# Patient Record
Sex: MALE | Race: WHITE | NOT HISPANIC OR LATINO
[De-identification: names, ages, dates, MRNs, and addresses within clinical notes are randomized per-mention and may not be internally consistent; named-entity substitution may affect disease eponyms.]

---

## 2017-04-03 ENCOUNTER — APPOINTMENT (OUTPATIENT)
Dept: UROLOGY | Facility: CLINIC | Age: 77
End: 2017-04-03

## 2017-04-03 ENCOUNTER — OUTPATIENT (OUTPATIENT)
Dept: OUTPATIENT SERVICES | Facility: HOSPITAL | Age: 77
LOS: 1 days | End: 2017-04-03
Payer: MEDICARE

## 2017-04-03 DIAGNOSIS — R35.0 FREQUENCY OF MICTURITION: ICD-10-CM

## 2017-04-03 PROCEDURE — 76775 US EXAM ABDO BACK WALL LIM: CPT

## 2017-04-03 RX ORDER — POTASSIUM CITRATE 10 MEQ/1
10 MEQ TABLET, EXTENDED RELEASE ORAL TWICE DAILY
Qty: 180 | Refills: 3 | Status: ACTIVE | COMMUNITY
Start: 2017-04-03 | End: 1900-01-01

## 2017-04-10 DIAGNOSIS — N20.0 CALCULUS OF KIDNEY: ICD-10-CM

## 2017-05-18 ENCOUNTER — APPOINTMENT (OUTPATIENT)
Dept: RADIATION ONCOLOGY | Facility: CLINIC | Age: 77
End: 2017-05-18

## 2017-06-15 ENCOUNTER — APPOINTMENT (OUTPATIENT)
Dept: RADIATION ONCOLOGY | Facility: CLINIC | Age: 77
End: 2017-06-15

## 2017-06-22 ENCOUNTER — APPOINTMENT (OUTPATIENT)
Dept: RADIATION ONCOLOGY | Facility: CLINIC | Age: 77
End: 2017-06-22

## 2017-06-22 VITALS
DIASTOLIC BLOOD PRESSURE: 84 MMHG | WEIGHT: 233 LBS | OXYGEN SATURATION: 95 % | BODY MASS INDEX: 33.43 KG/M2 | HEART RATE: 70 BPM | RESPIRATION RATE: 18 BRPM | SYSTOLIC BLOOD PRESSURE: 131 MMHG

## 2017-09-05 ENCOUNTER — APPOINTMENT (OUTPATIENT)
Dept: ORTHOPEDIC SURGERY | Facility: CLINIC | Age: 77
End: 2017-09-05

## 2018-03-18 ENCOUNTER — EMERGENCY (EMERGENCY)
Facility: HOSPITAL | Age: 78
LOS: 1 days | Discharge: HOME | End: 2018-03-18
Attending: EMERGENCY MEDICINE | Admitting: EMERGENCY MEDICINE
Payer: MEDICARE

## 2018-03-18 VITALS
DIASTOLIC BLOOD PRESSURE: 83 MMHG | TEMPERATURE: 98 F | OXYGEN SATURATION: 96 % | RESPIRATION RATE: 16 BRPM | HEART RATE: 84 BPM | SYSTOLIC BLOOD PRESSURE: 127 MMHG

## 2018-03-18 VITALS
TEMPERATURE: 99 F | DIASTOLIC BLOOD PRESSURE: 87 MMHG | RESPIRATION RATE: 18 BRPM | SYSTOLIC BLOOD PRESSURE: 133 MMHG | HEART RATE: 87 BPM | OXYGEN SATURATION: 96 %

## 2018-03-18 LAB
ALBUMIN SERPL ELPH-MCNC: 4.1 G/DL — SIGNIFICANT CHANGE UP (ref 3.3–5)
ALP SERPL-CCNC: 106 U/L — SIGNIFICANT CHANGE UP (ref 40–120)
ALT FLD-CCNC: <5 U/L RC — LOW (ref 10–45)
ANION GAP SERPL CALC-SCNC: 11 MMOL/L — SIGNIFICANT CHANGE UP (ref 5–17)
APPEARANCE UR: CLEAR — SIGNIFICANT CHANGE UP
APTT BLD: 29.7 SEC — SIGNIFICANT CHANGE UP (ref 27.5–37.4)
AST SERPL-CCNC: 29 U/L — SIGNIFICANT CHANGE UP (ref 10–40)
BACTERIA # UR AUTO: ABNORMAL /HPF
BASOPHILS # BLD AUTO: 0.1 K/UL — SIGNIFICANT CHANGE UP (ref 0–0.2)
BASOPHILS NFR BLD AUTO: 0.6 % — SIGNIFICANT CHANGE UP (ref 0–2)
BILIRUB SERPL-MCNC: 0.5 MG/DL — SIGNIFICANT CHANGE UP (ref 0.2–1.2)
BILIRUB UR-MCNC: NEGATIVE — SIGNIFICANT CHANGE UP
BUN SERPL-MCNC: 20 MG/DL — SIGNIFICANT CHANGE UP (ref 7–23)
CALCIUM SERPL-MCNC: 9.1 MG/DL — SIGNIFICANT CHANGE UP (ref 8.4–10.5)
CHLORIDE SERPL-SCNC: 105 MMOL/L — SIGNIFICANT CHANGE UP (ref 96–108)
CO2 SERPL-SCNC: 24 MMOL/L — SIGNIFICANT CHANGE UP (ref 22–31)
COLOR SPEC: YELLOW — SIGNIFICANT CHANGE UP
COMMENT - URINE: SIGNIFICANT CHANGE UP
CREAT SERPL-MCNC: 1.51 MG/DL — HIGH (ref 0.5–1.3)
DIFF PNL FLD: NEGATIVE — SIGNIFICANT CHANGE UP
EOSINOPHIL # BLD AUTO: 0.9 K/UL — HIGH (ref 0–0.5)
EOSINOPHIL NFR BLD AUTO: 7.8 % — HIGH (ref 0–6)
GAS PNL BLDV: SIGNIFICANT CHANGE UP
GLUCOSE SERPL-MCNC: 116 MG/DL — HIGH (ref 70–99)
GLUCOSE UR QL: NEGATIVE — SIGNIFICANT CHANGE UP
HCT VFR BLD CALC: 40.4 % — SIGNIFICANT CHANGE UP (ref 39–50)
HGB BLD-MCNC: 13.3 G/DL — SIGNIFICANT CHANGE UP (ref 13–17)
HYALINE CASTS # UR AUTO: ABNORMAL
INR BLD: 1.08 RATIO — SIGNIFICANT CHANGE UP (ref 0.88–1.16)
KETONES UR-MCNC: NEGATIVE — SIGNIFICANT CHANGE UP
LEUKOCYTE ESTERASE UR-ACNC: NEGATIVE — SIGNIFICANT CHANGE UP
LIDOCAIN IGE QN: 99 U/L — HIGH (ref 7–60)
LYMPHOCYTES # BLD AUTO: 2.8 K/UL — SIGNIFICANT CHANGE UP (ref 1–3.3)
LYMPHOCYTES # BLD AUTO: 24.1 % — SIGNIFICANT CHANGE UP (ref 13–44)
MAGNESIUM SERPL-MCNC: 2.1 MG/DL — SIGNIFICANT CHANGE UP (ref 1.6–2.6)
MCHC RBC-ENTMCNC: 30.9 PG — SIGNIFICANT CHANGE UP (ref 27–34)
MCHC RBC-ENTMCNC: 33 GM/DL — SIGNIFICANT CHANGE UP (ref 32–36)
MCV RBC AUTO: 93.7 FL — SIGNIFICANT CHANGE UP (ref 80–100)
MONOCYTES # BLD AUTO: 1 K/UL — HIGH (ref 0–0.9)
MONOCYTES NFR BLD AUTO: 8.9 % — SIGNIFICANT CHANGE UP (ref 2–14)
NEUTROPHILS # BLD AUTO: 6.9 K/UL — SIGNIFICANT CHANGE UP (ref 1.8–7.4)
NEUTROPHILS NFR BLD AUTO: 58.6 % — SIGNIFICANT CHANGE UP (ref 43–77)
NITRITE UR-MCNC: NEGATIVE — SIGNIFICANT CHANGE UP
PH UR: 6 — SIGNIFICANT CHANGE UP (ref 5–8)
PHOSPHATE SERPL-MCNC: 2.9 MG/DL — SIGNIFICANT CHANGE UP (ref 2.5–4.5)
PLATELET # BLD AUTO: 268 K/UL — SIGNIFICANT CHANGE UP (ref 150–400)
POTASSIUM SERPL-MCNC: 4.6 MMOL/L — SIGNIFICANT CHANGE UP (ref 3.5–5.3)
POTASSIUM SERPL-SCNC: 4.6 MMOL/L — SIGNIFICANT CHANGE UP (ref 3.5–5.3)
PROT SERPL-MCNC: 7.9 G/DL — SIGNIFICANT CHANGE UP (ref 6–8.3)
PROT UR-MCNC: NEGATIVE — SIGNIFICANT CHANGE UP
PROTHROM AB SERPL-ACNC: 11.8 SEC — SIGNIFICANT CHANGE UP (ref 9.8–12.7)
RBC # BLD: 4.31 M/UL — SIGNIFICANT CHANGE UP (ref 4.2–5.8)
RBC # FLD: 13.1 % — SIGNIFICANT CHANGE UP (ref 10.3–14.5)
RBC CASTS # UR COMP ASSIST: SIGNIFICANT CHANGE UP /HPF (ref 0–2)
SODIUM SERPL-SCNC: 140 MMOL/L — SIGNIFICANT CHANGE UP (ref 135–145)
SP GR SPEC: 1.02 — SIGNIFICANT CHANGE UP (ref 1.01–1.02)
UROBILINOGEN FLD QL: NEGATIVE — SIGNIFICANT CHANGE UP
WBC # BLD: 11.7 K/UL — HIGH (ref 3.8–10.5)
WBC # FLD AUTO: 11.7 K/UL — HIGH (ref 3.8–10.5)
WBC UR QL: SIGNIFICANT CHANGE UP /HPF (ref 0–5)

## 2018-03-18 PROCEDURE — 82947 ASSAY GLUCOSE BLOOD QUANT: CPT

## 2018-03-18 PROCEDURE — 85610 PROTHROMBIN TIME: CPT

## 2018-03-18 PROCEDURE — 84100 ASSAY OF PHOSPHORUS: CPT

## 2018-03-18 PROCEDURE — 99284 EMERGENCY DEPT VISIT MOD MDM: CPT | Mod: GC

## 2018-03-18 PROCEDURE — 74177 CT ABD & PELVIS W/CONTRAST: CPT | Mod: 26

## 2018-03-18 PROCEDURE — 83605 ASSAY OF LACTIC ACID: CPT

## 2018-03-18 PROCEDURE — 82435 ASSAY OF BLOOD CHLORIDE: CPT

## 2018-03-18 PROCEDURE — 82803 BLOOD GASES ANY COMBINATION: CPT

## 2018-03-18 PROCEDURE — 81001 URINALYSIS AUTO W/SCOPE: CPT

## 2018-03-18 PROCEDURE — 85014 HEMATOCRIT: CPT

## 2018-03-18 PROCEDURE — 85027 COMPLETE CBC AUTOMATED: CPT

## 2018-03-18 PROCEDURE — 84295 ASSAY OF SERUM SODIUM: CPT

## 2018-03-18 PROCEDURE — 80053 COMPREHEN METABOLIC PANEL: CPT

## 2018-03-18 PROCEDURE — 82330 ASSAY OF CALCIUM: CPT

## 2018-03-18 PROCEDURE — 85730 THROMBOPLASTIN TIME PARTIAL: CPT

## 2018-03-18 PROCEDURE — 36415 COLL VENOUS BLD VENIPUNCTURE: CPT

## 2018-03-18 PROCEDURE — 84132 ASSAY OF SERUM POTASSIUM: CPT

## 2018-03-18 PROCEDURE — 83735 ASSAY OF MAGNESIUM: CPT

## 2018-03-18 PROCEDURE — 99284 EMERGENCY DEPT VISIT MOD MDM: CPT | Mod: 25

## 2018-03-18 PROCEDURE — 83690 ASSAY OF LIPASE: CPT

## 2018-03-18 PROCEDURE — 74177 CT ABD & PELVIS W/CONTRAST: CPT

## 2018-03-18 RX ORDER — SODIUM CHLORIDE 9 MG/ML
1000 INJECTION INTRAMUSCULAR; INTRAVENOUS; SUBCUTANEOUS ONCE
Qty: 0 | Refills: 0 | Status: COMPLETED | OUTPATIENT
Start: 2018-03-18 | End: 2018-03-18

## 2018-03-18 RX ADMIN — SODIUM CHLORIDE 1000 MILLILITER(S): 9 INJECTION INTRAMUSCULAR; INTRAVENOUS; SUBCUTANEOUS at 12:08

## 2018-03-18 NOTE — ED PROVIDER NOTE - ATTENDING CONTRIBUTION TO CARE
attending Myriam: 77yM h/o Parkinson's disease, HTN, HLD p/w abdominal pain and diarrhea. As per pt's wife, pt with watery diarrhea yesterday, resolved. No BRBPR or melena, fevers, chills, sweats, nausea or vomiting. Today with episode of LLQ pain, severe x 1 hour but now improved. Longstanding history alternating constipation and diarrhea requiring pelvic floor rehabilitation. Prior C diff, last abx 12 months ago. On exam, well-appearing, NAD, VSS, abdomen soft/NT/ND. Will obtain labs, urinalysis, CT A/P, IVF and reassess.

## 2018-03-18 NOTE — ED ADULT NURSE NOTE - PMH
Depression    Gout    HLD (hyperlipidemia)    HTN (hypertension)    Parkinson's disease    Prostate cancer

## 2018-03-18 NOTE — ED PROVIDER NOTE - NS ED ROS FT
REVIEW OF SYSTEMS:    CONSTITUTIONAL: No weakness, fevers or chills  EYES/ENT: No visual changes;  No vertigo or throat pain   NECK: No pain or stiffness  RESPIRATORY: No cough, wheezing, hemoptysis; No shortness of breath  CARDIOVASCULAR: No chest pain or palpitations  GASTROINTESTINAL: Endorses abdominal pain. No nausea, vomiting, or hematemesis; Endorses diarrhea. No melena or hematochezia.  GENITOURINARY: No dysuria, frequency or hematuria  NEUROLOGICAL: No numbness or weakness  SKIN: No itching, burning, rashes, or lesions   All other review of systems is negative unless indicated above.

## 2018-03-18 NOTE — ED PROVIDER NOTE - PHYSICAL EXAMINATION
GENERAL: No acute distress, well-developed  HEAD:  Atraumatic, Normocephalic  ENT: EOMI, PERRLA, conjunctiva and sclera clear, Neck supple, No JVD, dry mucosa, no pharyngeal erythema, no tonsillar enlargement or exudate  CHEST/LUNG: Clear to auscultation bilaterally; No wheeze, equal breath sounds bilaterally   HEART: Regular rate and rhythm; No murmurs, rubs, or gallops  ABDOMEN: Soft, Nontender, Nondistended; Bowel sounds present, no organomegaly  EXTREMITIES:  No clubbing, cyanosis, or edema  PSYCH: Nl behavior, nl affect  NEUROLOGY: AAOx3, non-focal, cranial nerves intact  SKIN: Normal color, No rashes or lesions

## 2018-03-18 NOTE — ED PROVIDER NOTE - OBJECTIVE STATEMENT
76 y/o M hx Parkinson's disease, HTN, HLD presents with abdominal pain and diarrhea.     Per patient's wife, he had constant diarrhea for nearly four hours yesterday. Stool is described as loose and "clumpy." Denies BRBPR or melena. Denies fevers, chills, sweats, nausea or vomiting. This morning, he had severe sharp left lower abdominal pain lasting approx 45 minutes without radiation. Pain is presently nearly resolved. He does have a hx of alternating constipation and diarrhea requiring pelvic floor rehabilitation. Patient follows with [facility] and PMD is Dr. Piotr Mcdonnell. He has history of Cdiff infection, though wife is unsure when. Patient denies recent hospitalization, antibiotic use, or illness.

## 2018-03-18 NOTE — ED PROVIDER NOTE - MEDICAL DECISION MAKING DETAILS
78 y/o M Parkinson's Disease p/w LLQ abdominal pain and diarrhea. VSS. Appears clinically dehydrated. Abdominal exam without tenderness to palpation. IVF, CT A/P, CBC, CMP, re-evaluation.

## 2018-03-18 NOTE — ED ADULT NURSE NOTE - OBJECTIVE STATEMENT
77 year old male presents to the ED complaining of abdominal pain that started suddenly last night. As per patient he had 'constipation pain' yesterday that was followed by an episode of diarrhea. As per patient the pain was severe this morning and has been resolving since arrival in the ED. Pt has parkinson's and a history of prostate CA. Pt is A&O x 4, VSS, afebrile, ambulating independently. Pt denies fever, chills, NVD, SOB, or chest pain. IV placed, labs drawn, bed in low position, safety measures in place. Wife at bedside, pt comfortable in appearance, pt refusing to put on ER socks, requests to wear shoes when ambulating in the ED.

## 2018-03-18 NOTE — ED ADULT NURSE NOTE - DISCHARGE TEACHING
How Severe Are They?: moderate
Is This A New Presentation, Or A Follow-Up?: Discoloration
Pt discharged, VSS, afebrile, ambulating independently. Nurse clearly reviewed discharge instructions, followup care, and when to return to the ED - Pt verbalized understanding.

## 2018-04-02 ENCOUNTER — APPOINTMENT (OUTPATIENT)
Dept: UROLOGY | Facility: CLINIC | Age: 78
End: 2018-04-02

## 2018-05-08 ENCOUNTER — RX RENEWAL (OUTPATIENT)
Age: 78
End: 2018-05-08

## 2018-06-22 ENCOUNTER — APPOINTMENT (OUTPATIENT)
Dept: UROLOGY | Facility: CLINIC | Age: 78
End: 2018-06-22
Payer: MEDICARE

## 2018-06-22 DIAGNOSIS — N40.0 BENIGN PROSTATIC HYPERPLASIA WITHOUT LOWER URINARY TRACT SYMPMS: ICD-10-CM

## 2018-06-22 PROCEDURE — 99213 OFFICE O/P EST LOW 20 MIN: CPT

## 2018-06-25 LAB — PSA SERPL-MCNC: 0.09 NG/ML

## 2018-06-28 ENCOUNTER — APPOINTMENT (OUTPATIENT)
Dept: RADIATION ONCOLOGY | Facility: CLINIC | Age: 78
End: 2018-06-28
Payer: MEDICARE

## 2018-06-28 VITALS
OXYGEN SATURATION: 98 % | RESPIRATION RATE: 16 BRPM | DIASTOLIC BLOOD PRESSURE: 85 MMHG | HEART RATE: 103 BPM | SYSTOLIC BLOOD PRESSURE: 139 MMHG | TEMPERATURE: 97.9 F

## 2018-06-28 PROCEDURE — 99213 OFFICE O/P EST LOW 20 MIN: CPT

## 2018-08-06 ENCOUNTER — RX RENEWAL (OUTPATIENT)
Age: 78
End: 2018-08-06

## 2018-08-06 RX ORDER — ALLOPURINOL 300 MG/1
300 TABLET ORAL DAILY
Qty: 90 | Refills: 2 | Status: ACTIVE | COMMUNITY
Start: 2017-04-03 | End: 1900-01-01

## 2018-12-03 ENCOUNTER — APPOINTMENT (OUTPATIENT)
Dept: UROLOGY | Facility: CLINIC | Age: 78
End: 2018-12-03

## 2019-02-13 ENCOUNTER — OUTPATIENT (OUTPATIENT)
Dept: OUTPATIENT SERVICES | Facility: HOSPITAL | Age: 79
LOS: 1 days | End: 2019-02-13
Payer: MEDICARE

## 2019-02-13 ENCOUNTER — APPOINTMENT (OUTPATIENT)
Dept: UROLOGY | Facility: CLINIC | Age: 79
End: 2019-02-13
Payer: MEDICARE

## 2019-02-13 DIAGNOSIS — C61 MALIGNANT NEOPLASM OF PROSTATE: ICD-10-CM

## 2019-02-13 DIAGNOSIS — N20.0 CALCULUS OF KIDNEY: ICD-10-CM

## 2019-02-13 PROCEDURE — 99213 OFFICE O/P EST LOW 20 MIN: CPT | Mod: 25

## 2019-02-13 PROCEDURE — 76775 US EXAM ABDO BACK WALL LIM: CPT | Mod: 26

## 2019-02-13 PROCEDURE — 76775 US EXAM ABDO BACK WALL LIM: CPT

## 2019-02-13 NOTE — ASSESSMENT
[FreeTextEntry1] : LL shows decreased output still low, decreased Cit levels, consistently high Ox levels, and he is SS CaOx.\par Advised pt to once again increase fluid intake and to decrease Ox intake. \par Pt is on allopurinol.\par He is unable to swallow K Citrate. \par Otherwise, pt is doing well.\par Repeat LL in 5 months and RTO in 6 months.

## 2019-02-13 NOTE — ADDENDUM
[FreeTextEntry1] :  I, Cely Ledezma, acted solely as a scribe for Dr. Bhupinder Calle. The documentation recorded by the scribe accurately reflects the service I personally performed and the decision by me.\par

## 2019-02-13 NOTE — HISTORY OF PRESENT ILLNESS
[FreeTextEntry1] : 78 year old male presents for evaluation of test results.\par He has Parkinson's disease.\par This patient had radiation therapy for his prostate cancer.\par He's attended physiotherapy for pelvic floor, which his wife had said has resolved his incontinence and improved his bowel movements. \par Nocturia x0-1\par Last PSA was 0.09 in June 2018\par US today reviewed: Aside from simple renal cysts, pt has no stones, hydronephrosis or solid masses bilaterally. \par LL shows decreased output still low, decreased Cit levels, consistently high Ox levels, and he is SS CaOx.\par Advised pt to once again increase fluid intake and to decrease Ox intake. \par Pt is on allopurinol.\par He is unable to swallow K Citrate. \par Otherwise, pt is doing well.\par Repeat LL in 5 months and RTO in 6 months.

## 2019-02-15 DIAGNOSIS — C61 MALIGNANT NEOPLASM OF PROSTATE: ICD-10-CM

## 2019-02-15 DIAGNOSIS — N20.0 CALCULUS OF KIDNEY: ICD-10-CM

## 2019-02-25 ENCOUNTER — APPOINTMENT (OUTPATIENT)
Dept: NEUROLOGY | Facility: CLINIC | Age: 79
End: 2019-02-25

## 2019-03-11 ENCOUNTER — OUTPATIENT (OUTPATIENT)
Dept: OUTPATIENT SERVICES | Facility: HOSPITAL | Age: 79
LOS: 1 days | End: 2019-03-11
Payer: MEDICARE

## 2019-03-11 ENCOUNTER — OTHER (OUTPATIENT)
Age: 79
End: 2019-03-11

## 2019-03-11 ENCOUNTER — APPOINTMENT (OUTPATIENT)
Dept: UROLOGY | Facility: CLINIC | Age: 79
End: 2019-03-11
Payer: MEDICARE

## 2019-03-11 VITALS — SYSTOLIC BLOOD PRESSURE: 130 MMHG | DIASTOLIC BLOOD PRESSURE: 70 MMHG

## 2019-03-11 DIAGNOSIS — R31.0 GROSS HEMATURIA: ICD-10-CM

## 2019-03-11 DIAGNOSIS — R35.0 FREQUENCY OF MICTURITION: ICD-10-CM

## 2019-03-11 PROCEDURE — 51701 INSERT BLADDER CATHETER: CPT

## 2019-03-11 PROCEDURE — 99213 OFFICE O/P EST LOW 20 MIN: CPT | Mod: 25

## 2019-03-11 NOTE — HISTORY OF PRESENT ILLNESS
[FreeTextEntry1] : 78 year old male presents with acute urinary retention.\par Recently hospitalized for pneumonia and was sent for rehabilitation.\par In the Rehabilitation center, he was advised to drink a great deal of water and then developed urinary retention.\par An attempt was made to catheterize him by a nurse and this resulted in significant urethral bleeding and a doctor was called to try again.\par He eventually managed to introduce the catheter and since then, the pt has had gross hematuria.\par The catheter was removed 2 days ago and he went back into retention. \par I've arranged to insert a Arrieta catheter and plan to keep it in place until his urine is clear. \par At that stage, we should plan to remove the catheter at home and then bring hm in to check his residual urine.

## 2019-03-12 RX ORDER — AMOXICILLIN AND CLAVULANATE POTASSIUM 875; 125 MG/1; MG/1
875-125 TABLET, COATED ORAL
Qty: 14 | Refills: 0 | Status: ACTIVE | COMMUNITY
Start: 2019-03-12 | End: 1900-01-01

## 2019-03-12 RX ORDER — CIPROFLOXACIN HYDROCHLORIDE 500 MG/1
500 TABLET, FILM COATED ORAL TWICE DAILY
Qty: 10 | Refills: 0 | Status: ACTIVE | COMMUNITY
Start: 2019-03-12 | End: 1900-01-01

## 2019-03-13 ENCOUNTER — MESSAGE (OUTPATIENT)
Age: 79
End: 2019-03-13

## 2019-03-15 ENCOUNTER — INPATIENT (INPATIENT)
Facility: HOSPITAL | Age: 79
LOS: 10 days | Discharge: HOME | DRG: 698 | End: 2019-03-26
Attending: HOSPITALIST | Admitting: STUDENT IN AN ORGANIZED HEALTH CARE EDUCATION/TRAINING PROGRAM
Payer: MEDICARE

## 2019-03-15 VITALS
HEIGHT: 70 IN | WEIGHT: 214.95 LBS | HEART RATE: 113 BPM | OXYGEN SATURATION: 95 % | TEMPERATURE: 98 F | RESPIRATION RATE: 20 BRPM | DIASTOLIC BLOOD PRESSURE: 74 MMHG | SYSTOLIC BLOOD PRESSURE: 125 MMHG

## 2019-03-15 PROCEDURE — 99285 EMERGENCY DEPT VISIT HI MDM: CPT | Mod: 25,GC

## 2019-03-15 PROCEDURE — 51700 IRRIGATION OF BLADDER: CPT | Mod: GC

## 2019-03-16 DIAGNOSIS — N17.9 ACUTE KIDNEY FAILURE, UNSPECIFIED: ICD-10-CM

## 2019-03-16 DIAGNOSIS — Z29.9 ENCOUNTER FOR PROPHYLACTIC MEASURES, UNSPECIFIED: ICD-10-CM

## 2019-03-16 DIAGNOSIS — K59.00 CONSTIPATION, UNSPECIFIED: ICD-10-CM

## 2019-03-16 DIAGNOSIS — E78.5 HYPERLIPIDEMIA, UNSPECIFIED: ICD-10-CM

## 2019-03-16 DIAGNOSIS — N39.0 URINARY TRACT INFECTION, SITE NOT SPECIFIED: ICD-10-CM

## 2019-03-16 DIAGNOSIS — G20 PARKINSON'S DISEASE: ICD-10-CM

## 2019-03-16 DIAGNOSIS — R33.9 RETENTION OF URINE, UNSPECIFIED: ICD-10-CM

## 2019-03-16 LAB
ALBUMIN SERPL ELPH-MCNC: 3.9 G/DL — SIGNIFICANT CHANGE UP (ref 3.3–5)
ALP SERPL-CCNC: 100 U/L — SIGNIFICANT CHANGE UP (ref 40–120)
ALT FLD-CCNC: 6 U/L — LOW (ref 10–45)
ANION GAP SERPL CALC-SCNC: 14 MMOL/L — SIGNIFICANT CHANGE UP (ref 5–17)
ANION GAP SERPL CALC-SCNC: 16 MMOL/L — SIGNIFICANT CHANGE UP (ref 5–17)
APPEARANCE UR: ABNORMAL
AST SERPL-CCNC: 29 U/L — SIGNIFICANT CHANGE UP (ref 10–40)
BASOPHILS # BLD AUTO: 0.1 K/UL — SIGNIFICANT CHANGE UP (ref 0–0.2)
BASOPHILS NFR BLD AUTO: 0.5 % — SIGNIFICANT CHANGE UP (ref 0–2)
BILIRUB SERPL-MCNC: 0.2 MG/DL — SIGNIFICANT CHANGE UP (ref 0.2–1.2)
BILIRUB UR-MCNC: SIGNIFICANT CHANGE UP
BUN SERPL-MCNC: 38 MG/DL — HIGH (ref 7–23)
BUN SERPL-MCNC: 40 MG/DL — HIGH (ref 7–23)
CALCIUM SERPL-MCNC: 9.1 MG/DL — SIGNIFICANT CHANGE UP (ref 8.4–10.5)
CALCIUM SERPL-MCNC: 9.1 MG/DL — SIGNIFICANT CHANGE UP (ref 8.4–10.5)
CHLORIDE SERPL-SCNC: 103 MMOL/L — SIGNIFICANT CHANGE UP (ref 96–108)
CHLORIDE SERPL-SCNC: 104 MMOL/L — SIGNIFICANT CHANGE UP (ref 96–108)
CO2 SERPL-SCNC: 19 MMOL/L — LOW (ref 22–31)
CO2 SERPL-SCNC: 20 MMOL/L — LOW (ref 22–31)
COLOR SPEC: ABNORMAL
CREAT SERPL-MCNC: 2.88 MG/DL — HIGH (ref 0.5–1.3)
CREAT SERPL-MCNC: 3.04 MG/DL — HIGH (ref 0.5–1.3)
DIFF PNL FLD: SIGNIFICANT CHANGE UP
EOSINOPHIL # BLD AUTO: 0.4 K/UL — SIGNIFICANT CHANGE UP (ref 0–0.5)
EOSINOPHIL NFR BLD AUTO: 2.4 % — SIGNIFICANT CHANGE UP (ref 0–6)
GLUCOSE SERPL-MCNC: 110 MG/DL — HIGH (ref 70–99)
GLUCOSE SERPL-MCNC: 117 MG/DL — HIGH (ref 70–99)
GLUCOSE UR QL: SIGNIFICANT CHANGE UP
HCT VFR BLD CALC: 32.6 % — LOW (ref 39–50)
HGB BLD-MCNC: 10.9 G/DL — LOW (ref 13–17)
KETONES UR-MCNC: SIGNIFICANT CHANGE UP
LEUKOCYTE ESTERASE UR-ACNC: SIGNIFICANT CHANGE UP
LYMPHOCYTES # BLD AUTO: 1.9 K/UL — SIGNIFICANT CHANGE UP (ref 1–3.3)
LYMPHOCYTES # BLD AUTO: 12.3 % — LOW (ref 13–44)
MCHC RBC-ENTMCNC: 30.7 PG — SIGNIFICANT CHANGE UP (ref 27–34)
MCHC RBC-ENTMCNC: 33.3 GM/DL — SIGNIFICANT CHANGE UP (ref 32–36)
MCV RBC AUTO: 92.1 FL — SIGNIFICANT CHANGE UP (ref 80–100)
MONOCYTES # BLD AUTO: 0.8 K/UL — SIGNIFICANT CHANGE UP (ref 0–0.9)
MONOCYTES NFR BLD AUTO: 5.3 % — SIGNIFICANT CHANGE UP (ref 2–14)
NEUTROPHILS # BLD AUTO: 12.2 K/UL — HIGH (ref 1.8–7.4)
NEUTROPHILS NFR BLD AUTO: 79.5 % — HIGH (ref 43–77)
NITRITE UR-MCNC: SIGNIFICANT CHANGE UP
PH UR: SIGNIFICANT CHANGE UP (ref 5–8)
PLATELET # BLD AUTO: 387 K/UL — SIGNIFICANT CHANGE UP (ref 150–400)
POTASSIUM SERPL-MCNC: 4.8 MMOL/L — SIGNIFICANT CHANGE UP (ref 3.5–5.3)
POTASSIUM SERPL-MCNC: 4.9 MMOL/L — SIGNIFICANT CHANGE UP (ref 3.5–5.3)
POTASSIUM SERPL-SCNC: 4.8 MMOL/L — SIGNIFICANT CHANGE UP (ref 3.5–5.3)
POTASSIUM SERPL-SCNC: 4.9 MMOL/L — SIGNIFICANT CHANGE UP (ref 3.5–5.3)
PROT SERPL-MCNC: 7.3 G/DL — SIGNIFICANT CHANGE UP (ref 6–8.3)
PROT UR-MCNC: SIGNIFICANT CHANGE UP
RBC # BLD: 3.54 M/UL — LOW (ref 4.2–5.8)
RBC # FLD: 13.2 % — SIGNIFICANT CHANGE UP (ref 10.3–14.5)
RBC CASTS # UR COMP ASSIST: >50 /HPF — HIGH (ref 0–4)
SODIUM SERPL-SCNC: 138 MMOL/L — SIGNIFICANT CHANGE UP (ref 135–145)
SODIUM SERPL-SCNC: 138 MMOL/L — SIGNIFICANT CHANGE UP (ref 135–145)
SP GR SPEC: 1.03 — HIGH (ref 1.01–1.02)
UROBILINOGEN FLD QL: SIGNIFICANT CHANGE UP
WBC # BLD: 15.4 K/UL — HIGH (ref 3.8–10.5)
WBC # FLD AUTO: 15.4 K/UL — HIGH (ref 3.8–10.5)
WBC UR QL: 15 /HPF — HIGH (ref 0–5)

## 2019-03-16 PROCEDURE — 99223 1ST HOSP IP/OBS HIGH 75: CPT

## 2019-03-16 RX ORDER — MORPHINE SULFATE 50 MG/1
2 CAPSULE, EXTENDED RELEASE ORAL ONCE
Qty: 0 | Refills: 0 | Status: DISCONTINUED | OUTPATIENT
Start: 2019-03-16 | End: 2019-03-16

## 2019-03-16 RX ORDER — CEFTRIAXONE 500 MG/1
INJECTION, POWDER, FOR SOLUTION INTRAMUSCULAR; INTRAVENOUS
Qty: 0 | Refills: 0 | Status: DISCONTINUED | OUTPATIENT
Start: 2019-03-16 | End: 2019-03-24

## 2019-03-16 RX ORDER — AMANTADINE HCL 100 MG
100 CAPSULE ORAL
Qty: 0 | Refills: 0 | Status: DISCONTINUED | OUTPATIENT
Start: 2019-03-16 | End: 2019-03-26

## 2019-03-16 RX ORDER — SODIUM CHLORIDE 9 MG/ML
1000 INJECTION, SOLUTION INTRAVENOUS
Qty: 0 | Refills: 0 | Status: DISCONTINUED | OUTPATIENT
Start: 2019-03-16 | End: 2019-03-17

## 2019-03-16 RX ORDER — CARBIDOPA AND LEVODOPA 25; 100 MG/1; MG/1
2 TABLET ORAL EVERY 6 HOURS
Qty: 0 | Refills: 0 | Status: DISCONTINUED | OUTPATIENT
Start: 2019-03-16 | End: 2019-03-26

## 2019-03-16 RX ORDER — SENNA PLUS 8.6 MG/1
2 TABLET ORAL AT BEDTIME
Qty: 0 | Refills: 0 | Status: DISCONTINUED | OUTPATIENT
Start: 2019-03-16 | End: 2019-03-26

## 2019-03-16 RX ORDER — TAMSULOSIN HYDROCHLORIDE 0.4 MG/1
0.4 CAPSULE ORAL AT BEDTIME
Qty: 0 | Refills: 0 | Status: DISCONTINUED | OUTPATIENT
Start: 2019-03-16 | End: 2019-03-26

## 2019-03-16 RX ORDER — MORPHINE SULFATE 50 MG/1
4 CAPSULE, EXTENDED RELEASE ORAL ONCE
Qty: 0 | Refills: 0 | Status: DISCONTINUED | OUTPATIENT
Start: 2019-03-16 | End: 2019-03-16

## 2019-03-16 RX ORDER — RASAGILINE 0.5 MG/1
1 TABLET ORAL DAILY
Qty: 0 | Refills: 0 | Status: DISCONTINUED | OUTPATIENT
Start: 2019-03-16 | End: 2019-03-26

## 2019-03-16 RX ORDER — ALPRAZOLAM 0.25 MG
0.12 TABLET ORAL ONCE
Qty: 0 | Refills: 0 | Status: DISCONTINUED | OUTPATIENT
Start: 2019-03-16 | End: 2019-03-16

## 2019-03-16 RX ORDER — CEFTRIAXONE 500 MG/1
1 INJECTION, POWDER, FOR SOLUTION INTRAMUSCULAR; INTRAVENOUS ONCE
Qty: 0 | Refills: 0 | Status: COMPLETED | OUTPATIENT
Start: 2019-03-16 | End: 2019-03-16

## 2019-03-16 RX ORDER — FAMOTIDINE 10 MG/ML
20 INJECTION INTRAVENOUS DAILY
Qty: 0 | Refills: 0 | Status: DISCONTINUED | OUTPATIENT
Start: 2019-03-16 | End: 2019-03-26

## 2019-03-16 RX ORDER — LISINOPRIL 2.5 MG/1
40 TABLET ORAL DAILY
Qty: 0 | Refills: 0 | Status: DISCONTINUED | OUTPATIENT
Start: 2019-03-16 | End: 2019-03-17

## 2019-03-16 RX ORDER — CEFTRIAXONE 500 MG/1
1 INJECTION, POWDER, FOR SOLUTION INTRAMUSCULAR; INTRAVENOUS EVERY 24 HOURS
Qty: 0 | Refills: 0 | Status: DISCONTINUED | OUTPATIENT
Start: 2019-03-17 | End: 2019-03-24

## 2019-03-16 RX ORDER — LUBIPROSTONE 24 UG/1
8 CAPSULE, GELATIN COATED ORAL
Qty: 0 | Refills: 0 | Status: DISCONTINUED | OUTPATIENT
Start: 2019-03-16 | End: 2019-03-26

## 2019-03-16 RX ORDER — ALPRAZOLAM 0.25 MG
0.5 TABLET ORAL
Qty: 0 | Refills: 0 | Status: DISCONTINUED | OUTPATIENT
Start: 2019-03-16 | End: 2019-03-21

## 2019-03-16 RX ORDER — LIDOCAINE 4 G/100G
1 CREAM TOPICAL
Qty: 0 | Refills: 0 | Status: DISCONTINUED | OUTPATIENT
Start: 2019-03-16 | End: 2019-03-26

## 2019-03-16 RX ORDER — SIMVASTATIN 20 MG/1
10 TABLET, FILM COATED ORAL AT BEDTIME
Qty: 0 | Refills: 0 | Status: DISCONTINUED | OUTPATIENT
Start: 2019-03-16 | End: 2019-03-26

## 2019-03-16 RX ORDER — ALLOPURINOL 300 MG
100 TABLET ORAL DAILY
Qty: 0 | Refills: 0 | Status: DISCONTINUED | OUTPATIENT
Start: 2019-03-16 | End: 2019-03-26

## 2019-03-16 RX ORDER — ALPRAZOLAM 0.25 MG
12.5 TABLET ORAL ONCE
Qty: 0 | Refills: 0 | Status: DISCONTINUED | OUTPATIENT
Start: 2019-03-16 | End: 2019-03-16

## 2019-03-16 RX ORDER — SERTRALINE 25 MG/1
100 TABLET, FILM COATED ORAL DAILY
Qty: 0 | Refills: 0 | Status: DISCONTINUED | OUTPATIENT
Start: 2019-03-16 | End: 2019-03-26

## 2019-03-16 RX ADMIN — CARBIDOPA AND LEVODOPA 2 CAPSULE(S): 25; 100 TABLET ORAL at 22:07

## 2019-03-16 RX ADMIN — FAMOTIDINE 20 MILLIGRAM(S): 10 INJECTION INTRAVENOUS at 19:02

## 2019-03-16 RX ADMIN — TAMSULOSIN HYDROCHLORIDE 0.4 MILLIGRAM(S): 0.4 CAPSULE ORAL at 22:08

## 2019-03-16 RX ADMIN — SODIUM CHLORIDE 75 MILLILITER(S): 9 INJECTION, SOLUTION INTRAVENOUS at 10:23

## 2019-03-16 RX ADMIN — MORPHINE SULFATE 2 MILLIGRAM(S): 50 CAPSULE, EXTENDED RELEASE ORAL at 04:11

## 2019-03-16 RX ADMIN — SODIUM CHLORIDE 75 MILLILITER(S): 9 INJECTION, SOLUTION INTRAVENOUS at 09:46

## 2019-03-16 RX ADMIN — CEFTRIAXONE 100 GRAM(S): 500 INJECTION, POWDER, FOR SOLUTION INTRAMUSCULAR; INTRAVENOUS at 19:02

## 2019-03-16 RX ADMIN — LUBIPROSTONE 8 MICROGRAM(S): 24 CAPSULE, GELATIN COATED ORAL at 22:07

## 2019-03-16 RX ADMIN — Medication 100 MILLIGRAM(S): at 19:02

## 2019-03-16 RX ADMIN — Medication 0.5 MILLIGRAM(S): at 22:18

## 2019-03-16 RX ADMIN — Medication 100 MILLIGRAM(S): at 22:06

## 2019-03-16 RX ADMIN — MORPHINE SULFATE 4 MILLIGRAM(S): 50 CAPSULE, EXTENDED RELEASE ORAL at 02:39

## 2019-03-16 RX ADMIN — LISINOPRIL 40 MILLIGRAM(S): 2.5 TABLET ORAL at 19:02

## 2019-03-16 RX ADMIN — LIDOCAINE 1 APPLICATION(S): 4 CREAM TOPICAL at 22:07

## 2019-03-16 RX ADMIN — SIMVASTATIN 10 MILLIGRAM(S): 20 TABLET, FILM COATED ORAL at 22:09

## 2019-03-16 RX ADMIN — Medication 0.12 MILLIGRAM(S): at 15:45

## 2019-03-16 NOTE — H&P ADULT - ASSESSMENT
78 year old male with history of parkinson's disease, prostate CA (s/p TURP x2 and radiation), HTN, HLD presenting to the ED with decreased urine output and suprapubic pain, admitted with OSMIN and concerns for UTI:

## 2019-03-16 NOTE — ED PROVIDER NOTE - PROGRESS NOTE DETAILS
[place] PGY1- significant urine seen in bladder on POCUS, núñez and possible clots also seen, likely > 500 cc  attempts made to flush pt's 16Fr but unsuccessful, núñez pulled   22Fr 3 way catheter inserted, 500 cc bloody urine drained  manual irigation to remove clots, produced signicant old clot  CBI set up, will run for ~2 hours [facility] PGY1- admit to [facility] for post obstructive OSMIN

## 2019-03-16 NOTE — ED PROCEDURE NOTE - ATTENDING CONTRIBUTION TO CARE
ATTENDING MD:  I, Chavez Tavarez, was available in the Emergency Department throughout the entire procedure and I was present during the key portions. I agree with the procedure as documented.

## 2019-03-16 NOTE — CONSULT NOTE ADULT - ASSESSMENT
77 y/o male PMHx Prostate CA s/p XRT 2010 presenting with hematuria and retention found to have OSMIN.     -try to obtain recent Cr level from patients PMD, Piotr Mcdonnell MD (last documented Cr 3/2018 of 1.5)  -keep núñez in place for now, no need for CBI  -trend Cr    will discuss with attending, full recs to follow 79 y/o male PMHx Prostate CA s/p XRT 2010 presenting with hematuria and retention found to have OSMIN or unknown etiology.     -try to obtain recent Cr level from patients PMD, Piotr Mcdonnell MD (last documented Cr 3/2018 of 1.5)  -keep núñez in place for now, no need for CBI  -monitor urine color  -trend Cr  -would recommend renal US to evaluate for hydro    - Discussed with Dr. Brown

## 2019-03-16 NOTE — H&P ADULT - HISTORY OF PRESENT ILLNESS
78 year old male with history of parkinson's disease, prostate CA (s/p TURP x2 and radiation), HTN, HLD presenting to the ED with decreased urine output and suprapubic pain. The patient was recently discharged from an OSH where he was treated for aspiration PNA. He was discharged to a rehab. While at the rehab, he was noted to have elevated Cr, and he was given IVF. He subsequently developed urinary retention, and had núñez placed by his urologist SUGEY Calle on 3/11. It was difficult placement, and he had hematuria since the núñez was placed. He had good output initially, however it dropped off thereafter and also developed severe pain. He had been taking bactrim initially but this was changed to augmentin by his urologist for presumed UTI. He presented to the ED with severe pain.     In the ED, he was noted to have minimal output via the núñez. It was changed, and bladder irrigation was done, with clots being removed. CBI was started.     Currently patient denies any pain. Reports discomfort from having catheter.

## 2019-03-16 NOTE — CONSULT NOTE ADULT - SUBJECTIVE AND OBJECTIVE BOX
HPI: 78 yoM, PMHx of HTN, HLD, parkinsons, prostate CA, s/p XRT   presents to ED c/o no urine output and suprapubic pain.  pt recently seen by his urologist SUGEY Calle MD 3/11 for urinary retention and 16f núñez placed in office. pt has been having hematuria since but catheter was draining well until overnight. In ED no output for few hours, 16F changed to 22F 3way and ~ 500cc hematuric urine evacuated. bladder irrigated by ED staff and clots removed and CBI started. pt observed on CBI for a couple of hours, urine remained relatively clear after clamping. Urology consulted for elevated Cr 2.88  Pt denies any hx of kidney compromise. Denies any flank pain    PAST MEDICAL & SURGICAL HISTORY:  Gout  Prostate cancer  HLD (hyperlipidemia)  HTN (hypertension)  Depression  Parkinson's disease  S/P TURP    FAMILY HISTORY:    SOCIAL HISTORY:   Tobacco hx:    MEDICATIONS  (STANDING):    MEDICATIONS  (PRN):    Allergies    No Known Allergies    Intolerances        REVIEW OF SYSTEMS: Pertinent positives and negatives as stated in HPI, otherwise negative    Vital signs  T(C): 36.8 (03-15-19 @ 20:44), Max: 36.8 (03-15-19 @ 20:44)  HR: 95 (19 @ 02:45)  BP: 111/79 (19 @ 02:45)  SpO2: 95% (19 @ 02:45)      Physical Exam  Gen: NAD  Pulm: No intercostal retractions  Back:  Abd: Soft, NT, ND  : circumcised male, testes descended bilaterally, non tender, 22F 3way núñez in place, CBI off, urine clear. bladder irrigated with sterile water- few old clots evacuated.     LABS:     @ 03:00    WBC 15.4  / Hct 32.6  / SCr 2.88         138  |  103  |  38<H>  ----------------------------<  110<H>  4.9   |  19<L>  |  2.88<H>    Ca    9.1      16 Mar 2019 03:00    TPro  7.3  /  Alb  3.9  /  TBili  0.2  /  DBili  x   /  AST  29  /  ALT  6<L>  /  AlkPhos  100  16      Urinalysis Basic - ( 16 Mar 2019 04:02 )    Color: Red / Appearance: Turbid / S.029 / pH: x  Gluc: x / Ketone: See Note  / Bili: See Note / Urobili: See Note   Blood: x / Protein: See Note / Nitrite: See Note   Leuk Esterase: See Note / RBC: >50 /hpf / WBC 15 /HPF   Sq Epi: x / Non Sq Epi: x / Bacteria: x

## 2019-03-16 NOTE — ED ADULT NURSE NOTE - NSIMPLEMENTINTERV_GEN_ALL_ED
Implemented All Universal Safety Interventions:  [place] to call system. Call bell, personal items and telephone within reach. Instruct patient to call for assistance. Room bathroom lighting operational. Non-slip footwear when patient is off stretcher. Physically safe environment: no spills, clutter or unnecessary equipment. Stretcher in lowest position, wheels locked, appropriate side rails in place.

## 2019-03-16 NOTE — H&P ADULT - ATTENDING COMMENTS
Discussed with urology regarding ASA - ok to continue for now.  Monitor H&H and hematuria, if worsening bleeding or worsening anemia - reevaluate.

## 2019-03-16 NOTE — ED PROVIDER NOTE - ATTENDING CONTRIBUTION TO CARE
ATTENDING MD:  I, Chavez Tavarez, personally have seen and examined this patient.  I have discussed all aspects of care with the resident physician. Resident note reviewed and agree on plan of care and except where noted.  See HPI, PE, and MDM for details.    well appearing, moderate dsitress from bladder pain resolved after núñez placement. nCAT, eyes clear, mucus membranes are moist, oropharynx is within normal limits, heart normal rate, regular rhythm, abdomen soft nontender. no CVAT, normal external genetalia núñez in place but not draining, small catheter 14 or 16 Thai by appearance    MDM: manual irrigation with persistent clot needs larger núñez will exchange, irrigate with CBI, check kidney function, reassess.

## 2019-03-16 NOTE — H&P ADULT - NSICDXPROBLEM_GEN_ALL_CORE_FT
PROBLEM DIAGNOSES  Problem: OSMIN (acute kidney injury)  Assessment and Plan: -Cr eelvated to 3.04 with last documented Cr of 1.5 here, acute elevation. Per patient's wife, Cr was up to 2s in rehab when patient had urinary retention there.   -Continue núñez, continue to trend Cr  -will obtain renal u/s  -avoid nephrotoxic drugs   -pt was also using aleve as an outpt, would d/c   -obtain urine lytes and urine cr   -monitor urine output     Problem: Acute UTI  Assessment and Plan: -pt was on augmentin as an outpt   -f/u urine culture here  -will place on ceftriaxone in the interim, given symptoms and leukocytosis     Problem: Urinary retention  Assessment and Plan: -history of prostate CA with radiation and TURP   -continue with núñez  -no CBI for now per urology  -f/u further urology recs   -continue flomax     Problem: Parkinsons disease  Assessment and Plan: -continue with carbidopa/levodopa and azilect    -continue with xanax for anxiety     Problem: Hyperlipidemia  Assessment and Plan: -continue with simvastatin     Problem: Constipation  Assessment and Plan: -cotninue with senna and lubiprostone     Problem: Need for prophylactic measure  Assessment and Plan: -holding chemical DVT ppx due to ongoing bleeding

## 2019-03-16 NOTE — H&P ADULT - NSICDXPASTMEDICALHX_GEN_ALL_CORE_FT
PAST MEDICAL HISTORY:  Depression     Gout     HLD (hyperlipidemia)     HTN (hypertension)     Parkinson's disease     Prostate cancer

## 2019-03-16 NOTE — ED PROVIDER NOTE - OBJECTIVE STATEMENT
78 yoM, PMHx of HTN, HLD, parkinsons, prostate CA, s/p TURP, s/p bladder neck surgery presents to ED with núñez cath in place. Placed 5 days ago by Dr. Bhupinder Calle. Output has been bloody since placement. On amoxicillin for 3 days. Today catheter's drainage seemed to slow. Wife reports she only drained 400 cc bloody urine in last 12 hours or so. Has not drained at all in last 4 hours. Pt now is significant pain with suprapubic pain. Denies chest or back pain. No fever. Known to be a difficult catheterization.

## 2019-03-16 NOTE — ED ADULT NURSE NOTE - OBJECTIVE STATEMENT
79 yo F w/ PMHx of TURP, núñez w/ leg bag, presents to ED c/o abdominal pain, núñez cath not draining. Pt states núñez has not been draining since earlier this afternoon, has had dark urine x2 weeks, has been on 2 abx recently changed w/ no improvement. Núñez was replaced on Monday. Attempt to irrigate núñez cath unsuccessful, MD Tavarez at bedside unable to clear clots. Pt bladder scan shows bladder full, bladder palpated and distended. Pt denies any CP, SOB, N/V, fever, chills, urinary complaints, constipation, diarrhea, HA, dizziness, weakness. Pt A&Ox4, lungs CTA, +central pulses. Ambulating w/ steady gait, safety and comfort maintained, no acute distress noted at this time.

## 2019-03-16 NOTE — ED PROCEDURE NOTE - PROCEDURE ADDITIONAL DETAILS
Manual irrigation of bladder removing large amount of clot using standard sterile technique. CBI started

## 2019-03-16 NOTE — ED PROVIDER NOTE - PHYSICAL EXAMINATION
PHYSICAL EXAM:  GENERAL: NAD, well-groomed, well-developed  HEAD:  Atraumatic, Normocephalic  EYES: EOMI, PERRLA, conjunctiva and sclera clear  ENMT: No tonsillar erythema, exudates, or enlargement; Moist mucous membranes  NECK: Supple, No JVD  HEART: Regular rate and rhythm; No murmurs, rubs, or gallops  RESPIRATORY: CTA B/L, No W/R/R  ABDOMEN: distended suprapubic region, tender, bladder palpable  NEURO: A&Ox3, nonfocal, moving all extremities, some abnormal parkinsonian movements  EXTREMITIES:  2+ Peripheral Pulses, No clubbing, cyanosis, or edema  SKIN: warm, dry, normal color

## 2019-03-16 NOTE — ED ADULT NURSE REASSESSMENT NOTE - NS ED NURSE REASSESS COMMENT FT1
0730 Received awake and alert. Breathing easy and non labored. Three way núñez catheter in place draining light reddish urine. CBI finished at earlier shift. Denies abdominal pain. OOB to bedside commode with assistance. Safety maintained. Will continue to reassess.

## 2019-03-16 NOTE — ED PROVIDER NOTE - CLINICAL SUMMARY MEDICAL DECISION MAKING FREE TEXT BOX
Matty PGY1- prostate ca, s/p turp, bladder neck surgery, prior retention, núñez cath in place for 4 days placed by burton wise all week, on amoxicillin  output dramatically reduced since this evening per wife who empties it, no fever, no back pain  pt appears very uncomfortable, palpable bladder, no fever, no CVAT  likely retention from malfunctioning catheter, will sono, flush cath, +/- replace  check crt, lytes, cx urine

## 2019-03-17 LAB
ANION GAP SERPL CALC-SCNC: 14 MMOL/L — SIGNIFICANT CHANGE UP (ref 5–17)
BUN SERPL-MCNC: 43 MG/DL — HIGH (ref 7–23)
CALCIUM SERPL-MCNC: 9.1 MG/DL — SIGNIFICANT CHANGE UP (ref 8.4–10.5)
CHLORIDE SERPL-SCNC: 104 MMOL/L — SIGNIFICANT CHANGE UP (ref 96–108)
CO2 SERPL-SCNC: 19 MMOL/L — LOW (ref 22–31)
CREAT SERPL-MCNC: 3.34 MG/DL — HIGH (ref 0.5–1.3)
CULTURE RESULTS: NO GROWTH — SIGNIFICANT CHANGE UP
GLUCOSE SERPL-MCNC: 107 MG/DL — HIGH (ref 70–99)
HCT VFR BLD CALC: 29.4 % — LOW (ref 39–50)
HCT VFR BLD CALC: 31.5 % — LOW (ref 39–50)
HGB BLD-MCNC: 10.2 G/DL — LOW (ref 13–17)
HGB BLD-MCNC: 9.1 G/DL — LOW (ref 13–17)
MCHC RBC-ENTMCNC: 29.4 PG — SIGNIFICANT CHANGE UP (ref 27–34)
MCHC RBC-ENTMCNC: 30.3 PG — SIGNIFICANT CHANGE UP (ref 27–34)
MCHC RBC-ENTMCNC: 31 GM/DL — LOW (ref 32–36)
MCHC RBC-ENTMCNC: 32.3 GM/DL — SIGNIFICANT CHANGE UP (ref 32–36)
MCV RBC AUTO: 94 FL — SIGNIFICANT CHANGE UP (ref 80–100)
MCV RBC AUTO: 94.8 FL — SIGNIFICANT CHANGE UP (ref 80–100)
PLATELET # BLD AUTO: 334 K/UL — SIGNIFICANT CHANGE UP (ref 150–400)
PLATELET # BLD AUTO: 368 K/UL — SIGNIFICANT CHANGE UP (ref 150–400)
POTASSIUM SERPL-MCNC: 4.9 MMOL/L — SIGNIFICANT CHANGE UP (ref 3.5–5.3)
POTASSIUM SERPL-SCNC: 4.9 MMOL/L — SIGNIFICANT CHANGE UP (ref 3.5–5.3)
RBC # BLD: 3.1 M/UL — LOW (ref 4.2–5.8)
RBC # BLD: 3.35 M/UL — LOW (ref 4.2–5.8)
RBC # FLD: 13.5 % — SIGNIFICANT CHANGE UP (ref 10.3–14.5)
RBC # FLD: 14.7 % — HIGH (ref 10.3–14.5)
SODIUM SERPL-SCNC: 137 MMOL/L — SIGNIFICANT CHANGE UP (ref 135–145)
SPECIMEN SOURCE: SIGNIFICANT CHANGE UP
WBC # BLD: 12 K/UL — HIGH (ref 3.8–10.5)
WBC # BLD: 12.27 K/UL — HIGH (ref 3.8–10.5)
WBC # FLD AUTO: 12 K/UL — HIGH (ref 3.8–10.5)
WBC # FLD AUTO: 12.27 K/UL — HIGH (ref 3.8–10.5)

## 2019-03-17 PROCEDURE — 51700 IRRIGATION OF BLADDER: CPT

## 2019-03-17 PROCEDURE — 76770 US EXAM ABDO BACK WALL COMP: CPT | Mod: 26

## 2019-03-17 PROCEDURE — 99231 SBSQ HOSP IP/OBS SF/LOW 25: CPT

## 2019-03-17 RX ORDER — ATROPA BELLADONNA AND OPIUM 16.2; 6 MG/1; MG/1
1 SUPPOSITORY RECTAL EVERY 6 HOURS
Qty: 0 | Refills: 0 | Status: DISCONTINUED | OUTPATIENT
Start: 2019-03-17 | End: 2019-03-23

## 2019-03-17 RX ORDER — SODIUM CHLORIDE 9 MG/ML
1000 INJECTION, SOLUTION INTRAVENOUS
Qty: 0 | Refills: 0 | Status: DISCONTINUED | OUTPATIENT
Start: 2019-03-17 | End: 2019-03-26

## 2019-03-17 RX ORDER — ACETAMINOPHEN 500 MG
650 TABLET ORAL ONCE
Qty: 0 | Refills: 0 | Status: COMPLETED | OUTPATIENT
Start: 2019-03-17 | End: 2019-03-17

## 2019-03-17 RX ORDER — SODIUM CHLORIDE 9 MG/ML
250 INJECTION, SOLUTION INTRAVENOUS ONCE
Qty: 0 | Refills: 0 | Status: COMPLETED | OUTPATIENT
Start: 2019-03-17 | End: 2019-03-17

## 2019-03-17 RX ADMIN — LUBIPROSTONE 8 MICROGRAM(S): 24 CAPSULE, GELATIN COATED ORAL at 18:44

## 2019-03-17 RX ADMIN — Medication 0.5 MILLIGRAM(S): at 18:49

## 2019-03-17 RX ADMIN — CARBIDOPA AND LEVODOPA 2 CAPSULE(S): 25; 100 TABLET ORAL at 06:28

## 2019-03-17 RX ADMIN — CARBIDOPA AND LEVODOPA 2 CAPSULE(S): 25; 100 TABLET ORAL at 18:44

## 2019-03-17 RX ADMIN — LIDOCAINE 1 APPLICATION(S): 4 CREAM TOPICAL at 14:42

## 2019-03-17 RX ADMIN — ATROPA BELLADONNA AND OPIUM 1 SUPPOSITORY(S): 16.2; 6 SUPPOSITORY RECTAL at 19:05

## 2019-03-17 RX ADMIN — Medication 100 MILLIGRAM(S): at 18:44

## 2019-03-17 RX ADMIN — ATROPA BELLADONNA AND OPIUM 1 SUPPOSITORY(S): 16.2; 6 SUPPOSITORY RECTAL at 18:34

## 2019-03-17 RX ADMIN — LUBIPROSTONE 8 MICROGRAM(S): 24 CAPSULE, GELATIN COATED ORAL at 06:28

## 2019-03-17 RX ADMIN — Medication 100 MILLIGRAM(S): at 14:41

## 2019-03-17 RX ADMIN — LIDOCAINE 1 APPLICATION(S): 4 CREAM TOPICAL at 06:28

## 2019-03-17 RX ADMIN — SERTRALINE 100 MILLIGRAM(S): 25 TABLET, FILM COATED ORAL at 15:04

## 2019-03-17 RX ADMIN — Medication 650 MILLIGRAM(S): at 14:42

## 2019-03-17 RX ADMIN — CARBIDOPA AND LEVODOPA 2 CAPSULE(S): 25; 100 TABLET ORAL at 15:04

## 2019-03-17 RX ADMIN — RASAGILINE 1 MILLIGRAM(S): 0.5 TABLET ORAL at 15:05

## 2019-03-17 RX ADMIN — CARBIDOPA AND LEVODOPA 2 CAPSULE(S): 25; 100 TABLET ORAL at 23:07

## 2019-03-17 RX ADMIN — CEFTRIAXONE 100 GRAM(S): 500 INJECTION, POWDER, FOR SOLUTION INTRAMUSCULAR; INTRAVENOUS at 18:44

## 2019-03-17 RX ADMIN — TAMSULOSIN HYDROCHLORIDE 0.4 MILLIGRAM(S): 0.4 CAPSULE ORAL at 23:08

## 2019-03-17 RX ADMIN — SODIUM CHLORIDE 500 MILLILITER(S): 9 INJECTION, SOLUTION INTRAVENOUS at 21:33

## 2019-03-17 RX ADMIN — Medication 100 MILLIGRAM(S): at 06:28

## 2019-03-17 RX ADMIN — FAMOTIDINE 20 MILLIGRAM(S): 10 INJECTION INTRAVENOUS at 14:40

## 2019-03-17 RX ADMIN — LIDOCAINE 1 APPLICATION(S): 4 CREAM TOPICAL at 23:08

## 2019-03-17 RX ADMIN — SIMVASTATIN 10 MILLIGRAM(S): 20 TABLET, FILM COATED ORAL at 23:08

## 2019-03-17 NOTE — PROGRESS NOTE ADULT - ASSESSMENT
77 y/o male PMHx Prostate CA s/p XRT 2010 presenting with hematuria and retention found to have OSMIN or unknown etiology.     -Keep Arrieta in place  -Continue CBI  -Monitor urine color  -Trend Cr  -UCx negative  -Awaiting renal US to evaluate for hydro

## 2019-03-17 NOTE — PROGRESS NOTE ADULT - SUBJECTIVE AND OBJECTIVE BOX
called to see patient with leaking núñez.  pt c/o bladder spasms.  no suprapubic tenderness  22F 3way in place  CBI- clamped by RN staff  bladder irrigated with sterile water  few clots evacuated  CBI restarted and titrated to clear    Rec:  belladonna/opium supp for bladder spasms  titrate CBI to keep urine clear  irrigate prn

## 2019-03-17 NOTE — PROGRESS NOTE ADULT - SUBJECTIVE AND OBJECTIVE BOX
Patient is a 78y old  Male who presents with a chief complaint of difficulty urinating, hematuria (17 Mar 2019 08:51)      SUBJECTIVE / OVERNIGHT EVENTS:  Cr still elevated.  the fiance of 20 years at bedside, know him very well.  help with history   núñez still with hematuria though less red  no cp, no sob  frustrated that he is still in the ED.       Vital Signs Last 24 Hrs  T(C): 36.4 (17 Mar 2019 07:52), Max: 36.6 (17 Mar 2019 05:29)  T(F): 97.6 (17 Mar 2019 07:52), Max: 97.9 (17 Mar 2019 05:29)  HR: 59 (17 Mar 2019 07:52) (59 - 85)  BP: 93/56 (17 Mar 2019 07:52) (92/58 - 138/78)  BP(mean): --  RR: 18 (17 Mar 2019 07:52) (18 - 18)  SpO2: 97% (17 Mar 2019 07:52) (95% - 97%)  I&O's Summary      PHYSICAL EXAM:  GENERAL: NAD, Comfortable, +parkinson features of masklike facies, mild tremor, rigitidy  HEAD:  Atraumatic, Normocephalic  EYES: EOMI, PERRLA, conjunctiva and sclera clear  NECK: Supple, No JVD  CHEST/LUNG: Clear to auscultation bilaterally; No wheeze  HEART: Regular rate and rhythm; No murmurs, rubs, or gallops  ABDOMEN: Soft, Nontender, Nondistended; Bowel sounds present  Neuro: AAO x 2, slow speech, no focal deficit  : CBI/Núñez in place, gross hematuria with clots, neg CVAT   EXTREMITIES:  2+ Peripheral Pulses, No clubbing, cyanosis, or edema, venous stasis changes  SKIN: No rashes or lesions    LABS:                        9.1    12.27 )-----------( 368      ( 17 Mar 2019 07:52 )             29.4     03-17    137  |  104  |  43<H>  ----------------------------<  107<H>  4.9   |  19<L>  |  3.34<H>    Ca    9.1      17 Mar 2019 05:43    TPro  7.3  /  Alb  3.9  /  TBili  0.2  /  DBili  x   /  AST  29  /  ALT  6<L>  /  AlkPhos  100  03-16      CAPILLARY BLOOD GLUCOSE            Urinalysis Basic - ( 16 Mar 2019 04:02 )    Color: Red / Appearance: Turbid / S.029 / pH: x  Gluc: x / Ketone: See Note  / Bili: See Note / Urobili: See Note   Blood: x / Protein: See Note / Nitrite: See Note   Leuk Esterase: See Note / RBC: >50 /hpf / WBC 15 /HPF   Sq Epi: x / Non Sq Epi: x / Bacteria: x        RADIOLOGY & ADDITIONAL TESTS:    Imaging Personally Reviewed:  [x] YES  [ ] NO    Consultant(s) Notes Reviewed:  [x] YES  [ ] NO      MEDICATIONS  (STANDING):  acetaminophen   Tablet .. 650 milliGRAM(s) Oral once  allopurinol 100 milliGRAM(s) Oral daily  amantadine 100 milliGRAM(s) Oral two times a day  carbidopa 36.25 mG/levodopa 145 mG ER 2 Capsule(s) Oral every 6 hours  cefTRIAXone   IVPB 1 Gram(s) IV Intermittent every 24 hours  cefTRIAXone   IVPB      famotidine    Tablet 20 milliGRAM(s) Oral daily  lactated ringers. 1000 milliLiter(s) (75 mL/Hr) IV Continuous <Continuous>  lidocaine 2% Gel 1 Application(s) Topical four times a day  lubiprostone 8 MICROGram(s) Oral two times a day  rasagiline Tablet 1 milliGRAM(s) Oral daily  sertraline 100 milliGRAM(s) Oral daily  simvastatin 10 milliGRAM(s) Oral at bedtime  tamsulosin 0.4 milliGRAM(s) Oral at bedtime    MEDICATIONS  (PRN):  ALPRAZolam 0.5 milliGRAM(s) Oral two times a day PRN anxiety  senna 2 Tablet(s) Oral at bedtime PRN Constipation      Care Discussed with Consultants/Other Providers [x] YES  [ ] NO    HEALTH ISSUES - PROBLEM Dx:  Need for prophylactic measure  Constipation  Hyperlipidemia  Parkinsons disease  Urinary retention  Acute UTI  OSMIN (acute kidney injury)

## 2019-03-17 NOTE — CHART NOTE - NSCHARTNOTEFT_GEN_A_CORE
Urology called to ED holding to assess urine color, per nurse color darker red than earlier in the day when CBI initially clamped.  Núñez irrigated with NS to light pink, 60cc clot return. CBI restarted and remained clear. Will continue CBI overnight and reassess in the morning. Irrigate núñez as needed.

## 2019-03-17 NOTE — PROVIDER CONTACT NOTE (OTHER) - ASSESSMENT
Pt. is AOx4. VSS. Arrieta in place with dark red urine. Pt. c/o discomfort. Lidocaine gel applies. Emotional support was given.

## 2019-03-17 NOTE — PROVIDER CONTACT NOTE (OTHER) - ACTION/TREATMENT ORDERED:
KRISTINA Hill aware and assessed pt. at the bedside. Flush Arrieta with 60cc of NS. CBI initiated and pt. verbalized feeling better. Will continue to monitor .Safety maintained

## 2019-03-17 NOTE — PROGRESS NOTE ADULT - SUBJECTIVE AND OBJECTIVE BOX
Patient seen and examined with Dr. Kelly.  CBI clotted off, irrigated out ~100cc of clot.    T(F): 97.6, Max: 98.1 (03-16-19 @ 10:21)  HR: 59  BP: 93/56  SpO2: 97%    Gen NAD   CBI now water clear after irrigation    03-17 @ 07:52  WBC 12.27 / Hct 29.4  / SCr 3.34     .Urine Catheterized  03-16  No growth

## 2019-03-17 NOTE — PROGRESS NOTE ADULT - ASSESSMENT
78 year old male with history of parkinson's disease, prostate CA (s/p TURP x2 and radiation), HTN, HLD presenting to the ED from rehab with decreased urine output and suprapubic pain, admitted with OSMIN and concerns for UTI:      Problem Selector:  PROBLEM DIAGNOSES  Problem: OSMIN (acute kidney injury)  Assessment and Plan: -Cr eelvated to 3.04 with last documented Cr of 1.5 here, acute elevation. Per patient's wife (Carey), Cr was up to 2s in rehab when patient had urinary retention there.   -Continue núñez, continue to trend Cr  -renal u/s without hydronephrosis  -avoid nephrotoxic drugs   -pt was also using aleve as an outpt, would d/c   -obtain urine lytes and urine cr   -monitor urine output     Problem: Acute UTI  Assessment and Plan: -pt was on augmentin as an outpt   -f/u urine culture here  - c/w ceftriaxone in the interim, given symptoms and leukocytosis     Problem: Urinary retention  Assessment and Plan: -history of prostate CA with radiation and TURP   -continue with núñez  -now on CBI   - urology f/u appreciated  -continue flomax     Problem: Parkinsons disease  Assessment and Plan: -continue with carbidopa/levodopa and azilect    -continue with xanax for anxiety     Problem: Hyperlipidemia  Assessment and Plan: -continue with simvastatin     Problem: Constipation  Assessment and Plan: -cotninue with senna and lubiprostone     Problem: Need for prophylactic measure  Assessment and Plan: -holding chemical DVT ppx due to ongoing bleeding.

## 2019-03-18 ENCOUNTER — APPOINTMENT (OUTPATIENT)
Dept: UROLOGY | Facility: CLINIC | Age: 79
End: 2019-03-18

## 2019-03-18 LAB
ANION GAP SERPL CALC-SCNC: 13 MMOL/L — SIGNIFICANT CHANGE UP (ref 5–17)
BUN SERPL-MCNC: 45 MG/DL — HIGH (ref 7–23)
CALCIUM SERPL-MCNC: 9.1 MG/DL — SIGNIFICANT CHANGE UP (ref 8.4–10.5)
CHLORIDE SERPL-SCNC: 106 MMOL/L — SIGNIFICANT CHANGE UP (ref 96–108)
CO2 SERPL-SCNC: 21 MMOL/L — LOW (ref 22–31)
CREAT SERPL-MCNC: 3.1 MG/DL — HIGH (ref 0.5–1.3)
GLUCOSE SERPL-MCNC: 86 MG/DL — SIGNIFICANT CHANGE UP (ref 70–99)
HCT VFR BLD CALC: 29.2 % — LOW (ref 39–50)
HGB BLD-MCNC: 9.6 G/DL — LOW (ref 13–17)
MAGNESIUM SERPL-MCNC: 2.1 MG/DL — SIGNIFICANT CHANGE UP (ref 1.6–2.6)
MCHC RBC-ENTMCNC: 30.8 PG — SIGNIFICANT CHANGE UP (ref 27–34)
MCHC RBC-ENTMCNC: 32.9 GM/DL — SIGNIFICANT CHANGE UP (ref 32–36)
MCV RBC AUTO: 93.6 FL — SIGNIFICANT CHANGE UP (ref 80–100)
PLATELET # BLD AUTO: 402 K/UL — HIGH (ref 150–400)
POTASSIUM SERPL-MCNC: 5 MMOL/L — SIGNIFICANT CHANGE UP (ref 3.5–5.3)
POTASSIUM SERPL-SCNC: 5 MMOL/L — SIGNIFICANT CHANGE UP (ref 3.5–5.3)
RBC # BLD: 3.12 M/UL — LOW (ref 4.2–5.8)
RBC # FLD: 13 % — SIGNIFICANT CHANGE UP (ref 10.3–14.5)
SODIUM SERPL-SCNC: 140 MMOL/L — SIGNIFICANT CHANGE UP (ref 135–145)
WBC # BLD: 10.1 K/UL — SIGNIFICANT CHANGE UP (ref 3.8–10.5)
WBC # FLD AUTO: 10.1 K/UL — SIGNIFICANT CHANGE UP (ref 3.8–10.5)

## 2019-03-18 PROCEDURE — 99232 SBSQ HOSP IP/OBS MODERATE 35: CPT

## 2019-03-18 RX ORDER — ACETAMINOPHEN 500 MG
1000 TABLET ORAL ONCE
Qty: 0 | Refills: 0 | Status: COMPLETED | OUTPATIENT
Start: 2019-03-18 | End: 2019-03-19

## 2019-03-18 RX ORDER — ACETAMINOPHEN 500 MG
650 TABLET ORAL ONCE
Qty: 0 | Refills: 0 | Status: DISCONTINUED | OUTPATIENT
Start: 2019-03-18 | End: 2019-03-18

## 2019-03-18 RX ORDER — ACETAMINOPHEN 500 MG
325 TABLET ORAL ONCE
Qty: 0 | Refills: 0 | Status: COMPLETED | OUTPATIENT
Start: 2019-03-18 | End: 2019-03-18

## 2019-03-18 RX ADMIN — Medication 0.5 MILLIGRAM(S): at 22:59

## 2019-03-18 RX ADMIN — Medication 100 MILLIGRAM(S): at 05:00

## 2019-03-18 RX ADMIN — SIMVASTATIN 10 MILLIGRAM(S): 20 TABLET, FILM COATED ORAL at 22:58

## 2019-03-18 RX ADMIN — FAMOTIDINE 20 MILLIGRAM(S): 10 INJECTION INTRAVENOUS at 12:01

## 2019-03-18 RX ADMIN — CEFTRIAXONE 100 GRAM(S): 500 INJECTION, POWDER, FOR SOLUTION INTRAMUSCULAR; INTRAVENOUS at 18:44

## 2019-03-18 RX ADMIN — CARBIDOPA AND LEVODOPA 2 CAPSULE(S): 25; 100 TABLET ORAL at 05:00

## 2019-03-18 RX ADMIN — Medication 325 MILLIGRAM(S): at 22:58

## 2019-03-18 RX ADMIN — CARBIDOPA AND LEVODOPA 2 CAPSULE(S): 25; 100 TABLET ORAL at 18:31

## 2019-03-18 RX ADMIN — Medication 325 MILLIGRAM(S): at 23:28

## 2019-03-18 RX ADMIN — SERTRALINE 100 MILLIGRAM(S): 25 TABLET, FILM COATED ORAL at 12:01

## 2019-03-18 RX ADMIN — LUBIPROSTONE 8 MICROGRAM(S): 24 CAPSULE, GELATIN COATED ORAL at 05:06

## 2019-03-18 RX ADMIN — RASAGILINE 1 MILLIGRAM(S): 0.5 TABLET ORAL at 15:47

## 2019-03-18 RX ADMIN — SENNA PLUS 2 TABLET(S): 8.6 TABLET ORAL at 18:44

## 2019-03-18 RX ADMIN — CARBIDOPA AND LEVODOPA 2 CAPSULE(S): 25; 100 TABLET ORAL at 12:03

## 2019-03-18 RX ADMIN — Medication 100 MILLIGRAM(S): at 12:02

## 2019-03-18 RX ADMIN — TAMSULOSIN HYDROCHLORIDE 0.4 MILLIGRAM(S): 0.4 CAPSULE ORAL at 22:58

## 2019-03-18 RX ADMIN — Medication 0.5 MILLIGRAM(S): at 12:01

## 2019-03-18 RX ADMIN — CARBIDOPA AND LEVODOPA 2 CAPSULE(S): 25; 100 TABLET ORAL at 23:00

## 2019-03-18 RX ADMIN — LIDOCAINE 1 APPLICATION(S): 4 CREAM TOPICAL at 18:51

## 2019-03-18 RX ADMIN — ATROPA BELLADONNA AND OPIUM 1 SUPPOSITORY(S): 16.2; 6 SUPPOSITORY RECTAL at 10:03

## 2019-03-18 RX ADMIN — Medication 100 MILLIGRAM(S): at 18:45

## 2019-03-18 RX ADMIN — LIDOCAINE 1 APPLICATION(S): 4 CREAM TOPICAL at 23:00

## 2019-03-18 RX ADMIN — LUBIPROSTONE 8 MICROGRAM(S): 24 CAPSULE, GELATIN COATED ORAL at 18:32

## 2019-03-18 NOTE — CONSULT NOTE ADULT - SUBJECTIVE AND OBJECTIVE BOX
[facility] [place] [facility]   MD ALEAH COHEN MD ANGELA WONG, PA    TEL:  OFFICE: [phone]  DR DIOR CELL: [phone]  DR. CHAIDEZ CELL: [phone]  NESTOR HUANG CELL: [phone]      HPI:  78 year old male with history of parkinson's disease, prostate CA (s/p TURP x2 and radiation), HTN, HLD presenting to the ED with decreased urine output and suprapubic pain and hematuria. The patient was recently discharged from an OSH where he was treated for aspiration PNA. He was discharged to a rehab. While at the rehab, he was noted to have elevated Cr, and he was given IVF. He subsequently developed urinary retention, and had núñez placed by his urologist SUGEY Calle on 3/11. It was difficult placement, and he had hematuria since the núñez was placed. He had good output initially, however it dropped off thereafter and also developed severe pain. He had been taking bactrim initially but this was changed to augmentin by his urologist for presumed UTI. \  Nephrology consulted for renal failure. scr 1.5 in 2018 per wife scr was elevated to ~2 in the other hospital. on admission scr 2.8 peaked 3.34    Allergies:  No Known Allergies      PAST MEDICAL & SURGICAL HISTORY:  Gout  Prostate cancer  HLD (hyperlipidemia)  HTN (hypertension)  Depression  Parkinson's disease  S/P TURP      Home Medications Reviewed    Hospital Medications:   MEDICATIONS  (STANDING):  acetaminophen  IVPB .. 1000 milliGRAM(s) IV Intermittent once  allopurinol 100 milliGRAM(s) Oral daily  amantadine 100 milliGRAM(s) Oral two times a day  carbidopa 36.25 mG/levodopa 145 mG ER 2 Capsule(s) Oral every 6 hours  cefTRIAXone   IVPB 1 Gram(s) IV Intermittent every 24 hours  cefTRIAXone   IVPB      famotidine    Tablet 20 milliGRAM(s) Oral daily  lactated ringers. 1000 milliLiter(s) (75 mL/Hr) IV Continuous <Continuous>  lidocaine 2% Gel 1 Application(s) Topical four times a day  lubiprostone 8 MICROGram(s) Oral two times a day  rasagiline Tablet 1 milliGRAM(s) Oral daily  sertraline 100 milliGRAM(s) Oral daily  simvastatin 10 milliGRAM(s) Oral at bedtime  tamsulosin 0.4 milliGRAM(s) Oral at bedtime      SOCIAL HISTORY:  Denies ETOh, Smoking,     FAMILY HISTORY:  No pertinent family history in first degree relatives      REVIEW OF SYSTEMS:  CONSTITUTIONAL: No weakness, fevers or chills  EYES/ENT: No visual changes;  No vertigo or throat pain   NECK: No pain or stiffness  RESPIRATORY: No cough, wheezing, hemoptysis; No shortness of breath  CARDIOVASCULAR: No chest pain or palpitations.  GASTROINTESTINAL: No abdominal or epigastric pain. No nausea, vomiting, or hematemesis; No diarrhea or constipation. No melena or hematochezia.  GENITOURINARY: see HPI  NEUROLOGICAL: No numbness or weakness  SKIN: No itching, burning, rashes, or lesions   VASCULAR: No bilateral lower extremity edema.   All other review of systems is negative unless indicated above.    VITALS:  T(F): 97.6 (19 @ 14:31), Max: 98 (19 @ 21:16)  HR: 104 (19 @ 14:31)  BP: 110/72 (19 @ 14:31)  RR: 17 (19 @ 14:31)  SpO2: 97% (19 @ 14:31)  Wt(kg): --     @ 07:01  -   @ 07:00  --------------------------------------------------------  IN: 1935 mL / OUT: 0 mL / NET: 1935 mL     @ 07:01  -   @ 17:12  --------------------------------------------------------  IN: 700 mL / OUT: 0 mL / NET: 700 mL          PHYSICAL EXAM:  Constitutional: NAD  HEENT: anicteric sclera, oropharynx clear, MMM  Neck: No JVD  Respiratory: CTAB, no wheezes, rales or rhonchi  Cardiovascular: S1, S2, RRR  Gastrointestinal: BS+, soft, NT/ND  Extremities: No cyanosis or clubbing. No peripheral edema  Neurological: A/O x 3, no focal deficits  Psychiatric: Normal mood, normal affect  : + núñez.   Skin: No rashes      LABS:      140  |  106  |  45<H>  ----------------------------<  86  5.0   |  21<L>  |  3.10<H>    Ca    9.1      18 Mar 2019 07:21  Mg     2.1           Creatinine Trend: 3.10 <--, 3.34 <--, 3.04 <--, 2.88 <--                        9.6    10.1  )-----------( 402      ( 18 Mar 2019 07:23 )             29.2     Urine Studies:  Urinalysis Basic - ( 16 Mar 2019 04:02 )    Color: Red / Appearance: Turbid / S.029 / pH:   Gluc:  / Ketone: See Note  / Bili: See Note / Urobili: See Note   Blood:  / Protein: See Note / Nitrite: See Note   Leuk Esterase: See Note / RBC: >50 /hpf / WBC 15 /HPF   Sq Epi:  / Non Sq Epi:  / Bacteria:           RADIOLOGY & ADDITIONAL STUDIES:

## 2019-03-18 NOTE — PROGRESS NOTE ADULT - ASSESSMENT
77 y/o male PMHx Prostate CA s/p XRT 2010 presenting with hematuria and retention found to have OSMIN or unknown etiology.     -Keep Arrieta in place  -Continue CBI, wean as tolerated  -RN to irrigate catheter if leaking or concern for clots  -Trend Cr, awaiting today's BMP, yesterday 3.34  -UCx negative  -No hydro, no evidence of obstruction on renal U/s  -Will continue to follow 77 y/o male PMHx Prostate CA s/p XRT 2010 presenting with hematuria and retention found to have OSMIN or unknown etiology.     -Keep Arrieta in place  -Continue CBI, wean as tolerated  -RN to irrigate catheter if leaking or concern for clots  -Trend Cr, awaiting today's BMP, yesterday 3.34  -UCx negative  -No hydronephrosis, no evidence of obstruction on renal U/S  -Will continue to follow

## 2019-03-18 NOTE — PROGRESS NOTE ADULT - SUBJECTIVE AND OBJECTIVE BOX
Irrigated by RN x1 overnight, one small clot returned.  Remains on full blast CBI.    T(F): 97.3, Max: 98.4 (03-17-19 @ 15:27)  HR: 74  BP: 124/72  SpO2: 99%    Gen NAD, resting   Arrieta in place draining clear light pink urine on full blast CBI    03-17 @ 07:52  WBC 12.27 / Hct 29.4  / SCr 3.34       .Urine Catheterized  03-16  No growth

## 2019-03-18 NOTE — CONSULT NOTE ADULT - ASSESSMENT
78 year old male with history of parkinson's disease, prostate CA (s/p TURP x2 and radiation), HTN, HLD presenting to the ED with decreased urine output and suprapubic pain and hematuria. found to have OSMIN    OSMIN  ? etiology. peaked 3.34  patient was taking bactrim then was changed to augmentin for presumed UTI.   renal us showed no hydro  s/p núñez  also have hypotension   OSMIN possible ATN vs AIN  lisinopril on hold, currently on LR @ 75cc/hr. continue for now. monitor fluid status  check cbc with diff in the AM  renal function improving continue to monitor  avoid nephrotoxic agents.     Hematuria  f/u  78 year old male with history of parkinson's disease, prostate CA (s/p TURP x2 and radiation), HTN, HLD presenting to the ED with decreased urine output and suprapubic pain and hematuria. found to have OSMIN    OSMIN  ? etiology. peaked 3.34  Baseline Scr ? 1.5 (o3/18)  patient was taking bactrim then was changed to augmentin for presumed UTI.   renal us showed no hydro  s/p núñez  also have hypotension   OSMIN possible ATN vs AIN  Renal US negative for obstruction  lisinopril on hold, currently on LR @ 75cc/hr. continue for now. monitor fluid status  check cbc with diff in the AM  Urinary work up will not be helpful in view of bladder irrigation  renal function improving continue to monitor  avoid nephrotoxic agents.     Hematuria  f/u

## 2019-03-18 NOTE — PROGRESS NOTE ADULT - SUBJECTIVE AND OBJECTIVE BOX
Patient is a 78y old  Male who presents with a chief complaint of difficulty urinating, hematuria (18 Mar 2019 17:11)      SUBJECTIVE / OVERNIGHT EVENTS:  having some bladder spasm.  urology started meds.  CBI in progress  Cr still around 3.  no cp, no sob.         Vital Signs Last 24 Hrs  T(C): 36.7 (18 Mar 2019 17:12), Max: 36.7 (17 Mar 2019 21:16)  T(F): 98 (18 Mar 2019 17:12), Max: 98 (17 Mar 2019 21:16)  HR: 78 (18 Mar 2019 17:12) (61 - 104)  BP: 117/73 (18 Mar 2019 17:12) (90/52 - 124/72)  BP(mean): --  RR: 18 (18 Mar 2019 17:12) (17 - 19)  SpO2: 97% (18 Mar 2019 17:12) (94% - 99%)  I&O's Summary    17 Mar 2019 07:01  -  18 Mar 2019 07:00  --------------------------------------------------------  IN: 1935 mL / OUT: 0 mL / NET: 1935 mL    18 Mar 2019 07:01  -  18 Mar 2019 19:56  --------------------------------------------------------  IN: 700 mL / OUT: 0 mL / NET: 700 mL      PHYSICAL EXAM:  GENERAL: NAD, Comfortable, +parkinson features of masklike facies, mild tremor, rigidity  HEAD:  Atraumatic, Normocephalic  EYES: EOMI, PERRLA, conjunctiva and sclera clear  NECK: Supple, No JVD  CHEST/LUNG: Clear to auscultation bilaterally; No wheeze  HEART: Regular rate and rhythm; No murmurs, rubs, or gallops  ABDOMEN: Soft, Nontender, Nondistended; Bowel sounds present  Neuro: AAO x 2, slow speech, no focal deficit  : CBI/Arrieta in place, gross hematuria with clots, neg CVAT   EXTREMITIES:  2+ Peripheral Pulses, No clubbing, cyanosis, or edema, venous stasis changes  SKIN: No rashes or lesions      LABS:                        9.6    10.1  )-----------( 402      ( 18 Mar 2019 07:23 )             29.2     03-18    140  |  106  |  45<H>  ----------------------------<  86  5.0   |  21<L>  |  3.10<H>    Ca    9.1      18 Mar 2019 07:21  Mg     2.1     03-18        CAPILLARY BLOOD GLUCOSE                RADIOLOGY & ADDITIONAL TESTS:    Imaging Personally Reviewed:  [x] YES  [ ] NO    Consultant(s) Notes Reviewed:  [x] YES  [ ] NO      MEDICATIONS  (STANDING):  acetaminophen  IVPB .. 1000 milliGRAM(s) IV Intermittent once  allopurinol 100 milliGRAM(s) Oral daily  amantadine 100 milliGRAM(s) Oral two times a day  carbidopa 36.25 mG/levodopa 145 mG ER 2 Capsule(s) Oral every 6 hours  cefTRIAXone   IVPB 1 Gram(s) IV Intermittent every 24 hours  cefTRIAXone   IVPB      famotidine    Tablet 20 milliGRAM(s) Oral daily  lactated ringers. 1000 milliLiter(s) (75 mL/Hr) IV Continuous <Continuous>  lidocaine 2% Gel 1 Application(s) Topical four times a day  lubiprostone 8 MICROGram(s) Oral two times a day  rasagiline Tablet 1 milliGRAM(s) Oral daily  sertraline 100 milliGRAM(s) Oral daily  simvastatin 10 milliGRAM(s) Oral at bedtime  tamsulosin 0.4 milliGRAM(s) Oral at bedtime    MEDICATIONS  (PRN):  ALPRAZolam 0.5 milliGRAM(s) Oral two times a day PRN anxiety  belladonna 16.2 mG/opium 30 mg Suppository 1 Suppository(s) Rectal every 6 hours PRN bladder spasms  senna 2 Tablet(s) Oral at bedtime PRN Constipation      Care Discussed with Consultants/Other Providers [x] YES  [ ] NO    HEALTH ISSUES - PROBLEM Dx:  Need for prophylactic measure  Constipation  Hyperlipidemia  Parkinsons disease  Urinary retention  Acute UTI  OSMIN (acute kidney injury)

## 2019-03-18 NOTE — PROGRESS NOTE ADULT - ASSESSMENT
78 year old male with history of parkinson's disease, prostate CA (s/p TURP x2 and radiation), HTN, HLD presenting to the ED from rehab with decreased urine output and suprapubic pain, admitted with OSMIN and concerns for UTI:      Problem Selector:  PROBLEM DIAGNOSES  Problem: OSMIN (acute kidney injury)  Assessment and Plan: -Cr eelvated to 3.04 with last documented Cr of 1.5 here, acute elevation. Per patient's wife (Carey), Cr was up to 2s in rehab when patient had urinary retention there.   -Continue núñez, continue to trend Cr  -renal u/s without hydronephrosis  -avoid nephrotoxic drugs   -pt was also using aleve as an outpt, avoid NSAIDs  -obtain urine lytes and urine cr   -monitor urine output   - hold Lisinopril.  - renal consulted    Problem: Acute UTI  Assessment and Plan: -pt was on Augmentin as an outpt   -f/u urine culture here  - c/w ceftriaxone in the interim, given symptoms and leukocytosis     Problem: Urinary retention  Assessment and Plan: -history of prostate CA with radiation and TURP   -continue with núñez  -now on CBI   - urology f/u appreciated  -continue flomax     Problem: Parkinsons disease  Assessment and Plan: -continue with carbidopa/levodopa and azilect    -continue with xanax for anxiety     Problem: Hyperlipidemia  Assessment and Plan: -continue with simvastatin     Problem: Constipation  Assessment and Plan: -cotninue with senna and lubiprostone     Problem: Need for prophylactic measure  Assessment and Plan: -holding chemical DVT ppx due to ongoing bleeding. 78 year old male with history of parkinson's disease, prostate CA (s/p TURP x2 and radiation), HTN, HLD presenting to the ED from rehab with decreased urine output and suprapubic pain, admitted with OSMIN and concerns for UTI:      Problem Selector:  PROBLEM DIAGNOSES  Problem: OSMIN (acute kidney injury): pre-renal vs. a component of ATN  Assessment and Plan: -Cr eelvated to 3.04 with last documented Cr of 1.5 here, acute elevation. Per patient's wife (Carey), Cr was up to 2s in rehab when patient had urinary retention there.   -Continue núñez, continue to trend Cr  -renal u/s without hydronephrosis  -avoid nephrotoxic drugs   -pt was also using aleve as an outpt, avoid NSAIDs  -obtain urine lytes and urine cr   -monitor urine output   - hold Lisinopril.  - renal consulted  	  Problem: Acute UTI  Assessment and Plan: -pt was on Augmentin as an outpt   -f/u urine culture here  - c/w ceftriaxone in the interim, given symptoms and leukocytosis     Problem: Urinary retention  Assessment and Plan: -history of prostate CA with radiation and TURP   -continue with núñez  -now on CBI   - urology f/u appreciated  -continue flomax     Problem: Parkinsons disease  Assessment and Plan: -continue with carbidopa/levodopa and azilect    -continue with xanax for anxiety     Problem: Hyperlipidemia  Assessment and Plan: -continue with simvastatin     Problem: Constipation  Assessment and Plan: -cotninue with senna and lubiprostone     Problem: Need for prophylactic measure  Assessment and Plan: -holding chemical DVT ppx due to ongoing bleeding.

## 2019-03-18 NOTE — CHART NOTE - NSCHARTNOTEFT_GEN_A_CORE
TANK VARGAS  78y Male    Notified by RN BP = 90/52. Patient seen and examined. Resting comfortably in bed. States baseline SBP = 100s. Denies HA, dizziness, SOB, CP, abdominal pain, N/V.       Vital Signs Last 24 Hrs  T(F): 98   HR: 79   BP: 90/52  RR: 17   SpO2: 95%     Physical Exam:   Constitutional: AOx3. NAD.  Neuro:  Grossly intact   Cardiovascular: +S1 S2. RRR.  No murmurs.  No LE edema.  Respiratory:  Even, unlabored.  Clear lungs bilaterally. No wheezing, rhonchi, or crackles.  Gastrointestinal: +BS X4 active. Soft. Nontender. Nondistended   Extremities/Vascular: +2 pulses bilaterally.   Skin:  warm, dry    HPI:   78 year old male with history of parkinson's disease, prostate CA (s/p TURP x2 and radiation), HTN, HLD presenting to the ED with decreased urine output and suprapubic pain. The patient was recently discharged from an OSH where he was treated for aspiration PNA. He was discharged to a rehab. While at the rehab, he was noted to have elevated Cr, and he was given IVF. He subsequently developed urinary retention, and had núñez placed by his urologist SUGEY Calle on 3/11. It was difficult placement, and he had hematuria since the núñez was placed. He had good output initially, however it dropped off thereafter and also developed severe pain. He had been taking bactrim initially but this was changed to augmentin by his urologist for presumed UTI. He presented to the ED with severe pain. In the ED, he was noted to have minimal output via the núñez. It was changed, and bladder irrigation was done, with clots being removed. CBI was started. Currently patient denies any pain. Reports discomfort from having catheter. (16 Mar 2019 17:12)    Low BP   -bolus x1 - repeat BP 98/62   -continue fluids   -continue current regimen     will continue to monitor   will endorse to day team       Mary Ramachandran PA-C  [id number]

## 2019-03-19 DIAGNOSIS — R33.9 RETENTION OF URINE, UNSPECIFIED: ICD-10-CM

## 2019-03-19 DIAGNOSIS — R31.0 GROSS HEMATURIA: ICD-10-CM

## 2019-03-19 LAB
ANION GAP SERPL CALC-SCNC: 12 MMOL/L — SIGNIFICANT CHANGE UP (ref 5–17)
ANION GAP SERPL CALC-SCNC: 13 MMOL/L — SIGNIFICANT CHANGE UP (ref 5–17)
BUN SERPL-MCNC: 37 MG/DL — HIGH (ref 7–23)
BUN SERPL-MCNC: 43 MG/DL — HIGH (ref 7–23)
CALCIUM SERPL-MCNC: 9.1 MG/DL — SIGNIFICANT CHANGE UP (ref 8.4–10.5)
CALCIUM SERPL-MCNC: 9.1 MG/DL — SIGNIFICANT CHANGE UP (ref 8.4–10.5)
CHLORIDE SERPL-SCNC: 107 MMOL/L — SIGNIFICANT CHANGE UP (ref 96–108)
CHLORIDE SERPL-SCNC: 108 MMOL/L — SIGNIFICANT CHANGE UP (ref 96–108)
CO2 SERPL-SCNC: 21 MMOL/L — LOW (ref 22–31)
CO2 SERPL-SCNC: 21 MMOL/L — LOW (ref 22–31)
CREAT SERPL-MCNC: 2.16 MG/DL — HIGH (ref 0.5–1.3)
CREAT SERPL-MCNC: 2.72 MG/DL — HIGH (ref 0.5–1.3)
GLUCOSE SERPL-MCNC: 110 MG/DL — HIGH (ref 70–99)
GLUCOSE SERPL-MCNC: 79 MG/DL — SIGNIFICANT CHANGE UP (ref 70–99)
HCT VFR BLD CALC: 27.2 % — LOW (ref 39–50)
HGB BLD-MCNC: 8.7 G/DL — LOW (ref 13–17)
MCHC RBC-ENTMCNC: 30 PG — SIGNIFICANT CHANGE UP (ref 27–34)
MCHC RBC-ENTMCNC: 32 GM/DL — SIGNIFICANT CHANGE UP (ref 32–36)
MCV RBC AUTO: 93.8 FL — SIGNIFICANT CHANGE UP (ref 80–100)
PLATELET # BLD AUTO: 411 K/UL — HIGH (ref 150–400)
POTASSIUM SERPL-MCNC: 5 MMOL/L — SIGNIFICANT CHANGE UP (ref 3.5–5.3)
POTASSIUM SERPL-MCNC: 5.4 MMOL/L — HIGH (ref 3.5–5.3)
POTASSIUM SERPL-SCNC: 5 MMOL/L — SIGNIFICANT CHANGE UP (ref 3.5–5.3)
POTASSIUM SERPL-SCNC: 5.4 MMOL/L — HIGH (ref 3.5–5.3)
RBC # BLD: 2.9 M/UL — LOW (ref 4.2–5.8)
RBC # FLD: 13.3 % — SIGNIFICANT CHANGE UP (ref 10.3–14.5)
SODIUM SERPL-SCNC: 141 MMOL/L — SIGNIFICANT CHANGE UP (ref 135–145)
SODIUM SERPL-SCNC: 141 MMOL/L — SIGNIFICANT CHANGE UP (ref 135–145)
WBC # BLD: 10.9 K/UL — HIGH (ref 3.8–10.5)
WBC # FLD AUTO: 10.9 K/UL — HIGH (ref 3.8–10.5)

## 2019-03-19 PROCEDURE — 99232 SBSQ HOSP IP/OBS MODERATE 35: CPT

## 2019-03-19 RX ORDER — SODIUM POLYSTYRENE SULFONATE 4.1 MEQ/G
15 POWDER, FOR SUSPENSION ORAL ONCE
Qty: 0 | Refills: 0 | Status: COMPLETED | OUTPATIENT
Start: 2019-03-19 | End: 2019-03-19

## 2019-03-19 RX ORDER — ACETAMINOPHEN 500 MG
1000 TABLET ORAL ONCE
Qty: 0 | Refills: 0 | Status: COMPLETED | OUTPATIENT
Start: 2019-03-19 | End: 2019-03-25

## 2019-03-19 RX ORDER — ACETAMINOPHEN 500 MG
650 TABLET ORAL EVERY 6 HOURS
Qty: 0 | Refills: 0 | Status: DISCONTINUED | OUTPATIENT
Start: 2019-03-19 | End: 2019-03-26

## 2019-03-19 RX ADMIN — FAMOTIDINE 20 MILLIGRAM(S): 10 INJECTION INTRAVENOUS at 12:22

## 2019-03-19 RX ADMIN — Medication 100 MILLIGRAM(S): at 12:22

## 2019-03-19 RX ADMIN — LIDOCAINE 1 APPLICATION(S): 4 CREAM TOPICAL at 22:27

## 2019-03-19 RX ADMIN — Medication 100 MILLIGRAM(S): at 05:51

## 2019-03-19 RX ADMIN — RASAGILINE 1 MILLIGRAM(S): 0.5 TABLET ORAL at 12:22

## 2019-03-19 RX ADMIN — Medication 100 MILLIGRAM(S): at 17:41

## 2019-03-19 RX ADMIN — LIDOCAINE 1 APPLICATION(S): 4 CREAM TOPICAL at 17:42

## 2019-03-19 RX ADMIN — LIDOCAINE 1 APPLICATION(S): 4 CREAM TOPICAL at 05:52

## 2019-03-19 RX ADMIN — Medication 400 MILLIGRAM(S): at 21:08

## 2019-03-19 RX ADMIN — SIMVASTATIN 10 MILLIGRAM(S): 20 TABLET, FILM COATED ORAL at 21:08

## 2019-03-19 RX ADMIN — CEFTRIAXONE 100 GRAM(S): 500 INJECTION, POWDER, FOR SOLUTION INTRAMUSCULAR; INTRAVENOUS at 17:44

## 2019-03-19 RX ADMIN — TAMSULOSIN HYDROCHLORIDE 0.4 MILLIGRAM(S): 0.4 CAPSULE ORAL at 21:08

## 2019-03-19 RX ADMIN — ATROPA BELLADONNA AND OPIUM 1 SUPPOSITORY(S): 16.2; 6 SUPPOSITORY RECTAL at 00:53

## 2019-03-19 RX ADMIN — SODIUM CHLORIDE 75 MILLILITER(S): 9 INJECTION, SOLUTION INTRAVENOUS at 12:33

## 2019-03-19 RX ADMIN — CARBIDOPA AND LEVODOPA 2 CAPSULE(S): 25; 100 TABLET ORAL at 12:23

## 2019-03-19 RX ADMIN — LUBIPROSTONE 8 MICROGRAM(S): 24 CAPSULE, GELATIN COATED ORAL at 17:40

## 2019-03-19 RX ADMIN — SERTRALINE 100 MILLIGRAM(S): 25 TABLET, FILM COATED ORAL at 12:22

## 2019-03-19 RX ADMIN — LUBIPROSTONE 8 MICROGRAM(S): 24 CAPSULE, GELATIN COATED ORAL at 05:51

## 2019-03-19 RX ADMIN — SODIUM POLYSTYRENE SULFONATE 15 GRAM(S): 4.1 POWDER, FOR SUSPENSION ORAL at 14:07

## 2019-03-19 RX ADMIN — ATROPA BELLADONNA AND OPIUM 1 SUPPOSITORY(S): 16.2; 6 SUPPOSITORY RECTAL at 12:02

## 2019-03-19 RX ADMIN — Medication 1000 MILLIGRAM(S): at 21:40

## 2019-03-19 RX ADMIN — CARBIDOPA AND LEVODOPA 2 CAPSULE(S): 25; 100 TABLET ORAL at 17:41

## 2019-03-19 RX ADMIN — ATROPA BELLADONNA AND OPIUM 1 SUPPOSITORY(S): 16.2; 6 SUPPOSITORY RECTAL at 18:05

## 2019-03-19 RX ADMIN — ATROPA BELLADONNA AND OPIUM 1 SUPPOSITORY(S): 16.2; 6 SUPPOSITORY RECTAL at 17:33

## 2019-03-19 RX ADMIN — CARBIDOPA AND LEVODOPA 2 CAPSULE(S): 25; 100 TABLET ORAL at 22:27

## 2019-03-19 RX ADMIN — ATROPA BELLADONNA AND OPIUM 1 SUPPOSITORY(S): 16.2; 6 SUPPOSITORY RECTAL at 22:26

## 2019-03-19 RX ADMIN — ATROPA BELLADONNA AND OPIUM 1 SUPPOSITORY(S): 16.2; 6 SUPPOSITORY RECTAL at 23:00

## 2019-03-19 RX ADMIN — LIDOCAINE 1 APPLICATION(S): 4 CREAM TOPICAL at 12:23

## 2019-03-19 RX ADMIN — CARBIDOPA AND LEVODOPA 2 CAPSULE(S): 25; 100 TABLET ORAL at 05:51

## 2019-03-19 RX ADMIN — ATROPA BELLADONNA AND OPIUM 1 SUPPOSITORY(S): 16.2; 6 SUPPOSITORY RECTAL at 00:23

## 2019-03-19 RX ADMIN — ATROPA BELLADONNA AND OPIUM 1 SUPPOSITORY(S): 16.2; 6 SUPPOSITORY RECTAL at 12:35

## 2019-03-19 RX ADMIN — Medication 0.5 MILLIGRAM(S): at 16:03

## 2019-03-19 NOTE — PROGRESS NOTE ADULT - SUBJECTIVE AND OBJECTIVE BOX
[facility] [place] [facility]   MD ALEAH COHEN MD RUORU WONG, PA    TEL:  OFFICE: [phone]  DR DIOR CELL: [phone]  CECILLE HUANG CELL: [phone]  DR. CHAIDEZ CELL: [phone]    RENAL FOLLOW UP NOTE  --------------------------------------------------------------------------------  HPI:      Pt seen and examined at bedside.   Kailyn MALDONADO, chest pain     PAST HISTORY  --------------------------------------------------------------------------------  No significant changes to PMH, PSH, FHx, SHx, unless otherwise noted    ALLERGIES & MEDICATIONS  --------------------------------------------------------------------------------  Allergies    No Known Allergies    Intolerances      Standing Inpatient Medications  acetaminophen  IVPB .. 1000 milliGRAM(s) IV Intermittent once  allopurinol 100 milliGRAM(s) Oral daily  amantadine 100 milliGRAM(s) Oral two times a day  carbidopa 36.25 mG/levodopa 145 mG ER 2 Capsule(s) Oral every 6 hours  cefTRIAXone   IVPB 1 Gram(s) IV Intermittent every 24 hours  cefTRIAXone   IVPB      famotidine    Tablet 20 milliGRAM(s) Oral daily  lactated ringers. 1000 milliLiter(s) IV Continuous <Continuous>  lidocaine 2% Gel 1 Application(s) Topical four times a day  lubiprostone 8 MICROGram(s) Oral two times a day  rasagiline Tablet 1 milliGRAM(s) Oral daily  sertraline 100 milliGRAM(s) Oral daily  simvastatin 10 milliGRAM(s) Oral at bedtime  sodium polystyrene sulfonate Suspension 15 Gram(s) Oral once  tamsulosin 0.4 milliGRAM(s) Oral at bedtime    PRN Inpatient Medications  ALPRAZolam 0.5 milliGRAM(s) Oral two times a day PRN  belladonna 16.2 mG/opium 30 mg Suppository 1 Suppository(s) Rectal every 6 hours PRN  senna 2 Tablet(s) Oral at bedtime PRN      REVIEW OF SYSTEMS  --------------------------------------------------------------------------------  General: no fever  CVS: no chest pain  RESP: no sob, no cough      VITALS/PHYSICAL EXAM  --------------------------------------------------------------------------------  T(C): 36.4 (03-19-19 @ 10:45), Max: 36.8 (03-18-19 @ 21:04)  HR: 83 (03-19-19 @ 10:45) (78 - 104)  BP: 107/69 (03-19-19 @ 10:45) (107/69 - 128/68)  RR: 16 (03-19-19 @ 10:45) (16 - 18)  SpO2: 93% (03-19-19 @ 10:45) (93% - 97%)  Wt(kg): --        03-18-19 @ 07:01  -  03-19-19 @ 07:00  --------------------------------------------------------  IN: 3250 mL / OUT: 0 mL / NET: 3250 mL    03-19-19 @ 07:01  -  03-19-19 @ 11:46  --------------------------------------------------------  IN: 200 mL / OUT: 0 mL / NET: 200 mL      Physical Exam:  	Gen: NAD  	HEENT: MMM  	Pulm: CTA B/L  	CV: S1S2  	Abd: Soft, +BS  	Ext: No LE edema B/L                      Neuro: Awake   	Skin: Warm and Dry   	BOB núñez present    LABS/STUDIES  --------------------------------------------------------------------------------              8.7    10.9  >-----------<  411      [03-19-19 @ 07:23]              27.2     141  |  108  |  43  ----------------------------<  79      [03-19-19 @ 07:22]  5.4   |  21  |  2.72        Ca     9.1     [03-19-19 @ 07:22]      Mg     2.1     [03-18-19 @ 07:21]            Creatinine Trend:  SCr 2.72 [03-19 @ 07:22]  SCr 3.10 [03-18 @ 07:21]  SCr 3.34 [03-17 @ 05:43]  SCr 3.04 [03-16 @ 06:30]  SCr 2.88 [03-16 @ 03:00]    Urinalysis - [03-16-19 @ 04:02]      Color Red / Appearance Turbid / SG 1.029 / pH See Note      Gluc See Note / Ketone See Note  / Bili See Note / Urobili See Note       Blood See Note / Protein See Note / Leuk Est See Note / Nitrite See Note      RBC >50 / WBC 15 / Hyaline  / Gran  / Sq Epi  / Non Sq Epi  / Bacteria

## 2019-03-19 NOTE — PROGRESS NOTE ADULT - ASSESSMENT
78 year old male with history of parkinson's disease, prostate CA (s/p TURP x2 and radiation), HTN, HLD presenting to the ED from rehab with decreased urine output and suprapubic pain, admitted with OSMIN and concerns for UTI:      Problem Selector:  PROBLEM DIAGNOSES  Problem: OSMIN (acute kidney injury): pre-renal vs. a component of ATN  Assessment and Plan: -Cr eelvated to 3.04 with last documented Cr of 1.5 here, acute elevation. Per patient's wife (Fiance of 20 years), Cr was up to 2s in rehab when patient had urinary retention there.   -Continue núñez/CBI, continue to trend Cr  -renal u/s without hydronephrosis  -avoid nephrotoxic drugs   -pt was also using aleve as an outpt, avoid NSAIDs  -monitor urine output   - hold Lisinopril.  - renal eval appreciated   	  Problem: Acute UTI  Assessment and Plan: -pt was on Augmentin as an outpt   urine culture no growth  c/w ceftriaxone IV given symptoms and leukocytosis (7-10 days course should suffice)     Problem: Urinary retention  Assessment and Plan: -history of prostate CA with radiation and TURP   -continue with núñez/CBI  - urology f/u appreciated  -continue flomax     Problem: Parkinsons disease  Assessment and Plan: -continue with carbidopa/levodopa and azilect    -continue with xanax for anxiety     Problem: Hyperlipidemia  Assessment and Plan: -continue with simvastatin     Problem: Constipation  Assessment and Plan: -cotninue with senna and lubiprostone     Problem: Need for prophylactic measure  Assessment and Plan: -holding chemical DVT ppx due to ongoing bleeding.

## 2019-03-19 NOTE — PROGRESS NOTE ADULT - SUBJECTIVE AND OBJECTIVE BOX
Patient is a 78y old  Male who presents with a chief complaint of difficulty urinating, hematuria (19 Mar 2019 11:46)      SUBJECTIVE / OVERNIGHT EVENTS:  feels better  pain/spasm better controlled.  the wife at bedside.  CBI still pink, but lighter in color.  no cp, no sob, no n/v/d. no abdominal pain.  no headache, no dizziness.   wants tylenol PRN      Vital Signs Last 24 Hrs  T(C): 36.4 (19 Mar 2019 10:45), Max: 36.8 (18 Mar 2019 21:04)  T(F): 97.5 (19 Mar 2019 10:45), Max: 98.3 (18 Mar 2019 21:04)  HR: 83 (19 Mar 2019 10:45) (78 - 104)  BP: 107/69 (19 Mar 2019 10:45) (107/69 - 128/68)  BP(mean): --  RR: 16 (19 Mar 2019 10:45) (16 - 18)  SpO2: 93% (19 Mar 2019 10:45) (93% - 97%)  I&O's Summary    18 Mar 2019 07:01  -  19 Mar 2019 07:00  --------------------------------------------------------  IN: 3250 mL / OUT: 0 mL / NET: 3250 mL    19 Mar 2019 07:01  -  19 Mar 2019 12:54  --------------------------------------------------------  IN: 200 mL / OUT: 0 mL / NET: 200 mL      PHYSICAL EXAM:  GENERAL: NAD, Comfortable, +parkinson features of masklike facies, mild tremor, rigidity  HEAD:  Atraumatic, Normocephalic  EYES: EOMI, PERRLA, conjunctiva and sclera clear  NECK: Supple, No JVD  CHEST/LUNG: Clear to auscultation bilaterally; No wheeze  HEART: Regular rate and rhythm; No murmurs, rubs, or gallops  ABDOMEN: Soft, Nontender, Nondistended; Bowel sounds present  Neuro: AAO x 2, slow speech, no focal deficit  : CBI/Arrieta in place, gross hematuria with clots, color much lighter now. pinkish. neg CVAT   EXTREMITIES:  2+ Peripheral Pulses, No clubbing, cyanosis, or edema, venous stasis changes  SKIN: No rashes or lesions      LABS:                        8.7    10.9  )-----------( 411      ( 19 Mar 2019 07:23 )             27.2     03-19    141  |  108  |  43<H>  ----------------------------<  79  5.4<H>   |  21<L>  |  2.72<H>    Ca    9.1      19 Mar 2019 07:22  Mg     2.1     03-18        CAPILLARY BLOOD GLUCOSE                RADIOLOGY & ADDITIONAL TESTS:    Imaging Personally Reviewed:  [x] YES  [ ] NO    Consultant(s) Notes Reviewed:  [x] YES  [ ] NO      MEDICATIONS  (STANDING):  acetaminophen  IVPB .. 1000 milliGRAM(s) IV Intermittent once  allopurinol 100 milliGRAM(s) Oral daily  amantadine 100 milliGRAM(s) Oral two times a day  carbidopa 36.25 mG/levodopa 145 mG ER 2 Capsule(s) Oral every 6 hours  cefTRIAXone   IVPB 1 Gram(s) IV Intermittent every 24 hours  cefTRIAXone   IVPB      famotidine    Tablet 20 milliGRAM(s) Oral daily  lactated ringers. 1000 milliLiter(s) (75 mL/Hr) IV Continuous <Continuous>  lidocaine 2% Gel 1 Application(s) Topical four times a day  lubiprostone 8 MICROGram(s) Oral two times a day  rasagiline Tablet 1 milliGRAM(s) Oral daily  sertraline 100 milliGRAM(s) Oral daily  simvastatin 10 milliGRAM(s) Oral at bedtime  sodium polystyrene sulfonate Suspension 15 Gram(s) Oral once  tamsulosin 0.4 milliGRAM(s) Oral at bedtime    MEDICATIONS  (PRN):  acetaminophen   Tablet .. 650 milliGRAM(s) Oral every 6 hours PRN Moderate Pain (4 - 6)  ALPRAZolam 0.5 milliGRAM(s) Oral two times a day PRN anxiety  belladonna 16.2 mG/opium 30 mg Suppository 1 Suppository(s) Rectal every 6 hours PRN bladder spasms  senna 2 Tablet(s) Oral at bedtime PRN Constipation      Care Discussed with Consultants/Other Providers [x] YES  [ ] NO    HEALTH ISSUES - PROBLEM Dx:  Need for prophylactic measure  Constipation  Hyperlipidemia  Parkinsons disease  Urinary retention  Acute UTI  OSMIN (acute kidney injury)

## 2019-03-19 NOTE — PROGRESS NOTE ADULT - ASSESSMENT
78 year old male with history of parkinson's disease, prostate CA (s/p TURP x2 and radiation), HTN, HLD presenting to the ED with decreased urine output and suprapubic pain and hematuria. found to have OSMIN    OSMIN  peaked 3.34  Baseline Scr ? 1.5 (3/18)  OSMIN likely sec to  ATN   s/p núñez  Renal US negative for obstruction  continue to hold lisinopril   Urinary work up will not be helpful in view of bladder irrigation  renal function improving continue to monitor  avoid nephrotoxic agents.     Hyperkalemia  likely sec to RF  GIven Kayaxalte by primary team  Change to low K diet  MOnitor serum K    Hematuria  f/u   On CBI

## 2019-03-19 NOTE — PROGRESS NOTE ADULT - ASSESSMENT
79 y/o male PMHx Prostate CA s/p XRT 2010 presenting with hematuria and retention found to have OSMIN or unknown etiology.     -Keep Arrieta in place  -Continue CBI, wean as tolerated  -RN to irrigate catheter if leaking or concern for clots  -Trend Cr, 3.34-> 3.10  -UCx negative  -No hydro, no evidence of obstruction on renal U/s  -Will continue to follow

## 2019-03-19 NOTE — PROGRESS NOTE ADULT - SUBJECTIVE AND OBJECTIVE BOX
Patient remains on CBI.  Color improving, slow from full blast to fast drip.    T(F): 97.9, Max: 98.3 (03-18-19 @ 21:04)  HR: 97  BP: 128/68  SpO2: 96%    Gen NAD   Arrieta in place draining clear pink urine on fast drip    03-18 @ 07:23  WBC 10.1  / Hct 29.2  / SCr 3.1      UCx: neg

## 2019-03-20 LAB
ANION GAP SERPL CALC-SCNC: 12 MMOL/L — SIGNIFICANT CHANGE UP (ref 5–17)
BUN SERPL-MCNC: 35 MG/DL — HIGH (ref 7–23)
CALCIUM SERPL-MCNC: 9 MG/DL — SIGNIFICANT CHANGE UP (ref 8.4–10.5)
CHLORIDE SERPL-SCNC: 107 MMOL/L — SIGNIFICANT CHANGE UP (ref 96–108)
CO2 SERPL-SCNC: 22 MMOL/L — SIGNIFICANT CHANGE UP (ref 22–31)
CREAT SERPL-MCNC: 1.97 MG/DL — HIGH (ref 0.5–1.3)
GLUCOSE SERPL-MCNC: 83 MG/DL — SIGNIFICANT CHANGE UP (ref 70–99)
HCT VFR BLD CALC: 27 % — LOW (ref 39–50)
HGB BLD-MCNC: 8.7 G/DL — LOW (ref 13–17)
MCHC RBC-ENTMCNC: 30.3 PG — SIGNIFICANT CHANGE UP (ref 27–34)
MCHC RBC-ENTMCNC: 32.2 GM/DL — SIGNIFICANT CHANGE UP (ref 32–36)
MCV RBC AUTO: 94.1 FL — SIGNIFICANT CHANGE UP (ref 80–100)
PLATELET # BLD AUTO: 399 K/UL — SIGNIFICANT CHANGE UP (ref 150–400)
POTASSIUM SERPL-MCNC: 4.7 MMOL/L — SIGNIFICANT CHANGE UP (ref 3.5–5.3)
POTASSIUM SERPL-SCNC: 4.7 MMOL/L — SIGNIFICANT CHANGE UP (ref 3.5–5.3)
RBC # BLD: 2.87 M/UL — LOW (ref 4.2–5.8)
RBC # FLD: 13.2 % — SIGNIFICANT CHANGE UP (ref 10.3–14.5)
SODIUM SERPL-SCNC: 141 MMOL/L — SIGNIFICANT CHANGE UP (ref 135–145)
WBC # BLD: 10.9 K/UL — HIGH (ref 3.8–10.5)
WBC # FLD AUTO: 10.9 K/UL — HIGH (ref 3.8–10.5)

## 2019-03-20 PROCEDURE — 99232 SBSQ HOSP IP/OBS MODERATE 35: CPT

## 2019-03-20 RX ADMIN — Medication 650 MILLIGRAM(S): at 13:18

## 2019-03-20 RX ADMIN — LIDOCAINE 1 APPLICATION(S): 4 CREAM TOPICAL at 19:11

## 2019-03-20 RX ADMIN — CEFTRIAXONE 100 GRAM(S): 500 INJECTION, POWDER, FOR SOLUTION INTRAMUSCULAR; INTRAVENOUS at 19:10

## 2019-03-20 RX ADMIN — ATROPA BELLADONNA AND OPIUM 1 SUPPOSITORY(S): 16.2; 6 SUPPOSITORY RECTAL at 06:45

## 2019-03-20 RX ADMIN — Medication 650 MILLIGRAM(S): at 22:57

## 2019-03-20 RX ADMIN — FAMOTIDINE 20 MILLIGRAM(S): 10 INJECTION INTRAVENOUS at 13:17

## 2019-03-20 RX ADMIN — TAMSULOSIN HYDROCHLORIDE 0.4 MILLIGRAM(S): 0.4 CAPSULE ORAL at 22:57

## 2019-03-20 RX ADMIN — Medication 100 MILLIGRAM(S): at 05:12

## 2019-03-20 RX ADMIN — CARBIDOPA AND LEVODOPA 2 CAPSULE(S): 25; 100 TABLET ORAL at 22:57

## 2019-03-20 RX ADMIN — ATROPA BELLADONNA AND OPIUM 1 SUPPOSITORY(S): 16.2; 6 SUPPOSITORY RECTAL at 06:26

## 2019-03-20 RX ADMIN — CARBIDOPA AND LEVODOPA 2 CAPSULE(S): 25; 100 TABLET ORAL at 13:17

## 2019-03-20 RX ADMIN — LIDOCAINE 1 APPLICATION(S): 4 CREAM TOPICAL at 22:57

## 2019-03-20 RX ADMIN — ATROPA BELLADONNA AND OPIUM 1 SUPPOSITORY(S): 16.2; 6 SUPPOSITORY RECTAL at 15:30

## 2019-03-20 RX ADMIN — CARBIDOPA AND LEVODOPA 2 CAPSULE(S): 25; 100 TABLET ORAL at 19:10

## 2019-03-20 RX ADMIN — Medication 650 MILLIGRAM(S): at 14:00

## 2019-03-20 RX ADMIN — Medication 0.5 MILLIGRAM(S): at 16:57

## 2019-03-20 RX ADMIN — Medication 100 MILLIGRAM(S): at 13:17

## 2019-03-20 RX ADMIN — LUBIPROSTONE 8 MICROGRAM(S): 24 CAPSULE, GELATIN COATED ORAL at 05:12

## 2019-03-20 RX ADMIN — LIDOCAINE 1 APPLICATION(S): 4 CREAM TOPICAL at 13:17

## 2019-03-20 RX ADMIN — SIMVASTATIN 10 MILLIGRAM(S): 20 TABLET, FILM COATED ORAL at 22:57

## 2019-03-20 RX ADMIN — CARBIDOPA AND LEVODOPA 2 CAPSULE(S): 25; 100 TABLET ORAL at 05:12

## 2019-03-20 RX ADMIN — SODIUM CHLORIDE 75 MILLILITER(S): 9 INJECTION, SOLUTION INTRAVENOUS at 13:16

## 2019-03-20 RX ADMIN — ATROPA BELLADONNA AND OPIUM 1 SUPPOSITORY(S): 16.2; 6 SUPPOSITORY RECTAL at 15:02

## 2019-03-20 RX ADMIN — LUBIPROSTONE 8 MICROGRAM(S): 24 CAPSULE, GELATIN COATED ORAL at 19:10

## 2019-03-20 RX ADMIN — ATROPA BELLADONNA AND OPIUM 1 SUPPOSITORY(S): 16.2; 6 SUPPOSITORY RECTAL at 23:35

## 2019-03-20 RX ADMIN — ATROPA BELLADONNA AND OPIUM 1 SUPPOSITORY(S): 16.2; 6 SUPPOSITORY RECTAL at 22:57

## 2019-03-20 RX ADMIN — Medication 100 MILLIGRAM(S): at 19:10

## 2019-03-20 RX ADMIN — LIDOCAINE 1 APPLICATION(S): 4 CREAM TOPICAL at 05:11

## 2019-03-20 RX ADMIN — Medication 650 MILLIGRAM(S): at 23:35

## 2019-03-20 RX ADMIN — RASAGILINE 1 MILLIGRAM(S): 0.5 TABLET ORAL at 13:17

## 2019-03-20 RX ADMIN — SERTRALINE 100 MILLIGRAM(S): 25 TABLET, FILM COATED ORAL at 13:17

## 2019-03-20 NOTE — PROGRESS NOTE ADULT - ASSESSMENT
· Assessment		  78 year old male with history of parkinson's disease, prostate CA (s/p TURP x2 and radiation), HTN, HLD presenting to the ED from rehab with decreased urine output and suprapubic pain, admitted with OSMIN and concerns for UTI:      Problem Selector:  PROBLEM DIAGNOSES  Problem: OSMIN (acute kidney injury): pre-renal vs. a component of ATN  Assessment and Plan: -Cr eelvated to 3.04 with last documented Cr of 1.5 here, acute elevation. Per patient's wife (Fiance of 20 years), Cr was up to 2s in rehab when patient had urinary retention there.   -Continue núñez/CBI, continue to trend Cr  -renal u/s without hydronephrosis  -avoid nephrotoxic drugs   -pt was also using aleve as an outpt, avoid NSAIDs  -monitor urine output   - hold Lisinopril.  - renal eval appreciated   	  Problem: Acute UTI  Assessment and Plan: -pt was on Augmentin as an outpt   urine culture no growth  c/w ceftriaxone IV given symptoms and leukocytosis (7-10 days course should suffice)     Problem: Urinary retention  Assessment and Plan: -history of prostate CA with radiation and TURP   -continue with núñez/CBI  - urology f/u appreciated  -continue flomax     Problem: Parkinsons disease  Assessment and Plan: -continue with carbidopa/levodopa and azilect    -continue with xanax for anxiety     Problem: Hyperlipidemia  Assessment and Plan: -continue with simvastatin     Problem: Constipation  Assessment and Plan: -cotninue with senna and lubiprostone     Problem: Need for prophylactic measure  Assessment and Plan: -holding chemical DVT ppx due to ongoing bleeding.

## 2019-03-20 NOTE — PROGRESS NOTE ADULT - SUBJECTIVE AND OBJECTIVE BOX
[facility]   MD ALEAH COHEN MD RUORU WONG, PA    TEL:  OFFICE: [phone]  DR DIOR CELL: [phone]  CECILLE HUANG CELL: [phone]  DR. CHAIDEZ CELL: [phone]    RENAL FOLLOW UP NOTE  --------------------------------------------------------------------------------  HPI:      Pt seen and examined at bedside.   Kailyn MALDONADO, chest pain     PAST HISTORY  --------------------------------------------------------------------------------  No significant changes to PMH, PSH, FHx, SHx, unless otherwise noted    ALLERGIES & MEDICATIONS  --------------------------------------------------------------------------------  Allergies    No Known Allergies    Intolerances      Standing Inpatient Medications  acetaminophen  IVPB .. 1000 milliGRAM(s) IV Intermittent once  allopurinol 100 milliGRAM(s) Oral daily  amantadine 100 milliGRAM(s) Oral two times a day  carbidopa 36.25 mG/levodopa 145 mG ER 2 Capsule(s) Oral every 6 hours  cefTRIAXone   IVPB 1 Gram(s) IV Intermittent every 24 hours  cefTRIAXone   IVPB      famotidine    Tablet 20 milliGRAM(s) Oral daily  lactated ringers. 1000 milliLiter(s) IV Continuous <Continuous>  lidocaine 2% Gel 1 Application(s) Topical four times a day  lubiprostone 8 MICROGram(s) Oral two times a day  rasagiline Tablet 1 milliGRAM(s) Oral daily  sertraline 100 milliGRAM(s) Oral daily  simvastatin 10 milliGRAM(s) Oral at bedtime  tamsulosin 0.4 milliGRAM(s) Oral at bedtime    PRN Inpatient Medications  acetaminophen   Tablet .. 650 milliGRAM(s) Oral every 6 hours PRN  ALPRAZolam 0.5 milliGRAM(s) Oral two times a day PRN  belladonna 16.2 mG/opium 30 mg Suppository 1 Suppository(s) Rectal every 6 hours PRN  senna 2 Tablet(s) Oral at bedtime PRN      REVIEW OF SYSTEMS  --------------------------------------------------------------------------------  General: no fever  CVS: no chest pain  RESP: no sob, no cough  MSK: no edema     VITALS/PHYSICAL EXAM  --------------------------------------------------------------------------------  T(C): 36.6 (03-20-19 @ 09:40), Max: 36.8 (03-19-19 @ 18:02)  HR: 88 (03-20-19 @ 09:40) (83 - 103)  BP: 152/70 (03-20-19 @ 09:40) (93/57 - 152/70)  RR: 18 (03-20-19 @ 09:40) (16 - 18)  SpO2: 94% (03-20-19 @ 09:40) (93% - 97%)  Wt(kg): --        03-19-19 @ 07:01  -  03-20-19 @ 07:00  --------------------------------------------------------  IN: 2160 mL / OUT: 0 mL / NET: 2160 mL      Physical Exam:  	Gen: NAD  	HEENT: MMM  	Pulm: CTA B/L  	CV: S1S2  	Abd: Soft, +BS  	Ext: No LE edema B/L                      Neuro: Awake   	Skin: Warm and Dry   	BOB núñez    LABS/STUDIES  --------------------------------------------------------------------------------              8.7    10.9  >-----------<  399      [03-20-19 @ 07:16]              27.0     141  |  107  |  35  ----------------------------<  83      [03-20-19 @ 07:15]  4.7   |  22  |  1.97        Ca     9.0     [03-20-19 @ 07:15]            Creatinine Trend:  SCr 1.97 [03-20 @ 07:15]  SCr 2.16 [03-19 @ 16:42]  SCr 2.72 [03-19 @ 07:22]  SCr 3.10 [03-18 @ 07:21]  SCr 3.34 [03-17 @ 05:43]    Urinalysis - [03-16-19 @ 04:02]      Color Red / Appearance Turbid / SG 1.029 / pH See Note      Gluc See Note / Ketone See Note  / Bili See Note / Urobili See Note       Blood See Note / Protein See Note / Leuk Est See Note / Nitrite See Note      RBC >50 / WBC 15 / Hyaline  / Gran  / Sq Epi  / Non Sq Epi  / Bacteria

## 2019-03-20 NOTE — PROGRESS NOTE ADULT - SUBJECTIVE AND OBJECTIVE BOX
Urine color pinkish in color  No CP or SOB  NO fevers last night    Vital Signs Last 24 Hrs  T(C): 36.6 (20 Mar 2019 14:30), Max: 36.8 (19 Mar 2019 18:02)  T(F): 97.9 (20 Mar 2019 14:30), Max: 98.3 (19 Mar 2019 18:02)  HR: 71 (20 Mar 2019 14:30) (71 - 103)  BP: 107/68 (20 Mar 2019 14:30) (93/57 - 152/70)  BP(mean): --  RR: 16 (20 Mar 2019 14:30) (16 - 18)  SpO2: 99% (20 Mar 2019 14:30) (93% - 99%)    GENERAL: NAD, Comfortable, +parkinson features of masklike facies, mild tremor, rigidity  HEAD:  Atraumatic, Normocephalic  EYES: EOMI, PERRLA, conjunctiva and sclera clear  NECK: Supple, No JVD  CHEST/LUNG: Clear to auscultation bilaterally; No wheeze  HEART: Regular rate and rhythm; No murmurs, rubs, or gallops  ABDOMEN: Soft, Nontender, Nondistended; Bowel sounds present  Neuro: AAO x 2, slow speech, no focal deficit  : CBI/Arrieta in place, color much lighter now. pinkish. neg CVAT   EXTREMITIES:  2+ Peripheral Pulses, No clubbing, cyanosis, or edema, venous stasis changes  SKIN: No rashes or lesions    LABS:                        8.7    10.9  )-----------( 399      ( 20 Mar 2019 07:16 )             27.0     03-20    141  |  107  |  35<H>  ----------------------------<  83  4.7   |  22  |  1.97<H>    Ca    9.0      20 Mar 2019 07:15        CAPILLARY BLOOD GLUCOSE

## 2019-03-20 NOTE — PROGRESS NOTE ADULT - SUBJECTIVE AND OBJECTIVE BOX
Patient seen and examined,  Urine color continually improving.    T(F): 97.8, Max: 98.3 (03-19-19 @ 18:02)  HR: 88  BP: 152/70  SpO2: 94%    Gen NAD   Arrieta in place draining clear light pink urine on moderate drip    03-20 @ 07:16    WBC 10.9  / Hct 27.0  / SCr 1.97      .Urine Catheterized  03-16  No growth

## 2019-03-20 NOTE — PROGRESS NOTE ADULT - ASSESSMENT
78 year old male with history of parkinson's disease, prostate CA (s/p TURP x2 and radiation), HTN, HLD presenting to the ED with decreased urine output and suprapubic pain and hematuria. found to have OSMIN    OSMIN  peaked 3.34  Baseline Scr ? 1.5 (3/18)  OSMIN likely sec to  ATN   s/p núñez  Renal US negative for obstruction  continue to hold lisinopril   Urinary work up will not be helpful in view of bladder irrigation  renal function improving continue to monitor  avoid nephrotoxic agents.     Hyperkalemia  likely sec to RF  Serum K improved   low K diet  MOnitor serum K    Hematuria  f/u   On CBI

## 2019-03-20 NOTE — PROGRESS NOTE ADULT - ASSESSMENT
79 y/o male PMHx Prostate CA s/p XRT 2010 presenting with hematuria and retention found to have OSMIN or unknown etiology.     -Keep Arrieta in place  -Continue CBI, wean as tolerated  -RN to irrigate catheter if leaking or concern for clots  -Trend Cr, 3.34-> 3.10-> 2.7-> 2.1-> 1.97  -UCx negative  -No hydro, no evidence of obstruction on renal U/s  -Will continue to follow

## 2019-03-21 LAB
ANION GAP SERPL CALC-SCNC: 12 MMOL/L — SIGNIFICANT CHANGE UP (ref 5–17)
BUN SERPL-MCNC: 23 MG/DL — SIGNIFICANT CHANGE UP (ref 7–23)
CALCIUM SERPL-MCNC: 9.1 MG/DL — SIGNIFICANT CHANGE UP (ref 8.4–10.5)
CHLORIDE SERPL-SCNC: 109 MMOL/L — HIGH (ref 96–108)
CO2 SERPL-SCNC: 23 MMOL/L — SIGNIFICANT CHANGE UP (ref 22–31)
CREAT SERPL-MCNC: 1.55 MG/DL — HIGH (ref 0.5–1.3)
GLUCOSE SERPL-MCNC: 95 MG/DL — SIGNIFICANT CHANGE UP (ref 70–99)
HCT VFR BLD CALC: 24.4 % — LOW (ref 39–50)
HGB BLD-MCNC: 8.1 G/DL — LOW (ref 13–17)
MCHC RBC-ENTMCNC: 30.7 PG — SIGNIFICANT CHANGE UP (ref 27–34)
MCHC RBC-ENTMCNC: 33.3 GM/DL — SIGNIFICANT CHANGE UP (ref 32–36)
MCV RBC AUTO: 92.2 FL — SIGNIFICANT CHANGE UP (ref 80–100)
PLATELET # BLD AUTO: 451 K/UL — HIGH (ref 150–400)
POTASSIUM SERPL-MCNC: 4.6 MMOL/L — SIGNIFICANT CHANGE UP (ref 3.5–5.3)
POTASSIUM SERPL-SCNC: 4.6 MMOL/L — SIGNIFICANT CHANGE UP (ref 3.5–5.3)
RBC # BLD: 2.65 M/UL — LOW (ref 4.2–5.8)
RBC # FLD: 12.9 % — SIGNIFICANT CHANGE UP (ref 10.3–14.5)
SODIUM SERPL-SCNC: 144 MMOL/L — SIGNIFICANT CHANGE UP (ref 135–145)
WBC # BLD: 10.3 K/UL — SIGNIFICANT CHANGE UP (ref 3.8–10.5)
WBC # FLD AUTO: 10.3 K/UL — SIGNIFICANT CHANGE UP (ref 3.8–10.5)

## 2019-03-21 PROCEDURE — 99232 SBSQ HOSP IP/OBS MODERATE 35: CPT

## 2019-03-21 RX ORDER — ALPRAZOLAM 0.25 MG
0.5 TABLET ORAL
Qty: 0 | Refills: 0 | Status: DISCONTINUED | OUTPATIENT
Start: 2019-03-21 | End: 2019-03-26

## 2019-03-21 RX ADMIN — ATROPA BELLADONNA AND OPIUM 1 SUPPOSITORY(S): 16.2; 6 SUPPOSITORY RECTAL at 13:30

## 2019-03-21 RX ADMIN — LIDOCAINE 1 APPLICATION(S): 4 CREAM TOPICAL at 12:20

## 2019-03-21 RX ADMIN — LIDOCAINE 1 APPLICATION(S): 4 CREAM TOPICAL at 18:38

## 2019-03-21 RX ADMIN — LIDOCAINE 1 APPLICATION(S): 4 CREAM TOPICAL at 06:18

## 2019-03-21 RX ADMIN — CARBIDOPA AND LEVODOPA 2 CAPSULE(S): 25; 100 TABLET ORAL at 13:14

## 2019-03-21 RX ADMIN — CARBIDOPA AND LEVODOPA 2 CAPSULE(S): 25; 100 TABLET ORAL at 22:35

## 2019-03-21 RX ADMIN — ATROPA BELLADONNA AND OPIUM 1 SUPPOSITORY(S): 16.2; 6 SUPPOSITORY RECTAL at 12:15

## 2019-03-21 RX ADMIN — ATROPA BELLADONNA AND OPIUM 1 SUPPOSITORY(S): 16.2; 6 SUPPOSITORY RECTAL at 20:40

## 2019-03-21 RX ADMIN — CARBIDOPA AND LEVODOPA 2 CAPSULE(S): 25; 100 TABLET ORAL at 06:18

## 2019-03-21 RX ADMIN — Medication 650 MILLIGRAM(S): at 12:20

## 2019-03-21 RX ADMIN — CARBIDOPA AND LEVODOPA 2 CAPSULE(S): 25; 100 TABLET ORAL at 18:37

## 2019-03-21 RX ADMIN — ATROPA BELLADONNA AND OPIUM 1 SUPPOSITORY(S): 16.2; 6 SUPPOSITORY RECTAL at 20:11

## 2019-03-21 RX ADMIN — LIDOCAINE 1 APPLICATION(S): 4 CREAM TOPICAL at 22:35

## 2019-03-21 RX ADMIN — SERTRALINE 100 MILLIGRAM(S): 25 TABLET, FILM COATED ORAL at 13:14

## 2019-03-21 RX ADMIN — Medication 650 MILLIGRAM(S): at 20:40

## 2019-03-21 RX ADMIN — RASAGILINE 1 MILLIGRAM(S): 0.5 TABLET ORAL at 13:14

## 2019-03-21 RX ADMIN — Medication 650 MILLIGRAM(S): at 13:30

## 2019-03-21 RX ADMIN — Medication 650 MILLIGRAM(S): at 20:11

## 2019-03-21 RX ADMIN — LUBIPROSTONE 8 MICROGRAM(S): 24 CAPSULE, GELATIN COATED ORAL at 18:37

## 2019-03-21 RX ADMIN — FAMOTIDINE 20 MILLIGRAM(S): 10 INJECTION INTRAVENOUS at 13:14

## 2019-03-21 RX ADMIN — Medication 100 MILLIGRAM(S): at 18:37

## 2019-03-21 RX ADMIN — Medication 100 MILLIGRAM(S): at 06:18

## 2019-03-21 RX ADMIN — Medication 100 MILLIGRAM(S): at 13:14

## 2019-03-21 RX ADMIN — LUBIPROSTONE 8 MICROGRAM(S): 24 CAPSULE, GELATIN COATED ORAL at 06:18

## 2019-03-21 RX ADMIN — CEFTRIAXONE 100 GRAM(S): 500 INJECTION, POWDER, FOR SOLUTION INTRAMUSCULAR; INTRAVENOUS at 18:38

## 2019-03-21 RX ADMIN — SIMVASTATIN 10 MILLIGRAM(S): 20 TABLET, FILM COATED ORAL at 22:35

## 2019-03-21 RX ADMIN — TAMSULOSIN HYDROCHLORIDE 0.4 MILLIGRAM(S): 0.4 CAPSULE ORAL at 22:35

## 2019-03-21 RX ADMIN — Medication 0.5 MILLIGRAM(S): at 13:02

## 2019-03-21 NOTE — PHYSICAL THERAPY INITIAL EVALUATION ADULT - ADDITIONAL COMMENTS
Pt lives in a private house w/ his wife and 4 steps to neg to enter w/ 2 HR's. Pt amb w/ three wheeled walker PTA. Pt has a handicap accessible bathroom. Wife assisted with ADL's PTA.

## 2019-03-21 NOTE — PROGRESS NOTE ADULT - ASSESSMENT
78 year old male with history of parkinson's disease, prostate CA (s/p TURP x2 and radiation), HTN, HLD presenting to the ED from rehab with decreased urine output and suprapubic pain, admitted with OSMIN and concerns for UTI:      Problem Selector:  PROBLEM DIAGNOSES  Problem: OSMIN (acute kidney injury): pre-renal vs. a component of ATN  Assessment and Plan: -Cr elevated to 3.04 with last documented Cr of 1.5 here, acute elevation. Per patient's wife (Fiance of 20 years), Cr was up to 2s in rehab when patient had urinary retention there.   - f/u appreciated  -renal u/s without hydronephrosis  -avoid nephrotoxic drugs   -pt was also using aleve as an outpt, avoid NSAIDs  -monitor urine output   - hold Lisinopril.  - renal eval appreciated   	  Problem: Acute UTI  Assessment and Plan: -pt was on Augmentin as an outpt   urine culture no growth  c/w ceftriaxone IV given symptoms and leukocytosis (last day is 3/22)    Problem: Urinary retention  Assessment and Plan: -history of prostate CA with radiation and TURP   -continue with núñez ; CBI clamped  -urology f/u appreciated  -continue flomax     Problem: Parkinsons disease  Assessment and Plan: -continue with carbidopa/levodopa and azilect    -would stop xanax given his confusion    Problem: Hyperlipidemia  Assessment and Plan: -continue with simvastatin     Problem: Constipation  Assessment and Plan: -cotninue with senna and lubiprostone     Problem: Need for prophylactic measure  Assessment and Plan: -holding chemical DVT ppx due to ongoing bleeding.

## 2019-03-21 NOTE — PROGRESS NOTE ADULT - SUBJECTIVE AND OBJECTIVE BOX
[facility]   MD ALEAH COHEN MD RUORU WONG, PA    TEL:  OFFICE: [phone]  DR DIOR CELL: [phone]  CECILLE HUANG CELL: [phone]  DR. CHAIDEZ CELL: [phone]    RENAL FOLLOW UP NOTE  --------------------------------------------------------------------------------  HPI:      Pt seen and examined at bedside.       PAST HISTORY  --------------------------------------------------------------------------------  No significant changes to PMH, PSH, FHx, SHx, unless otherwise noted    ALLERGIES & MEDICATIONS  --------------------------------------------------------------------------------  Allergies    No Known Allergies    Intolerances      Standing Inpatient Medications  acetaminophen  IVPB .. 1000 milliGRAM(s) IV Intermittent once  allopurinol 100 milliGRAM(s) Oral daily  amantadine 100 milliGRAM(s) Oral two times a day  carbidopa 36.25 mG/levodopa 145 mG ER 2 Capsule(s) Oral every 6 hours  cefTRIAXone   IVPB 1 Gram(s) IV Intermittent every 24 hours  cefTRIAXone   IVPB      famotidine    Tablet 20 milliGRAM(s) Oral daily  lactated ringers. 1000 milliLiter(s) IV Continuous <Continuous>  lidocaine 2% Gel 1 Application(s) Topical four times a day  lubiprostone 8 MICROGram(s) Oral two times a day  rasagiline Tablet 1 milliGRAM(s) Oral daily  sertraline 100 milliGRAM(s) Oral daily  simvastatin 10 milliGRAM(s) Oral at bedtime  tamsulosin 0.4 milliGRAM(s) Oral at bedtime    PRN Inpatient Medications  acetaminophen   Tablet .. 650 milliGRAM(s) Oral every 6 hours PRN  ALPRAZolam 0.5 milliGRAM(s) Oral two times a day PRN  belladonna 16.2 mG/opium 30 mg Suppository 1 Suppository(s) Rectal every 6 hours PRN  senna 2 Tablet(s) Oral at bedtime PRN      REVIEW OF SYSTEMS  --------------------------------------------------------------------------------  General: no fever  CVS: no chest pain  MSK: no edema     VITALS/PHYSICAL EXAM  --------------------------------------------------------------------------------  T(C): 36.4 (03-21-19 @ 05:30), Max: 36.6 (03-20-19 @ 14:30)  HR: 82 (03-21-19 @ 05:30) (70 - 82)  BP: 137/70 (03-21-19 @ 05:30) (103/67 - 137/70)  RR: 18 (03-21-19 @ 05:30) (16 - 18)  SpO2: 96% (03-21-19 @ 05:30) (93% - 99%)  Wt(kg): --        03-20-19 @ 07:01  -  03-21-19 @ 07:00  --------------------------------------------------------  IN: 3240 mL / OUT: 0 mL / NET: 3240 mL      Physical Exam:  	Gen: NAD  	HEENT: MMM  	Pulm: CTA B/L  	CV: S1S2  	Abd: Soft, +BS  	Ext: No LE edema B/L                      Neuro: Awake   	Skin: Warm and Dry   	BOB núñez    LABS/STUDIES  --------------------------------------------------------------------------------              8.1    10.3  >-----------<  451      [03-21-19 @ 08:57]              24.4     144  |  109  |  23  ----------------------------<  95      [03-21-19 @ 08:57]  4.6   |  23  |  1.55        Ca     9.1     [03-21-19 @ 08:57]            Creatinine Trend:  SCr 1.55 [03-21 @ 08:57]  SCr 1.97 [03-20 @ 07:15]  SCr 2.16 [03-19 @ 16:42]  SCr 2.72 [03-19 @ 07:22]  SCr 3.10 [03-18 @ 07:21]    Urinalysis - [03-16-19 @ 04:02]      Color Red / Appearance Turbid / SG 1.029 / pH See Note      Gluc See Note / Ketone See Note  / Bili See Note / Urobili See Note       Blood See Note / Protein See Note / Leuk Est See Note / Nitrite See Note      RBC >50 / WBC 15 / Hyaline  / Gran  / Sq Epi  / Non Sq Epi  / Bacteria

## 2019-03-21 NOTE — PROGRESS NOTE ADULT - SUBJECTIVE AND OBJECTIVE BOX
CBI clamped this AM, but restarted as urine became redder.  Trending Hct, 24.4 this AM.    T(F): 97.8, Max: 97.9 (03-20-19 @ 14:30)  HR: 78  BP: 124/64  SpO2: 97%    On CBI    Gen NAD   on CBI    03-21 @ 08:57  WBC 10.3  / Hct 24.4  / SCr 1.55     03-20 @ 07:16  WBC 10.9  / Hct 27.0  / SCr --

## 2019-03-21 NOTE — PHYSICAL THERAPY INITIAL EVALUATION ADULT - CRITERIA FOR SKILLED THERAPEUTIC INTERVENTIONS
risk reduction/prevention/predicted duration of therapy intervention/therapy frequency/anticipated equipment needs at discharge/anticipated discharge recommendation/impairments found/functional limitations in following categories

## 2019-03-21 NOTE — PROGRESS NOTE ADULT - SUBJECTIVE AND OBJECTIVE BOX
Confused this morning  CBI clamped, urine is moderately blood-tinged    Vital Signs Last 24 Hrs  T(C): 36.6 (21 Mar 2019 10:29), Max: 36.6 (20 Mar 2019 14:30)  T(F): 97.8 (21 Mar 2019 10:29), Max: 97.9 (20 Mar 2019 14:30)  HR: 78 (21 Mar 2019 11:44) (70 - 82)  BP: 124/64 (21 Mar 2019 11:44) (103/67 - 137/70)  BP(mean): --  RR: 17 (21 Mar 2019 10:29) (16 - 18)  SpO2: 97% (21 Mar 2019 11:44) (93% - 99%)    GENERAL: NAD, confused, +parkinson features of masklike facies, mild tremor, rigidity  HEAD:  Atraumatic, Normocephalic  EYES: EOMI, PERRLA, conjunctiva and sclera clear  NECK: Supple, No JVD  CHEST/LUNG: Clear to auscultation bilaterally; No wheeze  HEART: Regular rate and rhythm; No murmurs, rubs, or gallops  ABDOMEN: Soft, Nontender, Nondistended; Bowel sounds present  Neuro: AAO x 2, slow speech, no focal deficits  : CBI/Arrieta in place, color much lighter now. pinkish. neg CVAT   EXTREMITIES:  2+ Peripheral Pulses, No clubbing, cyanosis, or edema, venous stasis changes  SKIN: inguinal folds with fungal dermatitis    LABS:                        8.1    10.3  )-----------( 451      ( 21 Mar 2019 08:57 )             24.4     03-21    144  |  109<H>  |  23  ----------------------------<  95  4.6   |  23  |  1.55<H>    Ca    9.1      21 Mar 2019 08:57        CAPILLARY BLOOD GLUCOSE

## 2019-03-21 NOTE — PHYSICAL THERAPY INITIAL EVALUATION ADULT - PERTINENT HX OF CURRENT PROBLEM, REHAB EVAL
78 year old male with history of parkinson's disease, prostate CA (s/p TURP x2 and radiation), HTN, HLD presenting to the ED from rehab with decreased urine output and suprapubic pain, admitted with OSMIN and concerns for UTI.

## 2019-03-21 NOTE — PROGRESS NOTE ADULT - ASSESSMENT
77 y/o male PMHx Prostate CA s/p XRT 2010 presenting with hematuria and retention found to have OSMIN or unknown etiology. UCx negative  No hydro, no evidence of obstruction on renal U/s    -Keep Arrieta in place  -Continue CBI, wean as tolerated  -RN to irrigate catheter if leaking or concern for clots  -Creatinine improving  -Trend Hct, transfuse PRN  -Will continue to follow

## 2019-03-22 ENCOUNTER — TRANSCRIPTION ENCOUNTER (OUTPATIENT)
Age: 79
End: 2019-03-22

## 2019-03-22 LAB
ANION GAP SERPL CALC-SCNC: 11 MMOL/L — SIGNIFICANT CHANGE UP (ref 5–17)
BUN SERPL-MCNC: 18 MG/DL — SIGNIFICANT CHANGE UP (ref 7–23)
CALCIUM SERPL-MCNC: 8.8 MG/DL — SIGNIFICANT CHANGE UP (ref 8.4–10.5)
CHLORIDE SERPL-SCNC: 110 MMOL/L — HIGH (ref 96–108)
CO2 SERPL-SCNC: 24 MMOL/L — SIGNIFICANT CHANGE UP (ref 22–31)
CREAT SERPL-MCNC: 1.44 MG/DL — HIGH (ref 0.5–1.3)
GLUCOSE SERPL-MCNC: 96 MG/DL — SIGNIFICANT CHANGE UP (ref 70–99)
HCT VFR BLD CALC: 24.3 % — LOW (ref 39–50)
HGB BLD-MCNC: 7.7 G/DL — LOW (ref 13–17)
MCHC RBC-ENTMCNC: 29.5 PG — SIGNIFICANT CHANGE UP (ref 27–34)
MCHC RBC-ENTMCNC: 31.7 GM/DL — LOW (ref 32–36)
MCV RBC AUTO: 93.1 FL — SIGNIFICANT CHANGE UP (ref 80–100)
PLATELET # BLD AUTO: 421 K/UL — HIGH (ref 150–400)
POTASSIUM SERPL-MCNC: 4.5 MMOL/L — SIGNIFICANT CHANGE UP (ref 3.5–5.3)
POTASSIUM SERPL-SCNC: 4.5 MMOL/L — SIGNIFICANT CHANGE UP (ref 3.5–5.3)
RBC # BLD: 2.61 M/UL — LOW (ref 4.2–5.8)
RBC # FLD: 12.9 % — SIGNIFICANT CHANGE UP (ref 10.3–14.5)
SODIUM SERPL-SCNC: 145 MMOL/L — SIGNIFICANT CHANGE UP (ref 135–145)
WBC # BLD: 9.4 K/UL — SIGNIFICANT CHANGE UP (ref 3.8–10.5)
WBC # FLD AUTO: 9.4 K/UL — SIGNIFICANT CHANGE UP (ref 3.8–10.5)

## 2019-03-22 PROCEDURE — 99232 SBSQ HOSP IP/OBS MODERATE 35: CPT

## 2019-03-22 RX ADMIN — LIDOCAINE 1 APPLICATION(S): 4 CREAM TOPICAL at 05:11

## 2019-03-22 RX ADMIN — LUBIPROSTONE 8 MICROGRAM(S): 24 CAPSULE, GELATIN COATED ORAL at 17:59

## 2019-03-22 RX ADMIN — CARBIDOPA AND LEVODOPA 2 CAPSULE(S): 25; 100 TABLET ORAL at 11:58

## 2019-03-22 RX ADMIN — Medication 650 MILLIGRAM(S): at 11:59

## 2019-03-22 RX ADMIN — RASAGILINE 1 MILLIGRAM(S): 0.5 TABLET ORAL at 12:03

## 2019-03-22 RX ADMIN — ATROPA BELLADONNA AND OPIUM 1 SUPPOSITORY(S): 16.2; 6 SUPPOSITORY RECTAL at 04:45

## 2019-03-22 RX ADMIN — Medication 650 MILLIGRAM(S): at 04:45

## 2019-03-22 RX ADMIN — Medication 100 MILLIGRAM(S): at 17:59

## 2019-03-22 RX ADMIN — ATROPA BELLADONNA AND OPIUM 1 SUPPOSITORY(S): 16.2; 6 SUPPOSITORY RECTAL at 22:27

## 2019-03-22 RX ADMIN — Medication 650 MILLIGRAM(S): at 22:30

## 2019-03-22 RX ADMIN — ATROPA BELLADONNA AND OPIUM 1 SUPPOSITORY(S): 16.2; 6 SUPPOSITORY RECTAL at 12:50

## 2019-03-22 RX ADMIN — Medication 100 MILLIGRAM(S): at 05:11

## 2019-03-22 RX ADMIN — CARBIDOPA AND LEVODOPA 2 CAPSULE(S): 25; 100 TABLET ORAL at 05:11

## 2019-03-22 RX ADMIN — LIDOCAINE 1 APPLICATION(S): 4 CREAM TOPICAL at 12:02

## 2019-03-22 RX ADMIN — ATROPA BELLADONNA AND OPIUM 1 SUPPOSITORY(S): 16.2; 6 SUPPOSITORY RECTAL at 21:57

## 2019-03-22 RX ADMIN — Medication 650 MILLIGRAM(S): at 12:49

## 2019-03-22 RX ADMIN — ATROPA BELLADONNA AND OPIUM 1 SUPPOSITORY(S): 16.2; 6 SUPPOSITORY RECTAL at 04:04

## 2019-03-22 RX ADMIN — ATROPA BELLADONNA AND OPIUM 1 SUPPOSITORY(S): 16.2; 6 SUPPOSITORY RECTAL at 12:01

## 2019-03-22 RX ADMIN — CARBIDOPA AND LEVODOPA 2 CAPSULE(S): 25; 100 TABLET ORAL at 17:59

## 2019-03-22 RX ADMIN — Medication 650 MILLIGRAM(S): at 21:58

## 2019-03-22 RX ADMIN — Medication 650 MILLIGRAM(S): at 04:04

## 2019-03-22 RX ADMIN — SERTRALINE 100 MILLIGRAM(S): 25 TABLET, FILM COATED ORAL at 11:59

## 2019-03-22 RX ADMIN — CEFTRIAXONE 100 GRAM(S): 500 INJECTION, POWDER, FOR SOLUTION INTRAMUSCULAR; INTRAVENOUS at 17:56

## 2019-03-22 RX ADMIN — LIDOCAINE 1 APPLICATION(S): 4 CREAM TOPICAL at 17:59

## 2019-03-22 RX ADMIN — SIMVASTATIN 10 MILLIGRAM(S): 20 TABLET, FILM COATED ORAL at 21:57

## 2019-03-22 RX ADMIN — Medication 0.5 MILLIGRAM(S): at 12:01

## 2019-03-22 RX ADMIN — Medication 100 MILLIGRAM(S): at 11:59

## 2019-03-22 RX ADMIN — LUBIPROSTONE 8 MICROGRAM(S): 24 CAPSULE, GELATIN COATED ORAL at 05:11

## 2019-03-22 RX ADMIN — TAMSULOSIN HYDROCHLORIDE 0.4 MILLIGRAM(S): 0.4 CAPSULE ORAL at 21:57

## 2019-03-22 RX ADMIN — FAMOTIDINE 20 MILLIGRAM(S): 10 INJECTION INTRAVENOUS at 11:58

## 2019-03-22 NOTE — PROGRESS NOTE ADULT - SUBJECTIVE AND OBJECTIVE BOX
Restarted on slow CBI after hematuria recurred after he got up yesterday    Vital Signs Last 24 Hrs  T(C): 36.5 (22 Mar 2019 13:25), Max: 36.6 (21 Mar 2019 21:14)  T(F): 97.7 (22 Mar 2019 13:25), Max: 97.9 (22 Mar 2019 05:50)  HR: 65 (22 Mar 2019 13:25) (65 - 83)  BP: 119/68 (22 Mar 2019 13:25) (110/68 - 154/91)  BP(mean): --  RR: 18 (22 Mar 2019 13:25) (18 - 18)  SpO2: 95% (22 Mar 2019 13:25) (93% - 98%)    GENERAL: NAD, confused, +parkinson features of masklike facies, mild tremor, rigidity  HEAD:  Atraumatic, Normocephalic  EYES: EOMI, PERRLA, conjunctiva and sclera clear  NECK: Supple, No JVD  CHEST/LUNG: Clear to auscultation bilaterally; No wheeze  HEART: Regular rate and rhythm; No murmurs, rubs, or gallops  ABDOMEN: Soft, Nontender, Nondistended; Bowel sounds present  Neuro: AAO x 2, slow speech, no focal deficits  : CBI/Arrieta in place, color much lighter now. pinkish. neg CVAT   EXTREMITIES:  2+ Peripheral Pulses, No clubbing, cyanosis, or edema, venous stasis changes  SKIN: inguinal folds with fungal dermatitis    LABS:                        7.7    9.4   )-----------( 421      ( 22 Mar 2019 09:13 )             24.3     03-22    145  |  110<H>  |  18  ----------------------------<  96  4.5   |  24  |  1.44<H>    Ca    8.8      22 Mar 2019 06:51        CAPILLARY BLOOD GLUCOSE

## 2019-03-22 NOTE — DISCHARGE NOTE NURSING/CASE MANAGEMENT/SOCIAL WORK - [email or web address]_GEN_ALL_CORE
You can access the [facility] Patient Portal, offered by [facility], by registering with the following website: [email or web address]

## 2019-03-22 NOTE — DISCHARGE NOTE PROVIDER - HOSPITAL COURSE
78 year old male with history of parkinson's disease, prostate CA (s/p TURP x2 and radiation), HTN, HLD presenting to the ED from rehab with decreased urine output and suprapubic pain,              hematuria and retention found to have OSMIN or unknown etiology. UCx negative.  No hydro, no evidence of obstruction on renal U/s         Urinary retention with hematuria        -Failed TOV, Arrieta replaced    -Home with Arrieta, outpatient TOV    -Should follow up in the office with Dr. Calle in 1 week    -[facility] [phone]         OSMIN    peaked 3.34    Baseline Scr ? 1.5 (3/18)    OSMIN likely sec to  ATN     Arrieta reinserted sc to retention    Renal US negative for obstruction    Ok to start lisinopril on discharge    renal function improving continue to monitor    avoid nephrotoxic agents.          Acute UTI    Assessment and Plan: -pt was on Augmentin as an outpt     urine culture no growth    finished ceftriaxone on 3/22

## 2019-03-22 NOTE — PROGRESS NOTE ADULT - SUBJECTIVE AND OBJECTIVE BOX
[facility] [place] [facility]   MD ALEAH COHEN MD RUORU WONG, PA    TEL:  OFFICE: [phone]  DR DIOR CELL: [phone]  CECILLE HUANG CELL: [phone]  DR. CHAIDEZ CELL: [phone]    RENAL FOLLOW UP NOTE  --------------------------------------------------------------------------------  HPI:      Pt seen and examined at bedside.   Kailyn MALDONADO, chest pain     PAST HISTORY  --------------------------------------------------------------------------------  No significant changes to PMH, PSH, FHx, SHx, unless otherwise noted    ALLERGIES & MEDICATIONS  --------------------------------------------------------------------------------  Allergies    No Known Allergies    Intolerances      Standing Inpatient Medications  acetaminophen  IVPB .. 1000 milliGRAM(s) IV Intermittent once  allopurinol 100 milliGRAM(s) Oral daily  amantadine 100 milliGRAM(s) Oral two times a day  carbidopa 36.25 mG/levodopa 145 mG ER 2 Capsule(s) Oral every 6 hours  cefTRIAXone   IVPB 1 Gram(s) IV Intermittent every 24 hours  cefTRIAXone   IVPB      famotidine    Tablet 20 milliGRAM(s) Oral daily  lactated ringers. 1000 milliLiter(s) IV Continuous <Continuous>  lidocaine 2% Gel 1 Application(s) Topical four times a day  lubiprostone 8 MICROGram(s) Oral two times a day  rasagiline Tablet 1 milliGRAM(s) Oral daily  sertraline 100 milliGRAM(s) Oral daily  simvastatin 10 milliGRAM(s) Oral at bedtime  tamsulosin 0.4 milliGRAM(s) Oral at bedtime    PRN Inpatient Medications  acetaminophen   Tablet .. 650 milliGRAM(s) Oral every 6 hours PRN  ALPRAZolam 0.5 milliGRAM(s) Oral two times a day PRN  belladonna 16.2 mG/opium 30 mg Suppository 1 Suppository(s) Rectal every 6 hours PRN  senna 2 Tablet(s) Oral at bedtime PRN      REVIEW OF SYSTEMS  --------------------------------------------------------------------------------  General: no fever  CVS: no chest pain  RESP: no sob, no cough    VITALS/PHYSICAL EXAM  --------------------------------------------------------------------------------  T(C): 36.4 (03-22-19 @ 09:17), Max: 36.6 (03-21-19 @ 21:14)  HR: 79 (03-22-19 @ 09:17) (65 - 83)  BP: 129/77 (03-22-19 @ 09:17) (110/68 - 154/91)  RR: 18 (03-22-19 @ 09:17) (18 - 18)  SpO2: 94% (03-22-19 @ 09:17) (93% - 98%)  Wt(kg): --        03-21-19 @ 07:01  -  03-22-19 @ 07:00  --------------------------------------------------------  IN: 3480 mL / OUT: 250 mL / NET: 3230 mL    03-22-19 @ 07:01  -  03-22-19 @ 10:35  --------------------------------------------------------  IN: 160 mL / OUT: 0 mL / NET: 160 mL      Physical Exam:  	Gen: NAD  	HEENT: MMM  	Pulm: CTA B/L  	CV: S1S2  	Abd: Soft, +BS  	Ext: No LE edema B/L                      Neuro: Awake   	Skin: Warm and Dry   	 no wilton    LABS/STUDIES  --------------------------------------------------------------------------------              8.1    10.3  >-----------<  451      [03-21-19 @ 08:57]              24.4     145  |  110  |  18  ----------------------------<  96      [03-22-19 @ 06:51]  4.5   |  24  |  1.44        Ca     8.8     [03-22-19 @ 06:51]            Creatinine Trend:  SCr 1.44 [03-22 @ 06:51]  SCr 1.55 [03-21 @ 08:57]  SCr 1.97 [03-20 @ 07:15]  SCr 2.16 [03-19 @ 16:42]  SCr 2.72 [03-19 @ 07:22]    Urinalysis - [03-16-19 @ 04:02]      Color Red / Appearance Turbid / SG 1.029 / pH See Note      Gluc See Note / Ketone See Note  / Bili See Note / Urobili See Note       Blood See Note / Protein See Note / Leuk Est See Note / Nitrite See Note      RBC >50 / WBC 15 / Hyaline  / Gran  / Sq Epi  / Non Sq Epi  / Bacteria [facility] [place] [facility]   MD ALEAH COHEN MD RUORU WONG, PA    TEL:  OFFICE: [phone]  DR DIOR CELL: [phone]  CECILLE HUANG CELL: [phone]  DR. CHAIDEZ CELL: [phone]    RENAL FOLLOW UP NOTE  --------------------------------------------------------------------------------  HPI:      Pt seen and examined at bedside.   Kailyn MALDONADO, chest pain     PAST HISTORY  --------------------------------------------------------------------------------  No significant changes to PMH, PSH, FHx, SHx, unless otherwise noted    ALLERGIES & MEDICATIONS  --------------------------------------------------------------------------------  Allergies    No Known Allergies    Intolerances      Standing Inpatient Medications  acetaminophen  IVPB .. 1000 milliGRAM(s) IV Intermittent once  allopurinol 100 milliGRAM(s) Oral daily  amantadine 100 milliGRAM(s) Oral two times a day  carbidopa 36.25 mG/levodopa 145 mG ER 2 Capsule(s) Oral every 6 hours  cefTRIAXone   IVPB 1 Gram(s) IV Intermittent every 24 hours  cefTRIAXone   IVPB      famotidine    Tablet 20 milliGRAM(s) Oral daily  lactated ringers. 1000 milliLiter(s) IV Continuous <Continuous>  lidocaine 2% Gel 1 Application(s) Topical four times a day  lubiprostone 8 MICROGram(s) Oral two times a day  rasagiline Tablet 1 milliGRAM(s) Oral daily  sertraline 100 milliGRAM(s) Oral daily  simvastatin 10 milliGRAM(s) Oral at bedtime  tamsulosin 0.4 milliGRAM(s) Oral at bedtime    PRN Inpatient Medications  acetaminophen   Tablet .. 650 milliGRAM(s) Oral every 6 hours PRN  ALPRAZolam 0.5 milliGRAM(s) Oral two times a day PRN  belladonna 16.2 mG/opium 30 mg Suppository 1 Suppository(s) Rectal every 6 hours PRN  senna 2 Tablet(s) Oral at bedtime PRN      REVIEW OF SYSTEMS  --------------------------------------------------------------------------------  General: no fever  CVS: no chest pain  RESP: no sob, no cough    VITALS/PHYSICAL EXAM  --------------------------------------------------------------------------------  T(C): 36.4 (03-22-19 @ 09:17), Max: 36.6 (03-21-19 @ 21:14)  HR: 79 (03-22-19 @ 09:17) (65 - 83)  BP: 129/77 (03-22-19 @ 09:17) (110/68 - 154/91)  RR: 18 (03-22-19 @ 09:17) (18 - 18)  SpO2: 94% (03-22-19 @ 09:17) (93% - 98%)  Wt(kg): --        03-21-19 @ 07:01  -  03-22-19 @ 07:00  --------------------------------------------------------  IN: 3480 mL / OUT: 250 mL / NET: 3230 mL    03-22-19 @ 07:01  -  03-22-19 @ 10:35  --------------------------------------------------------  IN: 160 mL / OUT: 0 mL / NET: 160 mL      Physical Exam:  	Gen: NAD  	HEENT: MMM  	Pulm: CTA B/L  	CV: S1S2  	Abd: Soft, +BS  	Ext: No LE edema B/L                      Neuro: Awake   	Skin: Warm and Dry   	 + wilton    LABS/STUDIES  --------------------------------------------------------------------------------              8.1    10.3  >-----------<  451      [03-21-19 @ 08:57]              24.4     145  |  110  |  18  ----------------------------<  96      [03-22-19 @ 06:51]  4.5   |  24  |  1.44        Ca     8.8     [03-22-19 @ 06:51]            Creatinine Trend:  SCr 1.44 [03-22 @ 06:51]  SCr 1.55 [03-21 @ 08:57]  SCr 1.97 [03-20 @ 07:15]  SCr 2.16 [03-19 @ 16:42]  SCr 2.72 [03-19 @ 07:22]    Urinalysis - [03-16-19 @ 04:02]      Color Red / Appearance Turbid / SG 1.029 / pH See Note      Gluc See Note / Ketone See Note  / Bili See Note / Urobili See Note       Blood See Note / Protein See Note / Leuk Est See Note / Nitrite See Note      RBC >50 / WBC 15 / Hyaline  / Gran  / Sq Epi  / Non Sq Epi  / Bacteria

## 2019-03-22 NOTE — PROGRESS NOTE ADULT - ASSESSMENT
79 y/o male PMHx Prostate CA s/p XRT 2010 presenting with hematuria and retention found to have OSMIN or unknown etiology. UCx negative  No hydro, no evidence of obstruction on renal U/s    -Keep Arrieta in place  -Continue CBI, wean as tolerated, continues to improve  -RN to irrigate catheter if leaking or concern for clots  -Creatinine improving significantly  -Trend Hct, transfuse PRN (24.4 yesterday)  -Will continue to follow 77 y/o male PMHx Prostate CA s/p XRT 2010 presenting with hematuria and retention found to have OSMIN or unknown etiology. UCx negative.  No hydro, no evidence of obstruction on renal U/s    -Would not hold chemical DVT PPX due to  bleeding, benefit of DVT PPX outweighs minimal risk of worsened hematuria  -Keep Arrieta in place  -Continue CBI, wean as tolerated, continues to improve  -RN to irrigate catheter if leaking or concern for clots  -Creatinine improving significantly  -Trend Hct, transfuse PRN (24.4 yesterday)  -Will continue to follow

## 2019-03-22 NOTE — PROGRESS NOTE ADULT - ATTENDING COMMENTS
- Dr. JESUS Frankel ([facility])  - [phone]
Hima Valdez will be covering for me starting 3/20/19. He can be reached at  if needed.     - Dr. JESUS Frankel ([facility])  - [phone]
Patient seen and examined.  Agree with above evaluation and management plan.
Patient seen and examined.  Agree with above.  Urine clear.  Plan for voiding trial in a day or two if urine remains clear.
Patient seen and examined.  Agree with above.  Void trial once urine clear off CBI.
Patient seen and examined. Agree with assessment and plan.  CBI
Patient seen and examined.  Agree with above.
Patient seen and examined.  Urine bloody again after PT.  CBI clamped early this morning, but bloodier now as he tried to get out of bed.  CBI restarted and titrated to very slow drip.  Monitor color and wean CBI.

## 2019-03-22 NOTE — DISCHARGE NOTE PROVIDER - NSDCCPCAREPLAN_GEN_ALL_CORE_FT
PRINCIPAL DISCHARGE DIAGNOSIS  Diagnosis: OSMIN (acute kidney injury)  Assessment and Plan of Treatment: -avoid nephrotoxic drugs   -pt was also using aleve as an outpt, avoid NSAIDs  -monitor urine output   -still holding Lisinopril but will restart upon d/c      SECONDARY DISCHARGE DIAGNOSES  Diagnosis: Urinary retention  Assessment and Plan of Treatment: -Failed TOV, Arrieta replaced  -Home with Arrieta, outpatient TOV  -Should follow up in the office with Dr. Calle in 1 w    Diagnosis: Acute UTI  Assessment and Plan of Treatment: -pt was on Augmentin as an outpt   urine culture no growth  finished ceftriaxone on 3/22    Diagnosis: Parkinsons disease  Assessment and Plan of Treatment: continue with carbidopa/levodopa and azilect    -wife insists on continuing xanax

## 2019-03-22 NOTE — DISCHARGE NOTE PROVIDER - CARE PROVIDER_API CALL
Bhupinder Calle)  Urology  [address] [place] [address]  Phone: [phone]  Fax: [phone]  Follow Up Time:

## 2019-03-22 NOTE — DISCHARGE NOTE NURSING/CASE MANAGEMENT/SOCIAL WORK - NSDCPNINST_GEN_ALL_CORE
If unable to tolerate diet, nausea, vomiting, no urine output from núñez catheter, fever above 100.4 F, chills, or an increase in pain, notify provider or return to ER

## 2019-03-22 NOTE — PROGRESS NOTE ADULT - SUBJECTIVE AND OBJECTIVE BOX
Patient seen and examined.  Urine became more hematuric after PT yesterday, improved overnight.  CBI slowed to slow/moderate drip this morning, urine was crystal clear.    T(F): 97.9, Max: 97.9 (03-22-19 @ 05:50)  HR: 71  BP: 110/68  SpO2: 93%    On CBI    Gen NAD   Clear on slow drip    03-22 @ 06:51  WBC --    / Hct --    / SCr 1.44     03-21 @ 08:57  WBC 10.3  / Hct 24.4  / SCr 1.55

## 2019-03-22 NOTE — PROGRESS NOTE ADULT - ASSESSMENT
78 year old male with history of parkinson's disease, prostate CA (s/p TURP x2 and radiation), HTN, HLD presenting to the ED from rehab with decreased urine output and suprapubic pain, admitted with OSMIN and concerns for UTI:       Problem: OSMIN (acute kidney injury): pre-renal vs. a component of ATN  Assessment and Plan: -Cr elevated to 3.04 with last documented Cr of 1.5 here, acute elevation. Per patient's wife (Fiance of 20 years), Cr was up to 2s in rehab when patient had urinary retention there.   - f/u appreciated  -renal u/s without hydronephrosis  -avoid nephrotoxic drugs   -pt was also using aleve as an outpt, avoid NSAIDs  -monitor urine output   -still holding Lisinopril.  -renal appreciated   	  Problem: Acute UTI  Assessment and Plan: -pt was on Augmentin as an outpt   urine culture no growth  c/w ceftriaxone IV given symptoms and leukocytosis (last day is 3/22)    Problem: Urinary retention  Assessment and Plan: -history of prostate CA with radiation and TURP   -continue with núñez ; CBI slow rate restarted  -urology f/u appreciated  -continue flomax     Problem: Parkinsons disease  Assessment and Plan: -continue with carbidopa/levodopa and azilect    -wife insists on continuing xanax    Problem: Hyperlipidemia  Assessment and Plan: -continue with simvastatin     Problem: Constipation  Assessment and Plan: -cotninue with senna and lubiprostone     Problem: Need for prophylactic measure  Assessment and Plan: -holding chemical DVT ppx due to ongoing bleeding.

## 2019-03-23 LAB
ANION GAP SERPL CALC-SCNC: 10 MMOL/L — SIGNIFICANT CHANGE UP (ref 5–17)
BLD GP AB SCN SERPL QL: NEGATIVE — SIGNIFICANT CHANGE UP
BUN SERPL-MCNC: 15 MG/DL — SIGNIFICANT CHANGE UP (ref 7–23)
CALCIUM SERPL-MCNC: 8.8 MG/DL — SIGNIFICANT CHANGE UP (ref 8.4–10.5)
CHLORIDE SERPL-SCNC: 107 MMOL/L — SIGNIFICANT CHANGE UP (ref 96–108)
CO2 SERPL-SCNC: 24 MMOL/L — SIGNIFICANT CHANGE UP (ref 22–31)
CREAT SERPL-MCNC: 1.3 MG/DL — SIGNIFICANT CHANGE UP (ref 0.5–1.3)
GLUCOSE SERPL-MCNC: 81 MG/DL — SIGNIFICANT CHANGE UP (ref 70–99)
HCT VFR BLD CALC: 24.7 % — LOW (ref 39–50)
HGB BLD-MCNC: 7.9 G/DL — LOW (ref 13–17)
MCHC RBC-ENTMCNC: 30.6 PG — SIGNIFICANT CHANGE UP (ref 27–34)
MCHC RBC-ENTMCNC: 32.2 GM/DL — SIGNIFICANT CHANGE UP (ref 32–36)
MCV RBC AUTO: 95.2 FL — SIGNIFICANT CHANGE UP (ref 80–100)
PLATELET # BLD AUTO: 405 K/UL — HIGH (ref 150–400)
POTASSIUM SERPL-MCNC: 4.3 MMOL/L — SIGNIFICANT CHANGE UP (ref 3.5–5.3)
POTASSIUM SERPL-SCNC: 4.3 MMOL/L — SIGNIFICANT CHANGE UP (ref 3.5–5.3)
RBC # BLD: 2.59 M/UL — LOW (ref 4.2–5.8)
RBC # FLD: 13.3 % — SIGNIFICANT CHANGE UP (ref 10.3–14.5)
RH IG SCN BLD-IMP: POSITIVE — SIGNIFICANT CHANGE UP
SODIUM SERPL-SCNC: 141 MMOL/L — SIGNIFICANT CHANGE UP (ref 135–145)
WBC # BLD: 10 K/UL — SIGNIFICANT CHANGE UP (ref 3.8–10.5)
WBC # FLD AUTO: 10 K/UL — SIGNIFICANT CHANGE UP (ref 3.8–10.5)

## 2019-03-23 RX ADMIN — ATROPA BELLADONNA AND OPIUM 1 SUPPOSITORY(S): 16.2; 6 SUPPOSITORY RECTAL at 22:25

## 2019-03-23 RX ADMIN — Medication 650 MILLIGRAM(S): at 21:53

## 2019-03-23 RX ADMIN — FAMOTIDINE 20 MILLIGRAM(S): 10 INJECTION INTRAVENOUS at 12:29

## 2019-03-23 RX ADMIN — LUBIPROSTONE 8 MICROGRAM(S): 24 CAPSULE, GELATIN COATED ORAL at 06:16

## 2019-03-23 RX ADMIN — Medication 0.5 MILLIGRAM(S): at 00:12

## 2019-03-23 RX ADMIN — LUBIPROSTONE 8 MICROGRAM(S): 24 CAPSULE, GELATIN COATED ORAL at 17:44

## 2019-03-23 RX ADMIN — Medication 650 MILLIGRAM(S): at 12:29

## 2019-03-23 RX ADMIN — CARBIDOPA AND LEVODOPA 2 CAPSULE(S): 25; 100 TABLET ORAL at 17:44

## 2019-03-23 RX ADMIN — Medication 0.5 MILLIGRAM(S): at 17:44

## 2019-03-23 RX ADMIN — LIDOCAINE 1 APPLICATION(S): 4 CREAM TOPICAL at 00:12

## 2019-03-23 RX ADMIN — TAMSULOSIN HYDROCHLORIDE 0.4 MILLIGRAM(S): 0.4 CAPSULE ORAL at 21:53

## 2019-03-23 RX ADMIN — SIMVASTATIN 10 MILLIGRAM(S): 20 TABLET, FILM COATED ORAL at 21:53

## 2019-03-23 RX ADMIN — CARBIDOPA AND LEVODOPA 2 CAPSULE(S): 25; 100 TABLET ORAL at 12:29

## 2019-03-23 RX ADMIN — SERTRALINE 100 MILLIGRAM(S): 25 TABLET, FILM COATED ORAL at 12:29

## 2019-03-23 RX ADMIN — CEFTRIAXONE 100 GRAM(S): 500 INJECTION, POWDER, FOR SOLUTION INTRAMUSCULAR; INTRAVENOUS at 17:44

## 2019-03-23 RX ADMIN — CARBIDOPA AND LEVODOPA 2 CAPSULE(S): 25; 100 TABLET ORAL at 00:12

## 2019-03-23 RX ADMIN — CARBIDOPA AND LEVODOPA 2 CAPSULE(S): 25; 100 TABLET ORAL at 06:15

## 2019-03-23 RX ADMIN — LIDOCAINE 1 APPLICATION(S): 4 CREAM TOPICAL at 06:16

## 2019-03-23 RX ADMIN — LIDOCAINE 1 APPLICATION(S): 4 CREAM TOPICAL at 12:32

## 2019-03-23 RX ADMIN — Medication 100 MILLIGRAM(S): at 17:44

## 2019-03-23 RX ADMIN — LIDOCAINE 1 APPLICATION(S): 4 CREAM TOPICAL at 17:45

## 2019-03-23 RX ADMIN — Medication 100 MILLIGRAM(S): at 06:15

## 2019-03-23 RX ADMIN — Medication 100 MILLIGRAM(S): at 12:29

## 2019-03-23 RX ADMIN — RASAGILINE 1 MILLIGRAM(S): 0.5 TABLET ORAL at 12:29

## 2019-03-23 RX ADMIN — Medication 650 MILLIGRAM(S): at 13:00

## 2019-03-23 RX ADMIN — ATROPA BELLADONNA AND OPIUM 1 SUPPOSITORY(S): 16.2; 6 SUPPOSITORY RECTAL at 21:54

## 2019-03-23 RX ADMIN — Medication 650 MILLIGRAM(S): at 22:30

## 2019-03-23 NOTE — PROGRESS NOTE ADULT - ASSESSMENT
78 year old male with history of parkinson's disease, prostate CA (s/p TURP x2 and radiation), HTN, HLD presenting to the ED with decreased urine output and suprapubic pain and hematuria. found to have OSMIN    OSMIN  peaked 3.34  Baseline Scr ? 1.5 (3/18)  OSMIN likely sec to  ATN   s/p núñez  Renal US negative for obstruction  Ok to start lisinopril on discharge  Urinary work up will not be helpful in view of bladder irrigation  renal function improving continue to monitor  avoid nephrotoxic agents.     Hyperkalemia  likely sec to RF  Serum K improved   low K diet  MOnitor serum K    Hematuria  f/u   On CBI

## 2019-03-23 NOTE — PROGRESS NOTE ADULT - SUBJECTIVE AND OBJECTIVE BOX
Urine still mildly blood-tinged this AM    Vital Signs Last 24 Hrs  T(C): 36.7 (23 Mar 2019 04:53), Max: 36.7 (22 Mar 2019 21:00)  T(F): 98.1 (23 Mar 2019 04:53), Max: 98.1 (23 Mar 2019 04:53)  HR: 60 (23 Mar 2019 04:53) (60 - 76)  BP: 139/73 (23 Mar 2019 04:53) (119/68 - 139/73)  BP(mean): --  RR: 16 (23 Mar 2019 04:53) (16 - 18)  SpO2: 97% (23 Mar 2019 04:53) (94% - 97%)    GENERAL: NAD, confused, +parkinson features of masklike facies, mild tremor, rigidity  HEAD:  Atraumatic, Normocephalic  EYES: EOMI, PERRLA, conjunctiva and sclera clear  NECK: Supple, No JVD  CHEST/LUNG: Clear to auscultation bilaterally; No wheeze  HEART: Regular rate and rhythm; No murmurs, rubs, or gallops  ABDOMEN: Soft, Nontender, Nondistended; Bowel sounds present  Neuro: AAO x 2, slow speech, no focal deficits  : CBI/Arrieta in place, color much lighter now. pinkish. neg CVAT   EXTREMITIES:  2+ Peripheral Pulses, No clubbing, cyanosis, or edema, venous stasis changes  SKIN: inguinal folds with fungal dermatitis    LABS:                        7.9    10.0  )-----------( 405      ( 23 Mar 2019 07:36 )             24.7     03-23    141  |  107  |  15  ----------------------------<  81  4.3   |  24  |  1.30    Ca    8.8      23 Mar 2019 07:34        CAPILLARY BLOOD GLUCOSE

## 2019-03-23 NOTE — PROGRESS NOTE ADULT - SUBJECTIVE AND OBJECTIVE BOX
[facility] [place] [facility]   MD ALEAH COHEN MD RUORU WONG, PA    TEL:  OFFICE: [phone]  DR DIOR CELL: [phone]  CECILLE HUANG CELL: [phone]  DR. CHAIDEZ CELL: [phone]    RENAL FOLLOW UP NOTE  --------------------------------------------------------------------------------  HPI:      Pt seen and examined at bedside.   Kailyn MALDONADO, chest pain     PAST HISTORY  --------------------------------------------------------------------------------  No significant changes to PMH, PSH, FHx, SHx, unless otherwise noted    ALLERGIES & MEDICATIONS  --------------------------------------------------------------------------------  Allergies    No Known Allergies    Intolerances      Standing Inpatient Medications  acetaminophen  IVPB .. 1000 milliGRAM(s) IV Intermittent once  allopurinol 100 milliGRAM(s) Oral daily  amantadine 100 milliGRAM(s) Oral two times a day  carbidopa 36.25 mG/levodopa 145 mG ER 2 Capsule(s) Oral every 6 hours  cefTRIAXone   IVPB 1 Gram(s) IV Intermittent every 24 hours  cefTRIAXone   IVPB      famotidine    Tablet 20 milliGRAM(s) Oral daily  lactated ringers. 1000 milliLiter(s) IV Continuous <Continuous>  lidocaine 2% Gel 1 Application(s) Topical four times a day  lubiprostone 8 MICROGram(s) Oral two times a day  rasagiline Tablet 1 milliGRAM(s) Oral daily  sertraline 100 milliGRAM(s) Oral daily  simvastatin 10 milliGRAM(s) Oral at bedtime  tamsulosin 0.4 milliGRAM(s) Oral at bedtime    PRN Inpatient Medications  acetaminophen   Tablet .. 650 milliGRAM(s) Oral every 6 hours PRN  ALPRAZolam 0.5 milliGRAM(s) Oral two times a day PRN  belladonna 16.2 mG/opium 30 mg Suppository 1 Suppository(s) Rectal every 6 hours PRN  senna 2 Tablet(s) Oral at bedtime PRN      REVIEW OF SYSTEMS  --------------------------------------------------------------------------------  General: no fever  CVS: no chest pain  RESP: no sob, no cough      VITALS/PHYSICAL EXAM  --------------------------------------------------------------------------------  T(C): 36.7 (03-23-19 @ 04:53), Max: 36.7 (03-22-19 @ 21:00)  HR: 60 (03-23-19 @ 04:53) (60 - 79)  BP: 139/73 (03-23-19 @ 04:53) (119/68 - 139/73)  RR: 16 (03-23-19 @ 04:53) (16 - 18)  SpO2: 97% (03-23-19 @ 04:53) (94% - 97%)  Wt(kg): --        03-22-19 @ 07:01  -  03-23-19 @ 07:00  --------------------------------------------------------  IN: 3070 mL / OUT: 0 mL / NET: 3070 mL      Physical Exam:  	Gen: NAD  	HEENT: MMM  	Pulm: CTA B/L  	CV: S1S2  	Abd: Soft, +BS  	Ext: No LE edema B/L                      Neuro: Awake   	Skin: Warm and Dry   	Alfredo núñez    LABS/STUDIES  --------------------------------------------------------------------------------              7.9    10.0  >-----------<  405      [03-23-19 @ 07:36]              24.7     141  |  107  |  15  ----------------------------<  81      [03-23-19 @ 07:34]  4.3   |  24  |  1.30        Ca     8.8     [03-23-19 @ 07:34]            Creatinine Trend:  SCr 1.30 [03-23 @ 07:34]  SCr 1.44 [03-22 @ 06:51]  SCr 1.55 [03-21 @ 08:57]  SCr 1.97 [03-20 @ 07:15]  SCr 2.16 [03-19 @ 16:42]    Urinalysis - [03-16-19 @ 04:02]      Color Red / Appearance Turbid / SG 1.029 / pH See Note      Gluc See Note / Ketone See Note  / Bili See Note / Urobili See Note       Blood See Note / Protein See Note / Leuk Est See Note / Nitrite See Note      RBC >50 / WBC 15 / Hyaline  / Gran  / Sq Epi  / Non Sq Epi  / Bacteria

## 2019-03-23 NOTE — PROGRESS NOTE ADULT - ASSESSMENT
78 year old male with history of parkinson's disease, prostate CA (s/p TURP x2 and radiation), HTN, HLD presenting to the ED from rehab with decreased urine output and suprapubic pain, admitted with OSMIN and concerns for UTI:       Problem: OSMIN (acute kidney injury): pre-renal vs. a component of ATN  Assessment and Plan: -Cr elevated to 3.04 with last documented Cr of 1.5 here, acute elevation. Per patient's wife (Fiance of 20 years), Cr was up to 2s in rehab when patient had urinary retention there.   - f/u appreciated  -renal u/s without hydronephrosis  -avoid nephrotoxic drugs   -pt was also using aleve as an outpt, avoid NSAIDs  -monitor urine output   -still holding Lisinopril.  -renal appreciated   	  Problem: Acute UTI  Assessment and Plan: -pt was on Augmentin as an outpt   urine culture no growth  c/w ceftriaxone IV given symptoms and leukocytosis (last day is 3/22)    Problem: Urinary retention with hematuria  Assessment and Plan: -history of prostate CA with radiation and TURP   -continue with núñez ; CBI slow rate restarted  -urology f/u appreciated  -continue flomax     Problem: Parkinsons disease  Assessment and Plan: -continue with carbidopa/levodopa and azilect    -wife insists on continuing xanax    Problem: Hyperlipidemia  Assessment and Plan: -continue with simvastatin     Problem: Constipation  Assessment and Plan: -cotninue with senna and lubiprostone     Problem: Need for prophylactic measure  Assessment and Plan: -holding chemical DVT ppx due to ongoing bleeding.

## 2019-03-24 LAB
ANION GAP SERPL CALC-SCNC: 12 MMOL/L — SIGNIFICANT CHANGE UP (ref 5–17)
BUN SERPL-MCNC: 12 MG/DL — SIGNIFICANT CHANGE UP (ref 7–23)
CALCIUM SERPL-MCNC: 8.9 MG/DL — SIGNIFICANT CHANGE UP (ref 8.4–10.5)
CHLORIDE SERPL-SCNC: 107 MMOL/L — SIGNIFICANT CHANGE UP (ref 96–108)
CO2 SERPL-SCNC: 24 MMOL/L — SIGNIFICANT CHANGE UP (ref 22–31)
CREAT SERPL-MCNC: 1.27 MG/DL — SIGNIFICANT CHANGE UP (ref 0.5–1.3)
GLUCOSE SERPL-MCNC: 86 MG/DL — SIGNIFICANT CHANGE UP (ref 70–99)
HCT VFR BLD CALC: 25.2 % — LOW (ref 39–50)
HGB BLD-MCNC: 8 G/DL — LOW (ref 13–17)
MCHC RBC-ENTMCNC: 30.1 PG — SIGNIFICANT CHANGE UP (ref 27–34)
MCHC RBC-ENTMCNC: 31.9 GM/DL — LOW (ref 32–36)
MCV RBC AUTO: 94.6 FL — SIGNIFICANT CHANGE UP (ref 80–100)
PLATELET # BLD AUTO: 374 K/UL — SIGNIFICANT CHANGE UP (ref 150–400)
POTASSIUM SERPL-MCNC: 4.5 MMOL/L — SIGNIFICANT CHANGE UP (ref 3.5–5.3)
POTASSIUM SERPL-SCNC: 4.5 MMOL/L — SIGNIFICANT CHANGE UP (ref 3.5–5.3)
RBC # BLD: 2.66 M/UL — LOW (ref 4.2–5.8)
RBC # FLD: 13.3 % — SIGNIFICANT CHANGE UP (ref 10.3–14.5)
SODIUM SERPL-SCNC: 143 MMOL/L — SIGNIFICANT CHANGE UP (ref 135–145)
WBC # BLD: 8.2 K/UL — SIGNIFICANT CHANGE UP (ref 3.8–10.5)
WBC # FLD AUTO: 8.2 K/UL — SIGNIFICANT CHANGE UP (ref 3.8–10.5)

## 2019-03-24 RX ADMIN — SODIUM CHLORIDE 75 MILLILITER(S): 9 INJECTION, SOLUTION INTRAVENOUS at 21:07

## 2019-03-24 RX ADMIN — Medication 0.5 MILLIGRAM(S): at 15:53

## 2019-03-24 RX ADMIN — CARBIDOPA AND LEVODOPA 2 CAPSULE(S): 25; 100 TABLET ORAL at 05:55

## 2019-03-24 RX ADMIN — SERTRALINE 100 MILLIGRAM(S): 25 TABLET, FILM COATED ORAL at 14:33

## 2019-03-24 RX ADMIN — LIDOCAINE 1 APPLICATION(S): 4 CREAM TOPICAL at 00:26

## 2019-03-24 RX ADMIN — Medication 0.5 MILLIGRAM(S): at 00:28

## 2019-03-24 RX ADMIN — CARBIDOPA AND LEVODOPA 2 CAPSULE(S): 25; 100 TABLET ORAL at 19:12

## 2019-03-24 RX ADMIN — CARBIDOPA AND LEVODOPA 2 CAPSULE(S): 25; 100 TABLET ORAL at 23:51

## 2019-03-24 RX ADMIN — TAMSULOSIN HYDROCHLORIDE 0.4 MILLIGRAM(S): 0.4 CAPSULE ORAL at 21:06

## 2019-03-24 RX ADMIN — LIDOCAINE 1 APPLICATION(S): 4 CREAM TOPICAL at 19:12

## 2019-03-24 RX ADMIN — Medication 100 MILLIGRAM(S): at 14:34

## 2019-03-24 RX ADMIN — RASAGILINE 1 MILLIGRAM(S): 0.5 TABLET ORAL at 14:33

## 2019-03-24 RX ADMIN — LUBIPROSTONE 8 MICROGRAM(S): 24 CAPSULE, GELATIN COATED ORAL at 05:55

## 2019-03-24 RX ADMIN — LIDOCAINE 1 APPLICATION(S): 4 CREAM TOPICAL at 05:54

## 2019-03-24 RX ADMIN — Medication 100 MILLIGRAM(S): at 05:55

## 2019-03-24 RX ADMIN — SIMVASTATIN 10 MILLIGRAM(S): 20 TABLET, FILM COATED ORAL at 21:06

## 2019-03-24 RX ADMIN — Medication 100 MILLIGRAM(S): at 19:12

## 2019-03-24 RX ADMIN — FAMOTIDINE 20 MILLIGRAM(S): 10 INJECTION INTRAVENOUS at 14:34

## 2019-03-24 RX ADMIN — LUBIPROSTONE 8 MICROGRAM(S): 24 CAPSULE, GELATIN COATED ORAL at 19:12

## 2019-03-24 RX ADMIN — CARBIDOPA AND LEVODOPA 2 CAPSULE(S): 25; 100 TABLET ORAL at 00:26

## 2019-03-24 RX ADMIN — LIDOCAINE 1 APPLICATION(S): 4 CREAM TOPICAL at 14:34

## 2019-03-24 RX ADMIN — CARBIDOPA AND LEVODOPA 2 CAPSULE(S): 25; 100 TABLET ORAL at 14:33

## 2019-03-24 NOTE — PROGRESS NOTE ADULT - ASSESSMENT
78 year old male with history of parkinson's disease, prostate CA (s/p TURP x2 and radiation), HTN, HLD presenting to the ED with decreased urine output and suprapubic pain and hematuria. found to have OSMIN    OSMIN  peaked 3.34  Baseline Scr ? 1.5 (3/18)  OSMIN likely sec to  ATN   s/p núñez  Renal US negative for obstruction  Ok to start lisinopril on discharge  Urinary work up will not be helpful in view of bladder irrigation  renal function improving continue to monitor  avoid nephrotoxic agents.     Hyperkalemia  likely sec to RF  Serum K improved   low K diet  MOnitor serum K    Hematuria  f/u

## 2019-03-24 NOTE — PROGRESS NOTE ADULT - ASSESSMENT
77 y/o male PMHx Prostate CA s/p XRT 2010 presenting with hematuria and retention found to have OSMIN or unknown etiology. UCx negative.  No hydro, no evidence of obstruction on renal U/s    -Would not hold chemical DVT PPX due to  bleeding, benefit of DVT PPX outweighs minimal risk of worsened hematuria  -Keep Arrieta in place  -Continue CBI, wean as tolerated, continues to improve  -RN to irrigate catheter if leaking or concern for clots  -Creatinine improved  -Hct stable  -Will continue to follow 77 y/o male PMHx Prostate CA s/p XRT 2010 presenting with hematuria and retention found to have OSMIN or unknown etiology. UCx negative.  No hydro, no evidence of obstruction on renal U/s    -Would not hold chemical DVT PPX due to  bleeding, benefit of DVT PPX outweighs minimal risk of worsened hematuria  -Keep Arrieta in place  -CBI clamped, hoping to TOV later today or tomorrow if remains clear  -RN to irrigate catheter if leaking or concern for clots  -Creatinine improved  -Hct stable  -Will continue to follow

## 2019-03-24 NOTE — PROGRESS NOTE ADULT - SUBJECTIVE AND OBJECTIVE BOX
T(F): 97.4, Max: 98.1 (03-23-19 @ 18:47)  HR: 60  BP: 107/58  SpO2: 95%    Gen   Abd      03-23 @ 07:36  WBC 10.0  / Hct 24.7  / SCr 1.3 Urine remains clear on very slow drip, only required irrigation once yesterday, few clots returned.  CBI clamped this AM.    T(F): 97.4, Max: 98.1 (03-23-19 @ 18:47)  HR: 60  BP: 107/58  SpO2: 95%    Gen NAD   Arrieta in place draining yellow urine with sediment    03-23 @ 07:36  WBC 10.0  / Hct 24.7  / SCr 1.3

## 2019-03-24 NOTE — PROGRESS NOTE ADULT - SUBJECTIVE AND OBJECTIVE BOX
Urine light yellow; CBI clamped  Possible TOV next 24 hours    Vital Signs Last 24 Hrs  T(C): 36.7 (24 Mar 2019 09:03), Max: 36.7 (23 Mar 2019 18:47)  T(F): 98.1 (24 Mar 2019 09:03), Max: 98.1 (23 Mar 2019 18:47)  HR: 72 (24 Mar 2019 09:03) (60 - 75)  BP: 125/71 (24 Mar 2019 09:03) (101/49 - 125/71)  BP(mean): --  RR: 18 (24 Mar 2019 09:03) (17 - 18)  SpO2: 97% (24 Mar 2019 09:03) (90% - 97%)    GENERAL: NAD, confused, +parkinson features of masklike facies, mild tremor, rigidity  HEAD:  Atraumatic, Normocephalic  EYES: EOMI, PERRLA, conjunctiva and sclera clear  NECK: Supple, No JVD  CHEST/LUNG: Clear to auscultation bilaterally; No wheeze  HEART: Regular rate and rhythm; No murmurs, rubs, or gallops  ABDOMEN: Soft, Nontender, Nondistended; Bowel sounds present  Neuro: AAO x 2, slow speech, no focal deficits  : CBI/Arrieta in place, color much lighter now. pinkish. neg CVAT   EXTREMITIES:  2+ Peripheral Pulses, No clubbing, cyanosis, or edema, venous stasis changes  SKIN: inguinal folds with fungal dermatitis    LABS:                        8.0    8.2   )-----------( 374      ( 24 Mar 2019 08:46 )             25.2     03-24    143  |  107  |  12  ----------------------------<  86  4.5   |  24  |  1.27    Ca    8.9      24 Mar 2019 08:46        CAPILLARY BLOOD GLUCOSE

## 2019-03-24 NOTE — PROGRESS NOTE ADULT - ASSESSMENT
78 year old male with history of parkinson's disease, prostate CA (s/p TURP x2 and radiation), HTN, HLD presenting to the ED from rehab with decreased urine output and suprapubic pain, admitted with OSMIN and concerns for UTI:       Problem: OSMIN (acute kidney injury): pre-renal vs. a component of ATN  Assessment and Plan: -Cr elevated to 3.04 with last documented Cr of 1.5 here, acute elevation. Per patient's wife (Fiance of 20 years), Cr was up to 2s in rehab when patient had urinary retention there.   - f/u appreciated  -renal u/s without hydronephrosis  -avoid nephrotoxic drugs   -pt was also using aleve as an outpt, avoid NSAIDs  -monitor urine output   -still holding Lisinopril but will restart upon d/c  -renal appreciated   -resolved  	  Problem: Acute UTI  Assessment and Plan: -pt was on Augmentin as an outpt   urine culture no growth  finished ceftriaxone on 3/22    Problem: Urinary retention with hematuria  Assessment and Plan: -history of prostate CA with radiation and TURP   -continue with núñez ; CBI clamped  -hopefully TOV Monday  -urology f/u appreciated  -continue flomax     Problem: Parkinsons disease  Assessment and Plan: -continue with carbidopa/levodopa and azilect    -wife insists on continuing xanax    Problem: Hyperlipidemia  Assessment and Plan: -continue with simvastatin     Problem: Constipation  Assessment and Plan: -continue with senna and lubiprostone     Problem: Need for prophylactic measure  Assessment and Plan: -holding chemical DVT ppx due to ongoing bleeding.

## 2019-03-24 NOTE — PROGRESS NOTE ADULT - SUBJECTIVE AND OBJECTIVE BOX
[facility] [place] [facility]   MD ALEAH COHEN MD RUORU WONG, PA    TEL:  OFFICE: [phone]  DR DIOR CELL: [phone]  CECILLE HUANG CELL: [phone]  DR. CHAIDEZ CELL: [phone]    RENAL FOLLOW UP NOTE  --------------------------------------------------------------------------------  HPI:      Pt seen and examined at bedside.   Kailyn MALDONADO, chest pain     PAST HISTORY  --------------------------------------------------------------------------------  No significant changes to PMH, PSH, FHx, SHx, unless otherwise noted    ALLERGIES & MEDICATIONS  --------------------------------------------------------------------------------  Allergies    No Known Allergies    Intolerances      Standing Inpatient Medications  acetaminophen  IVPB .. 1000 milliGRAM(s) IV Intermittent once  allopurinol 100 milliGRAM(s) Oral daily  amantadine 100 milliGRAM(s) Oral two times a day  carbidopa 36.25 mG/levodopa 145 mG ER 2 Capsule(s) Oral every 6 hours  cefTRIAXone   IVPB 1 Gram(s) IV Intermittent every 24 hours  cefTRIAXone   IVPB      famotidine    Tablet 20 milliGRAM(s) Oral daily  lactated ringers. 1000 milliLiter(s) IV Continuous <Continuous>  lidocaine 2% Gel 1 Application(s) Topical four times a day  lubiprostone 8 MICROGram(s) Oral two times a day  rasagiline Tablet 1 milliGRAM(s) Oral daily  sertraline 100 milliGRAM(s) Oral daily  simvastatin 10 milliGRAM(s) Oral at bedtime  tamsulosin 0.4 milliGRAM(s) Oral at bedtime    PRN Inpatient Medications  acetaminophen   Tablet .. 650 milliGRAM(s) Oral every 6 hours PRN  ALPRAZolam 0.5 milliGRAM(s) Oral two times a day PRN  belladonna 16.2 mG/opium 30 mg Suppository 1 Suppository(s) Rectal every 6 hours PRN  senna 2 Tablet(s) Oral at bedtime PRN      REVIEW OF SYSTEMS  --------------------------------------------------------------------------------  General: no fever  CVS: no chest pain  RESP: no sob, no cough  ABD: no abdominal pain      VITALS/PHYSICAL EXAM  --------------------------------------------------------------------------------  T(C): 36.3 (03-24-19 @ 05:43), Max: 36.7 (03-23-19 @ 18:47)  HR: 60 (03-24-19 @ 05:43) (60 - 75)  BP: 107/58 (03-24-19 @ 05:43) (101/49 - 125/69)  RR: 18 (03-24-19 @ 05:43) (17 - 18)  SpO2: 95% (03-24-19 @ 05:43) (90% - 97%)  Wt(kg): --        03-23-19 @ 07:01  -  03-24-19 @ 07:00  --------------------------------------------------------  IN: 2500 mL / OUT: 0 mL / NET: 2500 mL      Physical Exam:  	Gen: NAD  	HEENT: MMM  	Pulm: CTA B/L  	CV: S1S2  	Abd: Soft, +BS  	Ext: No LE edema B/L                      Neuro: Awake   	Skin: Warm and Dry   	Gu no núñez    LABS/STUDIES  --------------------------------------------------------------------------------              7.9    10.0  >-----------<  405      [03-23-19 @ 07:36]              24.7     141  |  107  |  15  ----------------------------<  81      [03-23-19 @ 07:34]  4.3   |  24  |  1.30        Ca     8.8     [03-23-19 @ 07:34]            Creatinine Trend:  SCr 1.30 [03-23 @ 07:34]  SCr 1.44 [03-22 @ 06:51]  SCr 1.55 [03-21 @ 08:57]  SCr 1.97 [03-20 @ 07:15]  SCr 2.16 [03-19 @ 16:42]    Urinalysis - [03-16-19 @ 04:02]      Color Red / Appearance Turbid / SG 1.029 / pH See Note      Gluc See Note / Ketone See Note  / Bili See Note / Urobili See Note       Blood See Note / Protein See Note / Leuk Est See Note / Nitrite See Note      RBC >50 / WBC 15 / Hyaline  / Gran  / Sq Epi  / Non Sq Epi  / Bacteria

## 2019-03-24 NOTE — CHART NOTE - NSCHARTNOTEFT_GEN_A_CORE
Urine remains clear off CBI.  Recommend TOV at midnight.  Check PVR after voiding to ensure complete emptying.

## 2019-03-25 RX ORDER — OXYCODONE HYDROCHLORIDE 5 MG/1
5 TABLET ORAL ONCE
Qty: 0 | Refills: 0 | Status: DISCONTINUED | OUTPATIENT
Start: 2019-03-25 | End: 2019-03-25

## 2019-03-25 RX ORDER — ATROPA BELLADONNA AND OPIUM 16.2; 6 MG/1; MG/1
1 SUPPOSITORY RECTAL EVERY 6 HOURS
Qty: 0 | Refills: 0 | Status: DISCONTINUED | OUTPATIENT
Start: 2019-03-25 | End: 2019-03-26

## 2019-03-25 RX ADMIN — Medication 0.5 MILLIGRAM(S): at 10:40

## 2019-03-25 RX ADMIN — LIDOCAINE 1 APPLICATION(S): 4 CREAM TOPICAL at 04:53

## 2019-03-25 RX ADMIN — LIDOCAINE 1 APPLICATION(S): 4 CREAM TOPICAL at 23:42

## 2019-03-25 RX ADMIN — SIMVASTATIN 10 MILLIGRAM(S): 20 TABLET, FILM COATED ORAL at 21:16

## 2019-03-25 RX ADMIN — LIDOCAINE 1 APPLICATION(S): 4 CREAM TOPICAL at 17:26

## 2019-03-25 RX ADMIN — TAMSULOSIN HYDROCHLORIDE 0.4 MILLIGRAM(S): 0.4 CAPSULE ORAL at 21:16

## 2019-03-25 RX ADMIN — CARBIDOPA AND LEVODOPA 2 CAPSULE(S): 25; 100 TABLET ORAL at 23:38

## 2019-03-25 RX ADMIN — Medication 100 MILLIGRAM(S): at 17:25

## 2019-03-25 RX ADMIN — SERTRALINE 100 MILLIGRAM(S): 25 TABLET, FILM COATED ORAL at 12:04

## 2019-03-25 RX ADMIN — CARBIDOPA AND LEVODOPA 2 CAPSULE(S): 25; 100 TABLET ORAL at 04:53

## 2019-03-25 RX ADMIN — RASAGILINE 1 MILLIGRAM(S): 0.5 TABLET ORAL at 12:04

## 2019-03-25 RX ADMIN — LUBIPROSTONE 8 MICROGRAM(S): 24 CAPSULE, GELATIN COATED ORAL at 17:25

## 2019-03-25 RX ADMIN — Medication 100 MILLIGRAM(S): at 04:54

## 2019-03-25 RX ADMIN — CARBIDOPA AND LEVODOPA 2 CAPSULE(S): 25; 100 TABLET ORAL at 17:25

## 2019-03-25 RX ADMIN — CARBIDOPA AND LEVODOPA 2 CAPSULE(S): 25; 100 TABLET ORAL at 12:04

## 2019-03-25 RX ADMIN — Medication 100 MILLIGRAM(S): at 12:07

## 2019-03-25 RX ADMIN — OXYCODONE HYDROCHLORIDE 5 MILLIGRAM(S): 5 TABLET ORAL at 17:25

## 2019-03-25 RX ADMIN — LUBIPROSTONE 8 MICROGRAM(S): 24 CAPSULE, GELATIN COATED ORAL at 04:54

## 2019-03-25 RX ADMIN — FAMOTIDINE 20 MILLIGRAM(S): 10 INJECTION INTRAVENOUS at 12:04

## 2019-03-25 RX ADMIN — Medication 400 MILLIGRAM(S): at 17:25

## 2019-03-25 NOTE — PROCEDURE NOTE - NSURITECHNIQUE_GU_A_CORE
The catheter was appropriately lubricated/The collection bag is below the level of the patient and urinary bladder/Proper hand hygiene was performed/Sterile gloves were worn for the duration of the procedure/The catheter was secured with a securement device (e.g. StatLock)/A sterile drape was used to cover all adjacent areas/The site was cleaned with soap/water and sterile solution (betadine)/The urinary drainage system is closed at the end of the procedure/All applicable medical record documentation is completed

## 2019-03-25 NOTE — PROGRESS NOTE ADULT - SUBJECTIVE AND OBJECTIVE BOX
[facility] [place] [facility]   MD ALEAH COHEN MD RUORU WONG, PA    TEL:  OFFICE: [phone]  DR DIOR CELL: [phone]  CECILLE HUANG CELL: [phone]  DR. CHAIDEZ CELL: [phone]    RENAL FOLLOW UP NOTE  --------------------------------------------------------------------------------  HPI:      Pt seen and examined at bedside.   Kailyn MALDONADO, chest pain     PAST HISTORY  --------------------------------------------------------------------------------  No significant changes to PMH, PSH, FHx, SHx, unless otherwise noted    ALLERGIES & MEDICATIONS  --------------------------------------------------------------------------------  Allergies    No Known Allergies    Intolerances      Standing Inpatient Medications  acetaminophen  IVPB .. 1000 milliGRAM(s) IV Intermittent once  allopurinol 100 milliGRAM(s) Oral daily  amantadine 100 milliGRAM(s) Oral two times a day  carbidopa 36.25 mG/levodopa 145 mG ER 2 Capsule(s) Oral every 6 hours  famotidine    Tablet 20 milliGRAM(s) Oral daily  lactated ringers. 1000 milliLiter(s) IV Continuous <Continuous>  lidocaine 2% Gel 1 Application(s) Topical four times a day  lubiprostone 8 MICROGram(s) Oral two times a day  rasagiline Tablet 1 milliGRAM(s) Oral daily  sertraline 100 milliGRAM(s) Oral daily  simvastatin 10 milliGRAM(s) Oral at bedtime  tamsulosin 0.4 milliGRAM(s) Oral at bedtime    PRN Inpatient Medications  acetaminophen   Tablet .. 650 milliGRAM(s) Oral every 6 hours PRN  ALPRAZolam 0.5 milliGRAM(s) Oral two times a day PRN  senna 2 Tablet(s) Oral at bedtime PRN      REVIEW OF SYSTEMS  --------------------------------------------------------------------------------  General: no fever  CVS: no chest pain  RESP: no sob, no cough  ABD: no abdominal pain      VITALS/PHYSICAL EXAM  --------------------------------------------------------------------------------  T(C): 36.4 (03-25-19 @ 09:22), Max: 36.9 (03-25-19 @ 04:42)  HR: 56 (03-25-19 @ 09:22) (56 - 82)  BP: 131/80 (03-25-19 @ 09:22) (101/64 - 150/72)  RR: 18 (03-25-19 @ 09:22) (16 - 18)  SpO2: 95% (03-25-19 @ 09:22) (95% - 98%)  Wt(kg): --        03-24-19 @ 07:01  -  03-25-19 @ 07:00  --------------------------------------------------------  IN: 2190 mL / OUT: 2975 mL / NET: -785 mL      Physical Exam:  	Gen: NAD  	HEENT: MMM  	Pulm: CTA B/L  	CV: S1S2  	Abd: Soft, +BS  	Ext: No LE edema B/L                      Neuro: Awake   	Skin: Warm and Dry   	Gu no [place]    LABS/STUDIES  --------------------------------------------------------------------------------              8.0    8.2   >-----------<  374      [03-24-19 @ 08:46]              25.2     143  |  107  |  12  ----------------------------<  86      [03-24-19 @ 08:46]  4.5   |  24  |  1.27        Ca     8.9     [03-24-19 @ 08:46]            Creatinine Trend:  SCr 1.27 [03-24 @ 08:46]  SCr 1.30 [03-23 @ 07:34]  SCr 1.44 [03-22 @ 06:51]  SCr 1.55 [03-21 @ 08:57]  SCr 1.97 [03-20 @ 07:15]    Urinalysis - [03-16-19 @ 04:02]      Color Red / Appearance Turbid / SG 1.029 / pH See Note      Gluc See Note / Ketone See Note  / Bili See Note / Urobili See Note       Blood See Note / Protein See Note / Leuk Est See Note / Nitrite See Note      RBC >50 / WBC 15 / Hyaline  / Gran  / Sq Epi  / Non Sq Epi  / Bacteria

## 2019-03-25 NOTE — PROGRESS NOTE ADULT - ASSESSMENT
79 y/o male PMHx Prostate CA s/p XRT 2010 presenting with hematuria and retention found to have OSMIN or unknown etiology. UCx negative.  No hydro, no evidence of obstruction on renal U/s    -Would not hold chemical DVT PPX due to  bleeding, benefit of DVT PPX outweighs minimal risk of worsened hematuria  -TOV today  -Bladder scan Post Void Residual  -Creatinine improved  -Hct stable  -Will continue to follow 77 y/o male PMHx Prostate CA s/p XRT 2010 presenting with hematuria and retention found to have OSMIN or unknown etiology. UCx negative.  No hydro, no evidence of obstruction on renal U/s    -Would not hold chemical DVT PPX due to  bleeding, benefit of DVT PPX outweighs minimal risk of worsened hematuria  -TOV today  -Bladder scan Post Void Residual  -Creatinine improved  -Hct stable  -Should follow up in the office with Dr. Calle in 1 week  -[facility] [phone]

## 2019-03-25 NOTE — DIETITIAN INITIAL EVALUATION ADULT. - OTHER INFO
Patient seen for routine length of stay assessment.  Patient found sleeping but arousable.  As per wife, patient is eating fairly related to swallowing difficulty and low potassium diet.  Wife would like diet liberalized and advised wife to follow up with PMD to see if low potassium diet is still needed. K levels are now WDL.  Notes that patient has been on swallow therapy for a long time.  Aware of what patient does best with.  Cannot tolerate pasta or rice and does well with potato which is high in K.  Weight loss of about 15 pounds noted which wife attributes to recent admit at another hospital and dislike for food.  Expects patient to do better once home with home cooking.  Discussed foods high in K and written diet copy given,

## 2019-03-25 NOTE — PROCEDURE NOTE - ADDITIONAL PROCEDURE DETAILS
Called by RN to assess cbi color. Pt with cranberry urine running on fast cbi. Balloon taken down and advanced to hub, pt with significant bladder spasms. scant clots evacuated and balloon replaced. cbi restarted at a lower rate and color dilute pink
Urology was called to place a Arrieta for the patient for urinary retention. hx of Jackie and hematuria     Procedure note: Patient is prepped and draped aseptically. A 20Fr 3way Arrieta was placed through urethral meatus with clear yellow urine return (PVR 600cc) without difficulty. Patient tolerated the procedure very well.

## 2019-03-25 NOTE — PROGRESS NOTE ADULT - ASSESSMENT
78 year old male with history of parkinson's disease, prostate CA (s/p TURP x2 and radiation), HTN, HLD presenting to the ED from rehab with decreased urine output and suprapubic pain, admitted with OSMIN and concerns for UTI:       Problem: OSMIN (acute kidney injury): pre-renal vs. a component of ATN  Assessment and Plan: -Cr elevated to 3.04 with last documented Cr of 1.5 here, acute elevation. Per patient's wife (Fiance of 20 years), Cr was up to 2s in rehab when patient had urinary retention there.   - f/u appreciated  -renal u/s without hydronephrosis  -avoid nephrotoxic drugs   -pt was also using aleve as an outpt, avoid NSAIDs  -monitor urine output   -still holding Lisinopril but will restart upon d/c  -renal appreciated   -resolved  	  Problem: Acute UTI  Assessment and Plan: -pt was on Augmentin as an outpt   urine culture no growth  finished ceftriaxone on 3/22    Problem: Urinary retention with hematuria  Assessment and Plan: -history of prostate CA with radiation and TURP   -núñez removed 3/25/19  -urology f/u appreciated  -continue flomax     Problem: Parkinsons disease  Assessment and Plan: -continue with carbidopa/levodopa and azilect    -wife insists on continuing xanax    Problem: Hyperlipidemia  Assessment and Plan: -continue with simvastatin     Problem: Constipation  Assessment and Plan: -continue with senna and lubiprostone     Problem: Need for prophylactic measure  Assessment and Plan: -holding chemical DVT ppx due to ongoing bleeding.

## 2019-03-25 NOTE — DIETITIAN INITIAL EVALUATION ADULT. - PERTINENT MEDS FT
MEDICATIONS  (STANDING):  acetaminophen  IVPB .. 1000 milliGRAM(s) IV Intermittent once  allopurinol 100 milliGRAM(s) Oral daily  amantadine 100 milliGRAM(s) Oral two times a day  carbidopa 36.25 mG/levodopa 145 mG ER 2 Capsule(s) Oral every 6 hours  famotidine    Tablet 20 milliGRAM(s) Oral daily  lactated ringers. 1000 milliLiter(s) (75 mL/Hr) IV Continuous <Continuous>  lidocaine 2% Gel 1 Application(s) Topical four times a day  lubiprostone 8 MICROGram(s) Oral two times a day  rasagiline Tablet 1 milliGRAM(s) Oral daily  sertraline 100 milliGRAM(s) Oral daily  simvastatin 10 milliGRAM(s) Oral at bedtime  tamsulosin 0.4 milliGRAM(s) Oral at bedtime    MEDICATIONS  (PRN):  acetaminophen   Tablet .. 650 milliGRAM(s) Oral every 6 hours PRN Moderate Pain (4 - 6)  ALPRAZolam 0.5 milliGRAM(s) Oral two times a day PRN anxiety  senna 2 Tablet(s) Oral at bedtime PRN Constipation

## 2019-03-25 NOTE — PROGRESS NOTE ADULT - SUBJECTIVE AND OBJECTIVE BOX
Doing well, urine remains clear overnight. Núñez was left in place, now removed at bedside    T(F): 98.4, Max: 98.4 (03-25-19 @ 04:42)  HR: 82  BP: 150/72  SpO2: 98%    OUT:    Indwelling Catheter - Urethral: 2975 mL  Total OUT: 2975 mL        Gen NAD  Abd soft, mild distention, nontender   núñez clear yellow, minimal brown sediment, no clots      03-24 @ 08:46    WBC 8.2   / Hct 25.2  / SCr 1.27 Doing well, urine remains clear overnight. Núñez was left in place, now removed at bedside    T(F): 98.4, Max: 98.4 (03-25-19 @ 04:42)  HR: 82  BP: 150/72  SpO2: 98%    OUT:    Indwelling Catheter - Urethral: 2975 mL    Gen NAD  Abd soft, mild distention, nontender   núñez clear yellow, minimal brown sediment, no clots    03-24 @ 08:46  WBC 8.2   / Hct 25.2  / SCr 1.27

## 2019-03-25 NOTE — DIETITIAN INITIAL EVALUATION ADULT. - ENERGY NEEDS
HT 70 inches,  pounds,  pounds, BMI 30.8,  pounds  Dxd with OSMIN, Prostate cancer, parkinson's disease, swallow difficulty   Skin intact, generally well nourished.  Appetite good. Fluids per team.

## 2019-03-25 NOTE — DIETITIAN INITIAL EVALUATION ADULT. - NS AS NUTRI INTERV ED CONTENT
Purpose of the nutrition education/Recommended modifications/Reviewed soft diet and foods that may be beat tolerated as well as low [potassium diet.

## 2019-03-25 NOTE — DIETITIAN INITIAL EVALUATION ADULT. - SIGNS/SYMPTOMS
Esophageal dysmotility, twisted esophagus, need for soft diet Hx of parkinson's, achalasia,  need for soft diet

## 2019-03-25 NOTE — CHART NOTE - NSCHARTNOTEFT_GEN_A_CORE
Pt with failed trial of void, post void bladder scan with @515 ml urinary retention. Urology notified. Will come to reeval pt and replace the 3-way catheter. Pt c/o significant pain with previous catheterization. Single PO Oxycodone 5mg with IV Tylenol admin as premedication for catheterization.

## 2019-03-25 NOTE — PROGRESS NOTE ADULT - SUBJECTIVE AND OBJECTIVE BOX
Meenakshi taken out    Vital Signs Last 24 Hrs  T(C): 36.4 (25 Mar 2019 09:22), Max: 36.9 (25 Mar 2019 04:42)  T(F): 97.6 (25 Mar 2019 09:22), Max: 98.4 (25 Mar 2019 04:42)  HR: 56 (25 Mar 2019 09:22) (56 - 82)  BP: 131/80 (25 Mar 2019 09:22) (101/64 - 150/72)  BP(mean): --  RR: 18 (25 Mar 2019 09:22) (16 - 18)  SpO2: 95% (25 Mar 2019 09:22) (95% - 98%)    GENERAL: NAD, confused, +parkinson features of masklike facies, mild tremor, rigidity  HEAD:  Atraumatic, Normocephalic  EYES: EOMI, PERRLA, conjunctiva and sclera clear  NECK: Supple, No JVD  CHEST/LUNG: Clear to auscultation bilaterally; No wheeze  HEART: Regular rate and rhythm; No murmurs, rubs, or gallops  ABDOMEN: Soft, Nontender, Nondistended; Bowel sounds present  Neuro: AAO x 2, slow speech, no focal deficits  EXTREMITIES:  2+ Peripheral Pulses, No clubbing, cyanosis, or edema, venous stasis changes  SKIN: inguinal folds with fungal dermatitis    LABS:                        8.0    8.2   )-----------( 374      ( 24 Mar 2019 08:46 )             25.2     03-24    143  |  107  |  12  ----------------------------<  86  4.5   |  24  |  1.27    Ca    8.9      24 Mar 2019 08:46        CAPILLARY BLOOD GLUCOSE

## 2019-03-25 NOTE — PROGRESS NOTE ADULT - ASSESSMENT
78 year old male with history of parkinson's disease, prostate CA (s/p TURP x2 and radiation), HTN, HLD presenting to the ED with decreased urine output and suprapubic pain and hematuria. found to have OSMIN    OSMIN  peaked 3.34  Baseline Scr ? 1.5 (3/18)  OSMIN likely sec to  ATN   Núñez removed today  Renal US negative for obstruction  Ok to start lisinopril on discharge  renal function improving continue to monitor  avoid nephrotoxic agents.     Hyperkalemia  likely sec to RF  Serum K improved   low K diet  MOnitor serum K    Hematuria  f/u   s/p núñez removal

## 2019-03-26 VITALS
HEART RATE: 70 BPM | SYSTOLIC BLOOD PRESSURE: 112 MMHG | DIASTOLIC BLOOD PRESSURE: 68 MMHG | RESPIRATION RATE: 17 BRPM | TEMPERATURE: 98 F | OXYGEN SATURATION: 95 %

## 2019-03-26 LAB
ANION GAP SERPL CALC-SCNC: 11 MMOL/L — SIGNIFICANT CHANGE UP (ref 5–17)
BASOPHILS # BLD AUTO: 0.1 K/UL — SIGNIFICANT CHANGE UP (ref 0–0.2)
BASOPHILS NFR BLD AUTO: 0.7 % — SIGNIFICANT CHANGE UP (ref 0–2)
BUN SERPL-MCNC: 10 MG/DL — SIGNIFICANT CHANGE UP (ref 7–23)
CALCIUM SERPL-MCNC: 8.7 MG/DL — SIGNIFICANT CHANGE UP (ref 8.4–10.5)
CHLORIDE SERPL-SCNC: 103 MMOL/L — SIGNIFICANT CHANGE UP (ref 96–108)
CO2 SERPL-SCNC: 24 MMOL/L — SIGNIFICANT CHANGE UP (ref 22–31)
CREAT SERPL-MCNC: 1.09 MG/DL — SIGNIFICANT CHANGE UP (ref 0.5–1.3)
EOSINOPHIL # BLD AUTO: 0.7 K/UL — HIGH (ref 0–0.5)
EOSINOPHIL NFR BLD AUTO: 6.5 % — HIGH (ref 0–6)
GLUCOSE SERPL-MCNC: 90 MG/DL — SIGNIFICANT CHANGE UP (ref 70–99)
HCT VFR BLD CALC: 22.6 % — LOW (ref 39–50)
HCT VFR BLD CALC: 23.8 % — LOW (ref 39–50)
HGB BLD-MCNC: 7.4 G/DL — LOW (ref 13–17)
HGB BLD-MCNC: 8.1 G/DL — LOW (ref 13–17)
LYMPHOCYTES # BLD AUTO: 1.8 K/UL — SIGNIFICANT CHANGE UP (ref 1–3.3)
LYMPHOCYTES # BLD AUTO: 16.4 % — SIGNIFICANT CHANGE UP (ref 13–44)
MCHC RBC-ENTMCNC: 30.5 PG — SIGNIFICANT CHANGE UP (ref 27–34)
MCHC RBC-ENTMCNC: 31.5 PG — SIGNIFICANT CHANGE UP (ref 27–34)
MCHC RBC-ENTMCNC: 32.6 GM/DL — SIGNIFICANT CHANGE UP (ref 32–36)
MCHC RBC-ENTMCNC: 33.8 GM/DL — SIGNIFICANT CHANGE UP (ref 32–36)
MCV RBC AUTO: 93.2 FL — SIGNIFICANT CHANGE UP (ref 80–100)
MCV RBC AUTO: 93.6 FL — SIGNIFICANT CHANGE UP (ref 80–100)
MONOCYTES # BLD AUTO: 0.8 K/UL — SIGNIFICANT CHANGE UP (ref 0–0.9)
MONOCYTES NFR BLD AUTO: 7.1 % — SIGNIFICANT CHANGE UP (ref 2–14)
NEUTROPHILS # BLD AUTO: 7.7 K/UL — HIGH (ref 1.8–7.4)
NEUTROPHILS NFR BLD AUTO: 69.3 % — SIGNIFICANT CHANGE UP (ref 43–77)
PLAT MORPH BLD: NORMAL — SIGNIFICANT CHANGE UP
PLATELET # BLD AUTO: 353 K/UL — SIGNIFICANT CHANGE UP (ref 150–400)
PLATELET # BLD AUTO: 366 K/UL — SIGNIFICANT CHANGE UP (ref 150–400)
POTASSIUM SERPL-MCNC: 3.9 MMOL/L — SIGNIFICANT CHANGE UP (ref 3.5–5.3)
POTASSIUM SERPL-SCNC: 3.9 MMOL/L — SIGNIFICANT CHANGE UP (ref 3.5–5.3)
RBC # BLD: 2.41 M/UL — LOW (ref 4.2–5.8)
RBC # BLD: 2.56 M/UL — LOW (ref 4.2–5.8)
RBC # FLD: 13.2 % — SIGNIFICANT CHANGE UP (ref 10.3–14.5)
RBC # FLD: 13.4 % — SIGNIFICANT CHANGE UP (ref 10.3–14.5)
RBC BLD AUTO: SIGNIFICANT CHANGE UP
SODIUM SERPL-SCNC: 138 MMOL/L — SIGNIFICANT CHANGE UP (ref 135–145)
WBC # BLD: 10.2 K/UL — SIGNIFICANT CHANGE UP (ref 3.8–10.5)
WBC # BLD: 11.1 K/UL — HIGH (ref 3.8–10.5)
WBC # FLD AUTO: 10.2 K/UL — SIGNIFICANT CHANGE UP (ref 3.8–10.5)
WBC # FLD AUTO: 11.1 K/UL — HIGH (ref 3.8–10.5)

## 2019-03-26 PROCEDURE — 97530 THERAPEUTIC ACTIVITIES: CPT

## 2019-03-26 PROCEDURE — 86901 BLOOD TYPING SEROLOGIC RH(D): CPT

## 2019-03-26 PROCEDURE — 87086 URINE CULTURE/COLONY COUNT: CPT

## 2019-03-26 PROCEDURE — 86900 BLOOD TYPING SEROLOGIC ABO: CPT

## 2019-03-26 PROCEDURE — 86850 RBC ANTIBODY SCREEN: CPT

## 2019-03-26 PROCEDURE — 76770 US EXAM ABDO BACK WALL COMP: CPT

## 2019-03-26 PROCEDURE — 97161 PT EVAL LOW COMPLEX 20 MIN: CPT

## 2019-03-26 PROCEDURE — 81001 URINALYSIS AUTO W/SCOPE: CPT

## 2019-03-26 PROCEDURE — 97116 GAIT TRAINING THERAPY: CPT

## 2019-03-26 PROCEDURE — 80053 COMPREHEN METABOLIC PANEL: CPT

## 2019-03-26 PROCEDURE — 96376 TX/PRO/DX INJ SAME DRUG ADON: CPT | Mod: XU

## 2019-03-26 PROCEDURE — 85027 COMPLETE CBC AUTOMATED: CPT

## 2019-03-26 PROCEDURE — 99285 EMERGENCY DEPT VISIT HI MDM: CPT | Mod: 25

## 2019-03-26 PROCEDURE — 80048 BASIC METABOLIC PNL TOTAL CA: CPT

## 2019-03-26 PROCEDURE — 96374 THER/PROPH/DIAG INJ IV PUSH: CPT | Mod: XU

## 2019-03-26 PROCEDURE — 51700 IRRIGATION OF BLADDER: CPT

## 2019-03-26 PROCEDURE — 83735 ASSAY OF MAGNESIUM: CPT

## 2019-03-26 RX ORDER — CARBIDOPA AND LEVODOPA 25; 100 MG/1; MG/1
2 TABLET ORAL
Qty: 0 | Refills: 0 | COMMUNITY
Start: 2019-03-26

## 2019-03-26 RX ORDER — ALLOPURINOL 300 MG
1 TABLET ORAL
Qty: 0 | Refills: 0 | COMMUNITY

## 2019-03-26 RX ORDER — SERTRALINE 25 MG/1
1 TABLET, FILM COATED ORAL
Qty: 0 | Refills: 0 | COMMUNITY
Start: 2019-03-26

## 2019-03-26 RX ORDER — ACETAMINOPHEN 500 MG
2 TABLET ORAL
Qty: 0 | Refills: 0 | COMMUNITY
Start: 2019-03-26

## 2019-03-26 RX ORDER — AMANTADINE HCL 100 MG
1 CAPSULE ORAL
Qty: 0 | Refills: 0 | COMMUNITY
Start: 2019-03-26

## 2019-03-26 RX ORDER — RASAGILINE 0.5 MG/1
1 TABLET ORAL
Qty: 0 | Refills: 0 | COMMUNITY
Start: 2019-03-26

## 2019-03-26 RX ORDER — TAMSULOSIN HYDROCHLORIDE 0.4 MG/1
1 CAPSULE ORAL
Qty: 0 | Refills: 0 | COMMUNITY

## 2019-03-26 RX ORDER — ALLOPURINOL 300 MG
1 TABLET ORAL
Qty: 0 | Refills: 0 | COMMUNITY
Start: 2019-03-26

## 2019-03-26 RX ORDER — CARBIDOPA AND LEVODOPA 25; 100 MG/1; MG/1
2 TABLET ORAL
Qty: 0 | Refills: 0 | COMMUNITY

## 2019-03-26 RX ORDER — SIMVASTATIN 20 MG/1
1 TABLET, FILM COATED ORAL
Qty: 0 | Refills: 0 | COMMUNITY

## 2019-03-26 RX ORDER — TAMSULOSIN HYDROCHLORIDE 0.4 MG/1
1 CAPSULE ORAL
Qty: 0 | Refills: 0 | COMMUNITY
Start: 2019-03-26

## 2019-03-26 RX ORDER — SIMVASTATIN 20 MG/1
1 TABLET, FILM COATED ORAL
Qty: 0 | Refills: 0 | COMMUNITY
Start: 2019-03-26

## 2019-03-26 RX ORDER — AMANTADINE HCL 100 MG
1 CAPSULE ORAL
Qty: 0 | Refills: 0 | COMMUNITY

## 2019-03-26 RX ADMIN — CARBIDOPA AND LEVODOPA 2 CAPSULE(S): 25; 100 TABLET ORAL at 05:37

## 2019-03-26 RX ADMIN — Medication 100 MILLIGRAM(S): at 11:07

## 2019-03-26 RX ADMIN — CARBIDOPA AND LEVODOPA 2 CAPSULE(S): 25; 100 TABLET ORAL at 11:08

## 2019-03-26 RX ADMIN — LIDOCAINE 1 APPLICATION(S): 4 CREAM TOPICAL at 05:37

## 2019-03-26 RX ADMIN — LUBIPROSTONE 8 MICROGRAM(S): 24 CAPSULE, GELATIN COATED ORAL at 05:37

## 2019-03-26 RX ADMIN — FAMOTIDINE 20 MILLIGRAM(S): 10 INJECTION INTRAVENOUS at 11:07

## 2019-03-26 RX ADMIN — RASAGILINE 1 MILLIGRAM(S): 0.5 TABLET ORAL at 11:07

## 2019-03-26 RX ADMIN — Medication 100 MILLIGRAM(S): at 05:37

## 2019-03-26 RX ADMIN — SERTRALINE 100 MILLIGRAM(S): 25 TABLET, FILM COATED ORAL at 11:07

## 2019-03-26 RX ADMIN — LIDOCAINE 1 APPLICATION(S): 4 CREAM TOPICAL at 11:08

## 2019-03-26 NOTE — PROGRESS NOTE ADULT - REASON FOR ADMISSION
difficulty urinating, hematuria

## 2019-03-26 NOTE — PROGRESS NOTE ADULT - SUBJECTIVE AND OBJECTIVE BOX
[facility] [place] [facility]   MD ALEAH COHEN MD RUORU WONG, PA    TEL:  OFFICE: [phone]  DR DIOR CELL: [phone]  CECILLE HUANG CELL: [phone]  DR. CHAIDEZ CELL: [phone]    RENAL FOLLOW UP NOTE  --------------------------------------------------------------------------------  HPI:      Pt seen and examined at bedside.   Kailyn MALDONADO, chest pain     PAST HISTORY  --------------------------------------------------------------------------------  No significant changes to PMH, PSH, FHx, SHx, unless otherwise noted    ALLERGIES & MEDICATIONS  --------------------------------------------------------------------------------  Allergies    No Known Allergies    Intolerances      Standing Inpatient Medications  allopurinol 100 milliGRAM(s) Oral daily  amantadine 100 milliGRAM(s) Oral two times a day  carbidopa 36.25 mG/levodopa 145 mG ER 2 Capsule(s) Oral every 6 hours  famotidine    Tablet 20 milliGRAM(s) Oral daily  lactated ringers. 1000 milliLiter(s) IV Continuous <Continuous>  lidocaine 2% Gel 1 Application(s) Topical four times a day  lubiprostone 8 MICROGram(s) Oral two times a day  rasagiline Tablet 1 milliGRAM(s) Oral daily  sertraline 100 milliGRAM(s) Oral daily  simvastatin 10 milliGRAM(s) Oral at bedtime  tamsulosin 0.4 milliGRAM(s) Oral at bedtime    PRN Inpatient Medications  acetaminophen   Tablet .. 650 milliGRAM(s) Oral every 6 hours PRN  ALPRAZolam 0.5 milliGRAM(s) Oral two times a day PRN  belladonna 16.2 mG/opium 30 mg Suppository 1 Suppository(s) Rectal every 6 hours PRN  senna 2 Tablet(s) Oral at bedtime PRN      REVIEW OF SYSTEMS  --------------------------------------------------------------------------------  General: no fever  CVS: no chest pain  RESP: no sob, no cough    MSK: no edema     VITALS/PHYSICAL EXAM  --------------------------------------------------------------------------------  T(C): 36.8 (03-26-19 @ 05:36), Max: 36.8 (03-26-19 @ 05:36)  HR: 84 (03-26-19 @ 05:36) (59 - 84)  BP: 153/80 (03-26-19 @ 05:36) (95/59 - 153/80)  RR: 20 (03-26-19 @ 05:36) (16 - 20)  SpO2: 99% (03-26-19 @ 05:36) (94% - 99%)  Wt(kg): --        03-25-19 @ 07:01  -  03-26-19 @ 07:00  --------------------------------------------------------  IN: 3020 mL / OUT: 1850 mL / NET: 1170 mL      Physical Exam:  	Gen: NAD  	HEENT: MMM  	Pulm: CTA B/L  	CV: S1S2  	Abd: Soft, +BS  	Ext: No LE edema B/L                      Neuro: Awake   	Skin: Warm and Dry   	BOB núñez    LABS/STUDIES  --------------------------------------------------------------------------------                Creatinine Trend:  SCr 1.27 [03-24 @ 08:46]  SCr 1.30 [03-23 @ 07:34]  SCr 1.44 [03-22 @ 06:51]  SCr 1.55 [03-21 @ 08:57]  SCr 1.97 [03-20 @ 07:15]    Urinalysis - [03-16-19 @ 04:02]      Color Red / Appearance Turbid / SG 1.029 / pH See Note      Gluc See Note / Ketone See Note  / Bili See Note / Urobili See Note       Blood See Note / Protein See Note / Leuk Est See Note / Nitrite See Note      RBC >50 / WBC 15 / Hyaline  / Gran  / Sq Epi  / Non Sq Epi  / Bacteria

## 2019-03-26 NOTE — PROGRESS NOTE ADULT - PROVIDER SPECIALTY LIST ADULT
Internal Medicine
Nephrology
Urology
Nephrology

## 2019-03-26 NOTE — PROGRESS NOTE ADULT - ASSESSMENT
78 year old male with history of parkinson's disease, prostate CA (s/p TURP x2 and radiation), HTN, HLD presenting to the ED with decreased urine output and suprapubic pain and hematuria. found to have OSMIN    OSMIN  peaked 3.34  Baseline Scr ? 1.5 (3/18)  OSMIN likely sec to  ATN   Núñez reinserted sc to retention  Renal US negative for obstruction  Ok to start lisinopril on discharge  renal function improving continue to monitor  avoid nephrotoxic agents.     Hyperkalemia  likely sec to RF  Serum K improved   low K diet  MOnitor serum K    Hematuria  f/u   s/p núñez reinsertion

## 2019-03-26 NOTE — PROGRESS NOTE ADULT - ASSESSMENT
79 y/o male PMHx Prostate CA s/p XRT 2010 presenting with hematuria and retention found to have OSMIN or unknown etiology. UCx negative.  No hydro, no evidence of obstruction on renal U/s    -Failed TOV, Arrieta replaced  -Home with Arrieta, outpatient TOV  -Should follow up in the office with Dr. Calle in 1 week  -[facility] [phone]   -Will sign off, please call if questions

## 2019-03-26 NOTE — PROGRESS NOTE ADULT - SUBJECTIVE AND OBJECTIVE BOX
Patient failed TOV yesterday, Arrieta replaced.  Urine remains clear yellow.    T(F): 98.2, Max: 98.2 (03-26-19 @ 05:36)  HR: 84  BP: 153/80  SpO2: 99%    OUT:    Indwelling Catheter - Urethral: 1850 mL    Gen NAD   Arrieta in place draining clear yellow urine

## 2019-03-28 ENCOUNTER — OUTPATIENT (OUTPATIENT)
Dept: OUTPATIENT SERVICES | Facility: HOSPITAL | Age: 79
LOS: 1 days | End: 2019-03-28
Payer: MEDICARE

## 2019-03-28 ENCOUNTER — APPOINTMENT (OUTPATIENT)
Dept: UROLOGY | Facility: CLINIC | Age: 79
End: 2019-03-28

## 2019-03-28 ENCOUNTER — APPOINTMENT (OUTPATIENT)
Dept: UROLOGY | Facility: CLINIC | Age: 79
End: 2019-03-28
Payer: MEDICARE

## 2019-03-28 PROCEDURE — 99213 OFFICE O/P EST LOW 20 MIN: CPT | Mod: 25

## 2019-03-28 PROCEDURE — 51700 IRRIGATION OF BLADDER: CPT

## 2019-04-01 NOTE — ADDENDUM
[FreeTextEntry1] : Entered by Leslie Cosby, acting as scribe for Dr. Kelly. \par \par The documentation recorded by the scribe accurately reflects the service I personally performed and the decisions made by me.\par

## 2019-04-01 NOTE — LETTER BODY
[FreeTextEntry1] : Piotr Mcdonnell MD. \par [address]\[place] [address]\par [phone]\par \par Dear Dr. Mcdonnell, \par \par Reason for visit: Prostate cancer status post radiation therapy. Gross hematuria.\par \par This is a 78 year-old man with history of prostate cancer s/p radiation therapy, presenting with acute urinary retention and gross hematuria referred for evaluation. He is usually evaluated by my colleague, Dr. Calle. Patient had developed urinary retention and was catheterized several times. He was found to have gross hematuria from catherization. Otherwise, patient is entirely asymptomatic. He denies any dysuria or urinary incontinence. The patient denies any aggravating or relieving factors. The patient denies any interference of function. All other review of systems are negative. He has no cancer in his family medical history. He has previous surgical history. Past medical history, family history and social history were inquired and were non contributory to current condition. The patient does not use tobacco or drink alcohol. Medications and allergies were reviewed.\par \par On examination, the patient is an older -appearing wheelchair- bound gentleman in no acute distress. He is alert and oriented follows commands. He has normal mood and affect. He is normocephalic. Neck is supple. Respirations are unlabored. His abdomen is soft and nontender. Bladder is nonpalpable. No CVA tenderness. Neurologically he is grossly intact. No peripheral edema. Skin without gross abnormality. He has a catheter draining dark red urine. \par \par Patient underwent continuous bladder irrigation today. His bladder was irrigated with 3L of saline to clear. \par \par Assessment: Prostate cancer status post radiation therapy. Gross hematuria.\par \par I counseled the patient on the various etiologies of hematuria. I counseled the patient about hematuria. He will obtain CBI today to relieve his condition. I counseled the patient about the procedure. His bladder was irrigated with saline to clear today. Patient will follow up with Dr. Calle. Risks and alternatives were discussed. I answered the patient questions. The patient will follow-up as directed and will contact me with any questions or concerns. Thank you for the opportunity to participate in the care of this patient. I'll keep you updated on his progress.\par \par Plan: CBI today. Follow up with Dr. Calle. \par \par \par

## 2019-04-03 DIAGNOSIS — C61 MALIGNANT NEOPLASM OF PROSTATE: ICD-10-CM

## 2019-04-03 DIAGNOSIS — N40.0 BENIGN PROSTATIC HYPERPLASIA WITHOUT LOWER URINARY TRACT SYMPTOMS: ICD-10-CM

## 2019-04-03 DIAGNOSIS — R33.9 RETENTION OF URINE, UNSPECIFIED: ICD-10-CM

## 2019-04-04 ENCOUNTER — APPOINTMENT (OUTPATIENT)
Dept: UROLOGY | Facility: CLINIC | Age: 79
End: 2019-04-04
Payer: MEDICARE

## 2019-04-04 DIAGNOSIS — N36.8 OTHER SPECIFIED DISORDERS OF URETHRA: ICD-10-CM

## 2019-04-04 DIAGNOSIS — R33.9 RETENTION OF URINE, UNSPECIFIED: ICD-10-CM

## 2019-04-04 PROCEDURE — 99213 OFFICE O/P EST LOW 20 MIN: CPT

## 2019-04-04 NOTE — HISTORY OF PRESENT ILLNESS
[FreeTextEntry1] : 78 year old male presents for f/u for urinary retention \par H/o prostate CA treated with XRT.\par Pt had bladder irrigated by Dr. Kelly last week\par Within the last week, he's been having falls, likely due to his progressively worsening Parkinsonism.\par Advised pt to stay with the indwelling catheter and have Edgardo Saunders RN, for routine catheter changes. Last change in hospital 3/25/19. Nicky's # is [phone]. \par

## 2019-04-04 NOTE — ASSESSMENT
[FreeTextEntry1] : H/o prostate CA treated with XRT.\par Pt had bladder irrigated by Dr. Kelly last week\par Within the last week, he's been having falls, likely due to his progressively worsening Parkinsonism.\par Advised pt to stay with the indwelling catheter and have home care RN for routine catheter changes.

## 2019-04-10 RX ORDER — LIDOCAINE HYDROCHLORIDE 20 MG/ML
2 JELLY TOPICAL
Qty: 2 | Refills: 0 | Status: ACTIVE | COMMUNITY
Start: 2019-04-04 | End: 1900-01-01

## 2019-04-15 ENCOUNTER — CLINICAL ADVICE (OUTPATIENT)
Age: 79
End: 2019-04-15

## 2019-04-15 ENCOUNTER — INPATIENT (INPATIENT)
Facility: HOSPITAL | Age: 79
LOS: 8 days | Discharge: HOME | DRG: 698 | End: 2019-04-24
Attending: HOSPITALIST | Admitting: HOSPITALIST
Payer: MEDICARE

## 2019-04-15 VITALS
OXYGEN SATURATION: 98 % | HEART RATE: 84 BPM | TEMPERATURE: 98 F | SYSTOLIC BLOOD PRESSURE: 117 MMHG | DIASTOLIC BLOOD PRESSURE: 64 MMHG | RESPIRATION RATE: 20 BRPM

## 2019-04-15 DIAGNOSIS — F32.9 MAJOR DEPRESSIVE DISORDER, SINGLE EPISODE, UNSPECIFIED: ICD-10-CM

## 2019-04-15 DIAGNOSIS — G20 PARKINSON'S DISEASE: ICD-10-CM

## 2019-04-15 DIAGNOSIS — R33.9 RETENTION OF URINE, UNSPECIFIED: ICD-10-CM

## 2019-04-15 DIAGNOSIS — Z29.9 ENCOUNTER FOR PROPHYLACTIC MEASURES, UNSPECIFIED: ICD-10-CM

## 2019-04-15 DIAGNOSIS — M10.9 GOUT, UNSPECIFIED: ICD-10-CM

## 2019-04-15 DIAGNOSIS — D47.3 ESSENTIAL (HEMORRHAGIC) THROMBOCYTHEMIA: ICD-10-CM

## 2019-04-15 DIAGNOSIS — R55 SYNCOPE AND COLLAPSE: ICD-10-CM

## 2019-04-15 DIAGNOSIS — R19.7 DIARRHEA, UNSPECIFIED: ICD-10-CM

## 2019-04-15 DIAGNOSIS — E87.2 ACIDOSIS: ICD-10-CM

## 2019-04-15 DIAGNOSIS — N18.3 CHRONIC KIDNEY DISEASE, STAGE 3 (MODERATE): ICD-10-CM

## 2019-04-15 DIAGNOSIS — R65.10 SYSTEMIC INFLAMMATORY RESPONSE SYNDROME (SIRS) OF NON-INFECTIOUS ORIGIN WITHOUT ACUTE ORGAN DYSFUNCTION: ICD-10-CM

## 2019-04-15 LAB
ALBUMIN SERPL ELPH-MCNC: 3.5 G/DL — SIGNIFICANT CHANGE UP (ref 3.3–5)
ALP SERPL-CCNC: 105 U/L — SIGNIFICANT CHANGE UP (ref 40–120)
ALT FLD-CCNC: <5 U/L — LOW (ref 10–45)
ANION GAP SERPL CALC-SCNC: 11 MMOL/L — SIGNIFICANT CHANGE UP (ref 5–17)
APPEARANCE UR: ABNORMAL
APPEARANCE UR: ABNORMAL
APTT BLD: 29.6 SEC — SIGNIFICANT CHANGE UP (ref 27.5–36.3)
AST SERPL-CCNC: 17 U/L — SIGNIFICANT CHANGE UP (ref 10–40)
BACTERIA # UR AUTO: NEGATIVE — SIGNIFICANT CHANGE UP
BACTERIA # UR AUTO: NEGATIVE — SIGNIFICANT CHANGE UP
BASOPHILS # BLD AUTO: 0.1 K/UL — SIGNIFICANT CHANGE UP (ref 0–0.2)
BASOPHILS NFR BLD AUTO: 0.8 % — SIGNIFICANT CHANGE UP (ref 0–2)
BILIRUB SERPL-MCNC: 0.3 MG/DL — SIGNIFICANT CHANGE UP (ref 0.2–1.2)
BILIRUB UR-MCNC: NEGATIVE — SIGNIFICANT CHANGE UP
BILIRUB UR-MCNC: NEGATIVE — SIGNIFICANT CHANGE UP
BUN SERPL-MCNC: 27 MG/DL — HIGH (ref 7–23)
CALCIUM SERPL-MCNC: 8.8 MG/DL — SIGNIFICANT CHANGE UP (ref 8.4–10.5)
CHLORIDE SERPL-SCNC: 108 MMOL/L — SIGNIFICANT CHANGE UP (ref 96–108)
CO2 SERPL-SCNC: 20 MMOL/L — LOW (ref 22–31)
COLOR SPEC: SIGNIFICANT CHANGE UP
COLOR SPEC: SIGNIFICANT CHANGE UP
CREAT SERPL-MCNC: 1.74 MG/DL — HIGH (ref 0.5–1.3)
DIFF PNL FLD: ABNORMAL
DIFF PNL FLD: ABNORMAL
EOSINOPHIL # BLD AUTO: 0.3 K/UL — SIGNIFICANT CHANGE UP (ref 0–0.5)
EOSINOPHIL NFR BLD AUTO: 1.6 % — SIGNIFICANT CHANGE UP (ref 0–6)
EPI CELLS # UR: 0 /HPF — SIGNIFICANT CHANGE UP
EPI CELLS # UR: 0 /HPF — SIGNIFICANT CHANGE UP
GAS PNL BLDV: SIGNIFICANT CHANGE UP
GLUCOSE SERPL-MCNC: 115 MG/DL — HIGH (ref 70–99)
GLUCOSE UR QL: NEGATIVE — SIGNIFICANT CHANGE UP
GLUCOSE UR QL: NEGATIVE — SIGNIFICANT CHANGE UP
HCT VFR BLD CALC: 31 % — LOW (ref 39–50)
HGB BLD-MCNC: 10 G/DL — LOW (ref 13–17)
HYALINE CASTS # UR AUTO: 0 /LPF — SIGNIFICANT CHANGE UP (ref 0–2)
HYALINE CASTS # UR AUTO: 0 /LPF — SIGNIFICANT CHANGE UP (ref 0–2)
INR BLD: 1.19 RATIO — HIGH (ref 0.88–1.16)
KETONES UR-MCNC: NEGATIVE — SIGNIFICANT CHANGE UP
KETONES UR-MCNC: SIGNIFICANT CHANGE UP
LEUKOCYTE ESTERASE UR-ACNC: ABNORMAL
LEUKOCYTE ESTERASE UR-ACNC: ABNORMAL
LYMPHOCYTES # BLD AUTO: 1.7 K/UL — SIGNIFICANT CHANGE UP (ref 1–3.3)
LYMPHOCYTES # BLD AUTO: 10.5 % — LOW (ref 13–44)
MCHC RBC-ENTMCNC: 29.4 PG — SIGNIFICANT CHANGE UP (ref 27–34)
MCHC RBC-ENTMCNC: 32.4 GM/DL — SIGNIFICANT CHANGE UP (ref 32–36)
MCV RBC AUTO: 90.6 FL — SIGNIFICANT CHANGE UP (ref 80–100)
MONOCYTES # BLD AUTO: 1.3 K/UL — HIGH (ref 0–0.9)
MONOCYTES NFR BLD AUTO: 7.8 % — SIGNIFICANT CHANGE UP (ref 2–14)
NEUTROPHILS # BLD AUTO: 12.9 K/UL — HIGH (ref 1.8–7.4)
NEUTROPHILS NFR BLD AUTO: 79.4 % — HIGH (ref 43–77)
NITRITE UR-MCNC: NEGATIVE — SIGNIFICANT CHANGE UP
NITRITE UR-MCNC: NEGATIVE — SIGNIFICANT CHANGE UP
NT-PROBNP SERPL-SCNC: 870 PG/ML — HIGH (ref 0–300)
PH UR: 6 — SIGNIFICANT CHANGE UP (ref 5–8)
PH UR: 6 — SIGNIFICANT CHANGE UP (ref 5–8)
PLATELET # BLD AUTO: 485 K/UL — HIGH (ref 150–400)
POTASSIUM SERPL-MCNC: 4.2 MMOL/L — SIGNIFICANT CHANGE UP (ref 3.5–5.3)
POTASSIUM SERPL-SCNC: 4.2 MMOL/L — SIGNIFICANT CHANGE UP (ref 3.5–5.3)
PROT SERPL-MCNC: 6.9 G/DL — SIGNIFICANT CHANGE UP (ref 6–8.3)
PROT UR-MCNC: ABNORMAL
PROT UR-MCNC: ABNORMAL
PROTHROM AB SERPL-ACNC: 13.6 SEC — HIGH (ref 10–12.9)
RAPID RVP RESULT: SIGNIFICANT CHANGE UP
RBC # BLD: 3.42 M/UL — LOW (ref 4.2–5.8)
RBC # FLD: 13.4 % — SIGNIFICANT CHANGE UP (ref 10.3–14.5)
RBC CASTS # UR COMP ASSIST: 15 /HPF — HIGH (ref 0–4)
RBC CASTS # UR COMP ASSIST: 8 /HPF — HIGH (ref 0–4)
SODIUM SERPL-SCNC: 139 MMOL/L — SIGNIFICANT CHANGE UP (ref 135–145)
SP GR SPEC: 1.02 — SIGNIFICANT CHANGE UP (ref 1.01–1.02)
SP GR SPEC: 1.03 — HIGH (ref 1.01–1.02)
TROPONIN T, HIGH SENSITIVITY RESULT: 19 NG/L — SIGNIFICANT CHANGE UP (ref 0–51)
TROPONIN T, HIGH SENSITIVITY RESULT: 20 NG/L — SIGNIFICANT CHANGE UP (ref 0–51)
UROBILINOGEN FLD QL: NEGATIVE — SIGNIFICANT CHANGE UP
UROBILINOGEN FLD QL: NEGATIVE — SIGNIFICANT CHANGE UP
WBC # BLD: 16.2 K/UL — HIGH (ref 3.8–10.5)
WBC # FLD AUTO: 16.2 K/UL — HIGH (ref 3.8–10.5)
WBC UR QL: 0 /HPF — SIGNIFICANT CHANGE UP (ref 0–5)
WBC UR QL: 0 /HPF — SIGNIFICANT CHANGE UP (ref 0–5)

## 2019-04-15 PROCEDURE — 99223 1ST HOSP IP/OBS HIGH 75: CPT

## 2019-04-15 PROCEDURE — 99285 EMERGENCY DEPT VISIT HI MDM: CPT | Mod: GC

## 2019-04-15 PROCEDURE — 71045 X-RAY EXAM CHEST 1 VIEW: CPT | Mod: 26

## 2019-04-15 RX ORDER — ASPIRIN/CALCIUM CARB/MAGNESIUM 324 MG
81 TABLET ORAL DAILY
Qty: 0 | Refills: 0 | Status: DISCONTINUED | OUTPATIENT
Start: 2019-04-15 | End: 2019-04-17

## 2019-04-15 RX ORDER — SERTRALINE 25 MG/1
100 TABLET, FILM COATED ORAL DAILY
Qty: 0 | Refills: 0 | Status: DISCONTINUED | OUTPATIENT
Start: 2019-04-15 | End: 2019-04-24

## 2019-04-15 RX ORDER — RASAGILINE 0.5 MG/1
1 TABLET ORAL DAILY
Qty: 0 | Refills: 0 | Status: DISCONTINUED | OUTPATIENT
Start: 2019-04-15 | End: 2019-04-24

## 2019-04-15 RX ORDER — FAMOTIDINE 10 MG/ML
20 INJECTION INTRAVENOUS DAILY
Qty: 0 | Refills: 0 | Status: DISCONTINUED | OUTPATIENT
Start: 2019-04-15 | End: 2019-04-24

## 2019-04-15 RX ORDER — ACETAMINOPHEN 500 MG
650 TABLET ORAL ONCE
Qty: 0 | Refills: 0 | Status: COMPLETED | OUTPATIENT
Start: 2019-04-15 | End: 2019-04-15

## 2019-04-15 RX ORDER — SIMVASTATIN 20 MG/1
10 TABLET, FILM COATED ORAL AT BEDTIME
Qty: 0 | Refills: 0 | Status: DISCONTINUED | OUTPATIENT
Start: 2019-04-15 | End: 2019-04-24

## 2019-04-15 RX ORDER — SODIUM CHLORIDE 9 MG/ML
1000 INJECTION INTRAMUSCULAR; INTRAVENOUS; SUBCUTANEOUS ONCE
Qty: 0 | Refills: 0 | Status: COMPLETED | OUTPATIENT
Start: 2019-04-15 | End: 2019-04-15

## 2019-04-15 RX ORDER — MORPHINE SULFATE 50 MG/1
2 CAPSULE, EXTENDED RELEASE ORAL ONCE
Qty: 0 | Refills: 0 | Status: DISCONTINUED | OUTPATIENT
Start: 2019-04-15 | End: 2019-04-15

## 2019-04-15 RX ORDER — ALLOPURINOL 300 MG
100 TABLET ORAL DAILY
Qty: 0 | Refills: 0 | Status: DISCONTINUED | OUTPATIENT
Start: 2019-04-15 | End: 2019-04-24

## 2019-04-15 RX ORDER — LIDOCAINE 4 G/100G
1 CREAM TOPICAL ONCE
Qty: 0 | Refills: 0 | Status: COMPLETED | OUTPATIENT
Start: 2019-04-15 | End: 2019-04-16

## 2019-04-15 RX ORDER — ALPRAZOLAM 0.25 MG
0.25 TABLET ORAL DAILY
Qty: 0 | Refills: 0 | Status: DISCONTINUED | OUTPATIENT
Start: 2019-04-15 | End: 2019-04-20

## 2019-04-15 RX ORDER — ERYTHROMYCIN BASE 5 MG/GRAM
1 OINTMENT (GRAM) OPHTHALMIC (EYE)
Qty: 0 | Refills: 0 | Status: DISCONTINUED | OUTPATIENT
Start: 2019-04-15 | End: 2019-04-24

## 2019-04-15 RX ORDER — AMANTADINE HCL 100 MG
100 CAPSULE ORAL
Qty: 0 | Refills: 0 | Status: DISCONTINUED | OUTPATIENT
Start: 2019-04-15 | End: 2019-04-24

## 2019-04-15 RX ADMIN — SODIUM CHLORIDE 500 MILLILITER(S): 9 INJECTION INTRAMUSCULAR; INTRAVENOUS; SUBCUTANEOUS at 21:15

## 2019-04-15 RX ADMIN — RASAGILINE 1 MILLIGRAM(S): 0.5 TABLET ORAL at 23:30

## 2019-04-15 RX ADMIN — SODIUM CHLORIDE 1000 MILLILITER(S): 9 INJECTION INTRAMUSCULAR; INTRAVENOUS; SUBCUTANEOUS at 14:01

## 2019-04-15 RX ADMIN — Medication 1 APPLICATION(S): at 23:30

## 2019-04-15 RX ADMIN — SIMVASTATIN 10 MILLIGRAM(S): 20 TABLET, FILM COATED ORAL at 23:30

## 2019-04-15 NOTE — H&P ADULT - ASSESSMENT
78M pmhx of HTN, HLD, chronic indwelling núñez after admission last month with multiple issues of núñez clogging and retention, Parkinson's disease admitted with repeat urinary retention/núñez clogging and SIRS with unclear source with bacterial conjunctivitis.

## 2019-04-15 NOTE — H&P ADULT - PROBLEM SELECTOR PLAN 3
Likely due to diarrhea, continue to monitor and stool studies as below.    #anemia of chronic disease  -Mild hematuria, likely dehydrated and falsely elevated. Will repeat CBC in am  -Transfuse < 7

## 2019-04-15 NOTE — H&P ADULT - HISTORY OF PRESENT ILLNESS
78M pmhx of HTN, HLD, chronic indwelling núñez after admission last month with multiple issues of núñez clogging and retention, Parkinson's disease who presents from home after calling EMS for clogged núñez and pain. When the EMS arrived he was almost dropped from the stretcher (was not dropped) but then got excited and passed out for several seconds and his wife noticed some shaking movements.  Denies tongue biting or bowel incontinence  He remembers the event. He denies any recent illness, no fevers, chills, nausea, vomiting but did have diarrhea this morning.  He is still having some suprapubic and penile discomfort.     In ED:  T 100.2 HR 87 /87 RR 16 O2 100% He got 1L NS and núñez was changed and he was started on CBI.

## 2019-04-15 NOTE — H&P ADULT - NSHPLABSRESULTS_GEN_ALL_CORE
04-15    139  |  108  |  27<H>  ----------------------------<  115<H>  4.2   |  20<L>  |  1.74<H>    Ca    8.8      15 Apr 2019 13:00    TPro  6.9  /  Alb  3.5  /  TBili  0.3  /  DBili  x   /  AST  17  /  ALT  <5<L>  /  AlkPhos  105  04-15                            10.0   16.2  )-----------( 485      ( 15 Apr 2019 13:00 )             31.0             LIVER FUNCTIONS - ( 15 Apr 2019 13:00 )  Alb: 3.5 g/dL / Pro: 6.9 g/dL / ALK PHOS: 105 U/L / ALT: <5 U/L / AST: 17 U/L / GGT: x             PT/INR - ( 15 Apr 2019 13:00 )   PT: 13.6 sec;   INR: 1.19 ratio         PTT - ( 15 Apr 2019 13:00 )  PTT:29.6 sec    Urinalysis Basic - ( 15 Apr 2019 14:09 )    Color: BROWN / Appearance: Slightly Turbid / S.016 / pH: x  Gluc: x / Ketone: Negative  / Bili: Negative / Urobili: Negative   Blood: x / Protein: 300 mg/dL / Nitrite: Negative   Leuk Esterase: Large / RBC: 8 /hpf / WBC 0 /HPF   Sq Epi: x / Non Sq Epi: 0 /hpf / Bacteria: Negative      cxr reviewed by me  Impression:    The heart is normal in size. The lungs are clear. Degenerative changes of   the thoracic spine. No pleural effusion. No pneumothorax.

## 2019-04-15 NOTE — H&P ADULT - PROBLEM SELECTOR PLAN 2
CXR negative, UA negative, check C. Diff and RVP    #bacterial conjunctivitis  Erythromycin ointment- not cause of sepsis

## 2019-04-15 NOTE — ED ADULT NURSE NOTE - NS ED NURSE REPORT GIVEN TO FT
Bedside report given to on coming nurse Fanny thomas RN. Understands pmh, medications given and plan of care for patient. Patient in stable condition, vital signs updated, has no complaints at this time and has been updated on care plan. Explained to patient that it is change of shift and new nurse is taking over, pt verbalized understanding.

## 2019-04-15 NOTE — ED PROVIDER NOTE - CARE PLAN
Principal Discharge DX:	Syncope Principal Discharge DX:	Syncope  Secondary Diagnosis:	OSMIN (acute kidney injury)  Secondary Diagnosis:	Dehydration, mild

## 2019-04-15 NOTE — H&P ADULT - PROBLEM SELECTOR PLAN 1
Has chronic núñez since March admission- he has hx of prostate cancer s/p radiation and TURPx 2.    -Now is on irrigation but still having leaking around the núñez insertion site and suprapubic pain.  -Uro consulted appreciate recs  -Was recently D/C'd off Flomax and told he will likely need life long núñez.    #Syncope  Sounds more like vasovagal from urinary retention and almost falling off of stretcher.  Low suspicion for seizure. Can monitor on tele for 24 hrs, trops negative x 1 will order repeat now.  Check EKG and continue ASA and get TTE.

## 2019-04-15 NOTE — ED PROVIDER NOTE - PROGRESS NOTE DETAILS
complex cystic lesion adjacent to liver noted during ultrasound review on 4/19/19.  I contacted the medicine PA on [employer]mont[employer] and informed her of findings.  She reports that she will order an official RUQ ultrasound to better assess lesion.  -CORBIN Head[facility] complex cystic lesion adjacent to liver noted during ultrasound review on 4/19/19.  I contacted the medicine KRISTINA Patel on 4mont[facility] and informed her of findings.  She reports that she will order an official RUQ ultrasound to better assess lesion.  -Shamir Willis PA-C

## 2019-04-15 NOTE — ED PROVIDER NOTE - CLINICAL SUMMARY MEDICAL DECISION MAKING FREE TEXT BOX
Angel: episode of unresponsiveness. Bleeding núñez ?obstructed, Diarrhea, ?dehydrated. Infection possible. Will evaluate for this and cardiac. Sz less likely.

## 2019-04-15 NOTE — ED ADULT NURSE NOTE - OBJECTIVE STATEMENT
79 y/o male BIBA for multiple complaints. Per EMS they arrived after being called for blood in the [place] and 2 episodes of diarrhea yesterday. Per pts wife he has had 3 Arrieta's the most recent one placed 04/04/19. Upon arrival pt became unresponsive for one minute with no noted pulse per EMS. PMH of prostate CA and pancreatitis. Denies chest pain, sob, ha, n/v/d, abdominal pain, f/c. A&Ox1 disorientated to date and situation, low grade fever, skin warm dry and intact, MAEx4, lungs CTA, abd soft nondistended, blood noted around Meenakshi, MD aware. Pt resting comfortably with no complaints at this time. Patient's bed in the lowest position, explained plan of care to patient and family members. Will continue to reassess.

## 2019-04-15 NOTE — ED PROVIDER NOTE - OBJECTIVE STATEMENT
78yoM hx of hld, htn, depression, with chronic núñez pw episode of passing out while calling EMS. Patient had diarrhea last night, a few episodes, then this morning had minimal drainage from núñez with some pain, suprapubic, throbbing, worse with palpation, not improving, so called nurse for change of núñez. When they transferred him to the chair, patient became unresponsive with eyes rolling. Lasted less than 1 min, then patient woke up and within a few minutes was back to baseline per family. no fevers, chills, n/v, dizziness,  numbness, cough, SOB, chest pain, , paresthesias.

## 2019-04-15 NOTE — ED PROVIDER NOTE - ATTENDING CONTRIBUTION TO CARE
I performed a history and physical exam of the patient and discussed their management with the resident and /or advanced care provider. I reviewed the resident and /or ACP's note and agree with the documented findings and plan of care. My medical decison making and observations are found above.  Abd soft, coreiod movement (old)

## 2019-04-15 NOTE — H&P ADULT - PROBLEM SELECTOR PLAN 4
Hold nephrotoxic agents, continue to monitor and urology consult for obstruction with chronic núñez.

## 2019-04-15 NOTE — H&P ADULT - NSHPPHYSICALEXAM_GEN_ALL_CORE
Vital Signs Last 24 Hrs  T(C): 37.9 (15 Apr 2019 12:38), Max: 37.9 (15 Apr 2019 12:38)  T(F): 100.2 (15 Apr 2019 12:38), Max: 100.2 (15 Apr 2019 12:38)  HR: 67 (15 Apr 2019 15:17) (67 - 87)  BP: 123/78 (15 Apr 2019 15:17) (117/64 - 135/66)  BP(mean): --  RR: 16 (15 Apr 2019 15:17) (16 - 20)  SpO2: 97% (15 Apr 2019 15:17) (97% - 100%)

## 2019-04-16 LAB
ANION GAP SERPL CALC-SCNC: 13 MMOL/L — SIGNIFICANT CHANGE UP (ref 5–17)
BUN SERPL-MCNC: 21 MG/DL — SIGNIFICANT CHANGE UP (ref 7–23)
CALCIUM SERPL-MCNC: 8.8 MG/DL — SIGNIFICANT CHANGE UP (ref 8.4–10.5)
CHLORIDE SERPL-SCNC: 108 MMOL/L — SIGNIFICANT CHANGE UP (ref 96–108)
CO2 SERPL-SCNC: 19 MMOL/L — LOW (ref 22–31)
CREAT SERPL-MCNC: 1.22 MG/DL — SIGNIFICANT CHANGE UP (ref 0.5–1.3)
GLUCOSE SERPL-MCNC: 107 MG/DL — HIGH (ref 70–99)
HCT VFR BLD CALC: 29.1 % — LOW (ref 39–50)
HGB BLD-MCNC: 8.9 G/DL — LOW (ref 13–17)
MAGNESIUM SERPL-MCNC: 1.8 MG/DL — SIGNIFICANT CHANGE UP (ref 1.6–2.6)
MCHC RBC-ENTMCNC: 28.4 PG — SIGNIFICANT CHANGE UP (ref 27–34)
MCHC RBC-ENTMCNC: 30.6 GM/DL — LOW (ref 32–36)
MCV RBC AUTO: 93 FL — SIGNIFICANT CHANGE UP (ref 80–100)
PLATELET # BLD AUTO: 438 K/UL — HIGH (ref 150–400)
POTASSIUM SERPL-MCNC: 4 MMOL/L — SIGNIFICANT CHANGE UP (ref 3.5–5.3)
POTASSIUM SERPL-SCNC: 4 MMOL/L — SIGNIFICANT CHANGE UP (ref 3.5–5.3)
RBC # BLD: 3.13 M/UL — LOW (ref 4.2–5.8)
RBC # FLD: 14.3 % — SIGNIFICANT CHANGE UP (ref 10.3–14.5)
SODIUM SERPL-SCNC: 140 MMOL/L — SIGNIFICANT CHANGE UP (ref 135–145)
WBC # BLD: 18.76 K/UL — HIGH (ref 3.8–10.5)
WBC # FLD AUTO: 18.76 K/UL — HIGH (ref 3.8–10.5)

## 2019-04-16 RX ORDER — CARBIDOPA AND LEVODOPA 25; 100 MG/1; MG/1
2 TABLET ORAL EVERY 6 HOURS
Qty: 0 | Refills: 0 | Status: DISCONTINUED | OUTPATIENT
Start: 2019-04-16 | End: 2019-04-24

## 2019-04-16 RX ADMIN — Medication 100 MILLIGRAM(S): at 06:08

## 2019-04-16 RX ADMIN — Medication 81 MILLIGRAM(S): at 12:22

## 2019-04-16 RX ADMIN — Medication 1 APPLICATION(S): at 06:07

## 2019-04-16 RX ADMIN — Medication 100 MILLIGRAM(S): at 12:22

## 2019-04-16 RX ADMIN — SERTRALINE 100 MILLIGRAM(S): 25 TABLET, FILM COATED ORAL at 14:35

## 2019-04-16 RX ADMIN — RASAGILINE 1 MILLIGRAM(S): 0.5 TABLET ORAL at 14:35

## 2019-04-16 RX ADMIN — FAMOTIDINE 20 MILLIGRAM(S): 10 INJECTION INTRAVENOUS at 12:22

## 2019-04-16 RX ADMIN — Medication 100 MILLIGRAM(S): at 18:01

## 2019-04-16 RX ADMIN — CARBIDOPA AND LEVODOPA 2 CAPSULE(S): 25; 100 TABLET ORAL at 23:13

## 2019-04-16 RX ADMIN — SIMVASTATIN 10 MILLIGRAM(S): 20 TABLET, FILM COATED ORAL at 23:13

## 2019-04-16 RX ADMIN — LIDOCAINE 1 APPLICATION(S): 4 CREAM TOPICAL at 12:23

## 2019-04-16 RX ADMIN — Medication 1 APPLICATION(S): at 18:01

## 2019-04-16 NOTE — PROGRESS NOTE ADULT - ASSESSMENT
78 M PMHx of HTN, HLD, chronic indwelling núñez after admission last month with multiple issues of núñez clogging and retention, Parkinson's disease admitted with repeat urinary retention/núñez clogging and SIRS with unclear source with bacterial conjunctivitis.

## 2019-04-17 ENCOUNTER — TRANSCRIPTION ENCOUNTER (OUTPATIENT)
Age: 79
End: 2019-04-17

## 2019-04-17 LAB
-  AMIKACIN: SIGNIFICANT CHANGE UP
-  AMPICILLIN/SULBACTAM: SIGNIFICANT CHANGE UP
-  AMPICILLIN: SIGNIFICANT CHANGE UP
-  AZTREONAM: SIGNIFICANT CHANGE UP
-  CEFAZOLIN: SIGNIFICANT CHANGE UP
-  CEFEPIME: SIGNIFICANT CHANGE UP
-  CEFOXITIN: SIGNIFICANT CHANGE UP
-  CEFTRIAXONE: SIGNIFICANT CHANGE UP
-  CIPROFLOXACIN: SIGNIFICANT CHANGE UP
-  ERTAPENEM: SIGNIFICANT CHANGE UP
-  GENTAMICIN: SIGNIFICANT CHANGE UP
-  IMIPENEM: SIGNIFICANT CHANGE UP
-  LEVOFLOXACIN: SIGNIFICANT CHANGE UP
-  MEROPENEM: SIGNIFICANT CHANGE UP
-  NITROFURANTOIN: SIGNIFICANT CHANGE UP
-  PIPERACILLIN/TAZOBACTAM: SIGNIFICANT CHANGE UP
-  TIGECYCLINE: SIGNIFICANT CHANGE UP
-  TOBRAMYCIN: SIGNIFICANT CHANGE UP
-  TRIMETHOPRIM/SULFAMETHOXAZOLE: SIGNIFICANT CHANGE UP
ANION GAP SERPL CALC-SCNC: 15 MMOL/L — SIGNIFICANT CHANGE UP (ref 5–17)
BUN SERPL-MCNC: 16 MG/DL — SIGNIFICANT CHANGE UP (ref 7–23)
CALCIUM SERPL-MCNC: 8.9 MG/DL — SIGNIFICANT CHANGE UP (ref 8.4–10.5)
CHLORIDE SERPL-SCNC: 105 MMOL/L — SIGNIFICANT CHANGE UP (ref 96–108)
CO2 SERPL-SCNC: 21 MMOL/L — LOW (ref 22–31)
CREAT SERPL-MCNC: 1.09 MG/DL — SIGNIFICANT CHANGE UP (ref 0.5–1.3)
CULTURE RESULTS: SIGNIFICANT CHANGE UP
GLUCOSE SERPL-MCNC: 84 MG/DL — SIGNIFICANT CHANGE UP (ref 70–99)
HCT VFR BLD CALC: 30 % — LOW (ref 39–50)
HGB BLD-MCNC: 9.1 G/DL — LOW (ref 13–17)
MCHC RBC-ENTMCNC: 27.8 PG — SIGNIFICANT CHANGE UP (ref 27–34)
MCHC RBC-ENTMCNC: 30.3 GM/DL — LOW (ref 32–36)
MCV RBC AUTO: 91.7 FL — SIGNIFICANT CHANGE UP (ref 80–100)
METHOD TYPE: SIGNIFICANT CHANGE UP
ORGANISM # SPEC MICROSCOPIC CNT: SIGNIFICANT CHANGE UP
ORGANISM # SPEC MICROSCOPIC CNT: SIGNIFICANT CHANGE UP
PLATELET # BLD AUTO: 462 K/UL — HIGH (ref 150–400)
POTASSIUM SERPL-MCNC: 3.9 MMOL/L — SIGNIFICANT CHANGE UP (ref 3.5–5.3)
POTASSIUM SERPL-SCNC: 3.9 MMOL/L — SIGNIFICANT CHANGE UP (ref 3.5–5.3)
RBC # BLD: 3.27 M/UL — LOW (ref 4.2–5.8)
RBC # FLD: 14.3 % — SIGNIFICANT CHANGE UP (ref 10.3–14.5)
SODIUM SERPL-SCNC: 141 MMOL/L — SIGNIFICANT CHANGE UP (ref 135–145)
SPECIMEN SOURCE: SIGNIFICANT CHANGE UP
WBC # BLD: 18.2 K/UL — HIGH (ref 3.8–10.5)
WBC # FLD AUTO: 18.2 K/UL — HIGH (ref 3.8–10.5)

## 2019-04-17 PROCEDURE — 93010 ELECTROCARDIOGRAM REPORT: CPT

## 2019-04-17 RX ORDER — LIDOCAINE 4 G/100G
1 CREAM TOPICAL
Qty: 0 | Refills: 0 | Status: DISCONTINUED | OUTPATIENT
Start: 2019-04-17 | End: 2019-04-24

## 2019-04-17 RX ADMIN — SIMVASTATIN 10 MILLIGRAM(S): 20 TABLET, FILM COATED ORAL at 23:01

## 2019-04-17 RX ADMIN — Medication 81 MILLIGRAM(S): at 12:38

## 2019-04-17 RX ADMIN — Medication 1 APPLICATION(S): at 17:52

## 2019-04-17 RX ADMIN — CARBIDOPA AND LEVODOPA 2 CAPSULE(S): 25; 100 TABLET ORAL at 05:51

## 2019-04-17 RX ADMIN — FAMOTIDINE 20 MILLIGRAM(S): 10 INJECTION INTRAVENOUS at 12:38

## 2019-04-17 RX ADMIN — Medication 100 MILLIGRAM(S): at 17:52

## 2019-04-17 RX ADMIN — Medication 100 MILLIGRAM(S): at 12:38

## 2019-04-17 RX ADMIN — Medication 100 MILLIGRAM(S): at 05:52

## 2019-04-17 RX ADMIN — CARBIDOPA AND LEVODOPA 2 CAPSULE(S): 25; 100 TABLET ORAL at 12:40

## 2019-04-17 RX ADMIN — CARBIDOPA AND LEVODOPA 2 CAPSULE(S): 25; 100 TABLET ORAL at 23:01

## 2019-04-17 RX ADMIN — Medication 1 APPLICATION(S): at 05:52

## 2019-04-17 RX ADMIN — CARBIDOPA AND LEVODOPA 2 CAPSULE(S): 25; 100 TABLET ORAL at 17:52

## 2019-04-17 RX ADMIN — SERTRALINE 100 MILLIGRAM(S): 25 TABLET, FILM COATED ORAL at 12:38

## 2019-04-17 RX ADMIN — RASAGILINE 1 MILLIGRAM(S): 0.5 TABLET ORAL at 12:38

## 2019-04-17 NOTE — DISCHARGE NOTE PROVIDER - PROVIDER TOKENS
PROVIDER:[TOKEN:[[id number]]],PROVIDER:[TOKEN:[[id number]]],PROVIDER:[TOKEN:[[id number]]]

## 2019-04-17 NOTE — PHYSICAL THERAPY INITIAL EVALUATION ADULT - CRITERIA FOR SKILLED THERAPEUTIC INTERVENTIONS
risk reduction/prevention/anticipated discharge recommendation/impairments found/predicted duration of therapy intervention/functional limitations in following categories/therapy frequency/anticipated equipment needs at discharge

## 2019-04-17 NOTE — PHYSICAL THERAPY INITIAL EVALUATION ADULT - THERAPY FREQUENCY, PT EVAL
Placed an order for the concussion  to help coordinate this  - there are other services she would benefit from in addition to Neurology    Violet Humphreys MD    3-5x/week

## 2019-04-17 NOTE — PHYSICAL THERAPY INITIAL EVALUATION ADULT - ADDITIONAL COMMENTS
contd from above: but then got excited and passed out for several seconds and his wife noticed some shaking movements.  Denies tongue biting or bowel incontinence  He remembers the event. He denies any recent illness, no fevers, chills, nausea, vomiting but did have diarrhea this morning. CXR: (-)    social history: wife present at beginning of evaluation, provided history. pt lives with wife, no stairs to negotiate, pt has HHA 7 h/5 days and wife assists with all needs. pt owns rolling walker, wheelchair; required assistance for ADLs

## 2019-04-17 NOTE — DISCHARGE NOTE PROVIDER - CARE PROVIDER_API CALL
Bhupinder Calle)  Urology  [address] [place] [address], [address]  [place] [address]  Phone: [phone]  Fax: [phone]  Follow Up Time:     Luther Mcdonnell)  Physician Assistant Services  [address] [place] [address]  Phone: [phone]  Fax: [phone]  Follow Up Time:     Dick Crow)  Neurology  [address] [place] [address]  Phone: [phone]  Fax: [phone]  Follow Up Time:

## 2019-04-17 NOTE — PHYSICAL THERAPY INITIAL EVALUATION ADULT - PERTINENT HX OF CURRENT PROBLEM, REHAB EVAL
78M pmhx of HTN, HLD, chronic indwelling núñez after admission last month with multiple issues of núñez clogging and retention, Parkinson's disease who presents from home after calling EMS for clogged núñez and pain. When the EMS arrived he was almost dropped from the stretcher (was not dropped) contd below:

## 2019-04-17 NOTE — DISCHARGE NOTE PROVIDER - HOSPITAL COURSE
78 M PMHx of HTN, HLD, chronic indwelling núñez after admission last month with multiple issues of núñez clogging and retention, Parkinson's disease admitted with repeat urinary retention/núñez clogging and SIRS with unclear source with bacterial conjunctivitis. Urinary retention.  Plan: Has chronic núñez since March admission- he has hx of prostate cancer s/p radiation and TURPx 2.  Now is on irrigation but still having leaking around the núñez insertion site and suprapubic pain. Urology consulted appreciated. Was recently D/C'd off Flomax and told he will likely need life long núñez. Syncope, Sounds more like vasovagal from urinary retention and almost falling off of stretcher.  Low suspicion for seizure. Can monitor on tele for 24 hrs, trops negative x 2 both negative. Continue ASA and get TTE (but can be done outpt). SIRS, CXR negative, UA negative, no more diarrhea. RVP neg, bacterial conjunctivitis: Erythromycin ointment. Metabolic acidosis likely due to diarrhea, continue to monitor and stool studies as below. Anemia of chronic disease. Mild hematuria, likely dehydrated and falsely elevated. Will repeat CBC in am, Transfuse < 7. CKD stage G3b/A2, GFR 30-44 and albumin creatinine ratio  mg/g.  Hold nephrotoxic agents, continue to monitor and urology consult for obstruction with chronic núñez. Thrombocytosis.  Has hx of this on past admissions, continue to monitor. Could be 2/2 dehydration in this instance. Diarrhea, if recurrent, check stool O/P and C diff- has recent antibiotic use and low grade fever with leukocytosis. Parkinson's disease. On Rytari. Depression. Sertraline 100mg PO daily. Gout. Allopurinol 100mg PO daily. 79 y/o M w/ PMHx of HTN, HLD, chronic indwelling núñez after admission last month with multiple issues of núñez clogging and retention, Shy Drager Parkinson's disease who presents from home after calling EMS for clogged núñez and pain. When the EMS arrived he was almost dropped from the stretcher (was not dropped) but then got excited and passed out for several seconds and his wife noticed some shaking movements.  Denies tongue biting or bowel incontinence  He remembers the event. He denies any recent illness, no fevers, chills, nausea, vomiting but did have diarrhea this morning.  He is still having some suprapubic and penile discomfort.         Urology consulted to evaluate núñez for dark maroon urine in tube. Núñez was still draining. Patient denies abdominal pain. Patient manually irrigated and found to not have any clots in the bladder. No signs of active bleeding. Minimal sediment. Likely old blood from traumatic núñez placement.    Urine now very light pink. No indication for CBI. Continue to irrigate núñez with 60cc NS q shift.     Neurology consulted for hx of Parkinson's and difficulty ambulating. Suspect physical deconditioning due to prolonged bed bound status; but must rule out cervical cord pathology given exam findings and may potentially explain urinary retention issues.    MRI performed and was negative for acute pathology.         Patient experienced orthostatic hypotension and was treated with IV fluids. BP has now resolved.     Treated with erythromycin ointment for bacterial conjunctivitis for 10 days.         Patient is otherwise medically stable for discharge to rehab.

## 2019-04-17 NOTE — PHYSICAL THERAPY INITIAL EVALUATION ADULT - DISCHARGE DISPOSITION, PT EVAL
Subacute Rehab, however if pt to go home, will require Home PT and assistance for ALL functional mobility/activities/rehabilitation facility

## 2019-04-17 NOTE — DISCHARGE NOTE PROVIDER - NSDCFUADDAPPT_GEN_ALL_CORE_FT
Follow-up with your Primary Care Doctor within one week of discharge  Follow-up with your Urologist within one to two weeks of discharge  Follow-up with your Neurologist Follow-up with your Primary Care Doctor within one week of discharge  Follow-up with your Urologist within one to two weeks of discharge  Follow-up with your Neurologist for ongoing management.

## 2019-04-17 NOTE — DISCHARGE NOTE PROVIDER - INSTRUCTIONS
Sodium and Cholesterol Restricted Diet No fluid restrictions. Follow low sodium/salt and low cholesterol/fat diet. Avoid added salt, frozen dinners, canned soups and broths, foods fried in oil, salted/cured meats including ham, yeager, and other deli meats high in sodium. Eat plenty of fruits, vegetables and whole grains.

## 2019-04-17 NOTE — DISCHARGE NOTE PROVIDER - CARE PROVIDERS DIRECT ADDRESSES
[email or web address],DirectAddress_Unknown

## 2019-04-18 LAB
ANION GAP SERPL CALC-SCNC: 14 MMOL/L — SIGNIFICANT CHANGE UP (ref 5–17)
BUN SERPL-MCNC: 16 MG/DL — SIGNIFICANT CHANGE UP (ref 7–23)
CALCIUM SERPL-MCNC: 9 MG/DL — SIGNIFICANT CHANGE UP (ref 8.4–10.5)
CHLORIDE SERPL-SCNC: 105 MMOL/L — SIGNIFICANT CHANGE UP (ref 96–108)
CO2 SERPL-SCNC: 21 MMOL/L — LOW (ref 22–31)
CREAT SERPL-MCNC: 1.1 MG/DL — SIGNIFICANT CHANGE UP (ref 0.5–1.3)
GLUCOSE SERPL-MCNC: 86 MG/DL — SIGNIFICANT CHANGE UP (ref 70–99)
HCT VFR BLD CALC: 29.3 % — LOW (ref 39–50)
HGB BLD-MCNC: 8.9 G/DL — LOW (ref 13–17)
MCHC RBC-ENTMCNC: 27.6 PG — SIGNIFICANT CHANGE UP (ref 27–34)
MCHC RBC-ENTMCNC: 30.4 GM/DL — LOW (ref 32–36)
MCV RBC AUTO: 91 FL — SIGNIFICANT CHANGE UP (ref 80–100)
PLATELET # BLD AUTO: 459 K/UL — HIGH (ref 150–400)
POTASSIUM SERPL-MCNC: 3.6 MMOL/L — SIGNIFICANT CHANGE UP (ref 3.5–5.3)
POTASSIUM SERPL-SCNC: 3.6 MMOL/L — SIGNIFICANT CHANGE UP (ref 3.5–5.3)
RBC # BLD: 3.22 M/UL — LOW (ref 4.2–5.8)
RBC # FLD: 14.4 % — SIGNIFICANT CHANGE UP (ref 10.3–14.5)
SODIUM SERPL-SCNC: 140 MMOL/L — SIGNIFICANT CHANGE UP (ref 135–145)
WBC # BLD: 14.91 K/UL — HIGH (ref 3.8–10.5)
WBC # FLD AUTO: 14.91 K/UL — HIGH (ref 3.8–10.5)

## 2019-04-18 PROCEDURE — 93306 TTE W/DOPPLER COMPLETE: CPT | Mod: 26

## 2019-04-18 PROCEDURE — 93010 ELECTROCARDIOGRAM REPORT: CPT

## 2019-04-18 PROCEDURE — 99222 1ST HOSP IP/OBS MODERATE 55: CPT

## 2019-04-18 RX ORDER — TIZANIDINE 4 MG/1
2 TABLET ORAL EVERY 8 HOURS
Qty: 0 | Refills: 0 | Status: DISCONTINUED | OUTPATIENT
Start: 2019-04-18 | End: 2019-04-18

## 2019-04-18 RX ADMIN — FAMOTIDINE 20 MILLIGRAM(S): 10 INJECTION INTRAVENOUS at 13:17

## 2019-04-18 RX ADMIN — CARBIDOPA AND LEVODOPA 2 CAPSULE(S): 25; 100 TABLET ORAL at 05:15

## 2019-04-18 RX ADMIN — CARBIDOPA AND LEVODOPA 2 CAPSULE(S): 25; 100 TABLET ORAL at 23:18

## 2019-04-18 RX ADMIN — LIDOCAINE 1 APPLICATION(S): 4 CREAM TOPICAL at 18:06

## 2019-04-18 RX ADMIN — Medication 1 APPLICATION(S): at 18:05

## 2019-04-18 RX ADMIN — CARBIDOPA AND LEVODOPA 2 CAPSULE(S): 25; 100 TABLET ORAL at 13:17

## 2019-04-18 RX ADMIN — RASAGILINE 1 MILLIGRAM(S): 0.5 TABLET ORAL at 13:17

## 2019-04-18 RX ADMIN — CARBIDOPA AND LEVODOPA 2 CAPSULE(S): 25; 100 TABLET ORAL at 18:06

## 2019-04-18 RX ADMIN — SERTRALINE 100 MILLIGRAM(S): 25 TABLET, FILM COATED ORAL at 13:17

## 2019-04-18 RX ADMIN — Medication 1 APPLICATION(S): at 05:16

## 2019-04-18 RX ADMIN — Medication 100 MILLIGRAM(S): at 13:17

## 2019-04-18 RX ADMIN — Medication 100 MILLIGRAM(S): at 18:06

## 2019-04-18 RX ADMIN — Medication 100 MILLIGRAM(S): at 05:15

## 2019-04-18 RX ADMIN — SIMVASTATIN 10 MILLIGRAM(S): 20 TABLET, FILM COATED ORAL at 23:18

## 2019-04-18 NOTE — CONSULT NOTE ADULT - ATTENDING COMMENTS
Patient with PD, rapid decline in gait function over two months.  On exam he has mask facies but is somewhat animated with expression, mild cogwheeling and dyskinesias UE's, no tremor.     LE's are spastic with bilateral upgoing toes and decreased vibratory sensory at the toes.      Need to r/o cervical spondylosis with cord compression before he goes to rehab.  MRI C spine should be done, will f/u after imaging done

## 2019-04-18 NOTE — CONSULT NOTE ADULT - ASSESSMENT
78M pmhx of Parkinson's, HTN, HLD, chronic indwelling Núñez after admissions last month with multiple issues of núñez clogging and retention, admitted with urinary retention and clogged núñez; now with hematuria.    Impression: Given hx provided by patient and fiance, and his neurological exam, he was apparently doing well on his PD meds regimen up until his multiple hospitalizations, where he remained bed bound. Exam shows increased rigidity in his LE L>R but no objective evidence of weakness in his muscles. Suspect physical deconditioning due to prolonged bed bound status. Discussed at length with his fiance also.     [] Trial of Tizanidine 2mg Q8h - up-titrate as tolerated to help with LE tone which may help with ambulation.  [] Check B12, folate, TSH/FT4, A1c  [] continue current PD meds for now.    [>>] Pt would benefit from aggressive rehab and OOB to chair. Suggest have pt sit in chair 30 minutes before someone tries to walk and also PT attempt some in-chair exercises before trying to walk him.     Pt has appointment coming up with Dr. Crow (movement disorders specialist) 78M pmhx of Parkinson's, HTN, HLD, chronic indwelling Núñez after admissions last month with multiple issues of núñez clogging and retention, admitted with urinary retention and clogged núñez; now with hematuria.    Impression: Given hx provided by patient and fiance, and his neurological exam, he was apparently doing well on his PD meds regimen up until his multiple hospitalizations, where he remained bed bound. Exam shows increased rigidity in his LE L>R but no objective evidence of weakness in his muscles. Suspect physical deconditioning due to prolonged bed bound status. Discussed at length with his fiance also.     [] Trial of Tizanidine 2mg Q8h - up-titrate as tolerated to help with LE tone which may help with ambulation. If no response within 1-2 days, no need to continue it.  [] Check B12, folate, TSH/FT4, A1c  [] continue current PD meds for now.    [>>] Pt would benefit from aggressive rehab and OOB to chair. Suggest have pt sit in chair 30 minutes before someone tries to walk and also PT attempt some in-chair exercises before trying to walk him.     Pt has appointment coming up with Dr. Crow (movement disorders specialist) 78M pmhx of Parkinson's, HTN, HLD, chronic indwelling Núñez after admissions last month with multiple issues of núñez clogging and retention, admitted with urinary retention and clogged núñez; now with hematuria.    Impression: Given hx provided by patient and fiance, and his neurological exam, he was apparently doing well on his PD meds regimen up until his multiple hospitalizations, where he remained bed bound. Exam shows increased rigidity in his LE L>R but no objective evidence of weakness in his muscles. Suspect physical deconditioning due to prolonged bed bound status. Discussed at length with his fiance also.     [] Trial of Tizanidine 2mg Q8h - up-titrate as tolerated to help with LE tone which may help with ambulation. If no response within 1-2 days, no need to continue it.  [] Check B12, folate, TSH/FT4, A1c  [] continue current PD meds for now.    [>>] Pt would benefit from aggressive rehab and OOB to chair. Suggest have pt sit in chair 30 minutes before someone tries to walk and also PT attempt some in-chair exercises before trying to walk him.     Pt has appointment coming up with Dr. Damien Crow (movement disorders specialist) who he should follow up with to establish care. 78M pmhx of Parkinson's, HTN, HLD, chronic indwelling Núñez after admissions last month with multiple issues of núñez clogging and retention, admitted with urinary retention and clogged núñez; now with hematuria.    Impression: Given hx provided by patient and fiance, and his neurological exam, he was apparently doing well on his PD meds regimen up until his multiple hospitalizations, where he remained bed bound. Exam shows increased rigidity in his LE L>R as well as impaired vibration in LEs with babinski b/l. No objective evidence of weakness in his muscles. Suspect physical deconditioning due to prolonged bed bound status; but must rule out cervical cord pathology given exam findings.    [] obtain MRI C, T-spine w/o kristan  [] Check B12, folate, TSH/FT4, A1c  [] continue current PD meds for now.    [>>] Pt would benefit from aggressive rehab and OOB to chair. Suggest have pt sit in chair 30 minutes before someone tries to walk and also PT attempt some in-chair exercises before trying to walk him.     Pt has appointment coming up with Dr. Damien Crow (movement disorders specialist) who he should follow up with to establish care. 78M pmhx of Parkinson's, HTN, HLD, chronic indwelling Núñez after admissions last month with multiple issues of núñez clogging and retention, admitted with urinary retention and clogged núñez; now with hematuria.    Impression: Given hx provided by patient and fiance, and his neurological exam, he was apparently doing well on his PD meds regimen up until his multiple hospitalizations, where he remained bed bound. Exam shows increased rigidity in his LE L>R as well as impaired vibration in LEs with babinski b/l. No objective evidence of weakness in his muscles. Suspect physical deconditioning due to prolonged bed bound status; but must rule out cervical cord pathology given exam findings.    [] obtain MRI C-spine w/o kristan  [] Check B12, folate, TSH/FT4, A1c  [] continue current PD meds for now.    [>>] Pt would benefit from aggressive rehab and OOB to chair. Suggest have pt sit in chair 30 minutes before someone tries to walk and also PT attempt some in-chair exercises before trying to walk him.     Pt has appointment coming up with Dr. Damien Crow (movement disorders specialist) who he should follow up with to establish care. 78M pmhx of Parkinson's, HTN, HLD, chronic indwelling Núñez after admissions last month with multiple issues of núñez clogging and retention, admitted with urinary retention and clogged núñez; now with hematuria.    Impression: Given hx provided by patient and fiance, and his neurological exam, he was apparently doing well on his PD meds regimen up until his multiple hospitalizations, where he remained bed bound. Exam shows increased rigidity in his LE L>R as well as impaired vibration in LEs with babinski b/l. No objective evidence of weakness in his muscles. Suspect physical deconditioning due to prolonged bed bound status; but must rule out cervical cord pathology given exam findings and may potentially explain urinary retention issues.    [] obtain MRI C-spine w/o kristan  [] Check B12, folate, TSH/FT4, A1c  [] continue current PD meds for now.    [>>] Pt would benefit from aggressive rehab and OOB to chair. Suggest have pt sit in chair 30 minutes before someone tries to walk and also PT attempt some in-chair exercises before trying to walk him.     Pt has appointment coming up with Dr. Damien Crow (movement disorders specialist) who he should follow up with to establish care.

## 2019-04-18 NOTE — CHART NOTE - NSCHARTNOTEFT_GEN_A_CORE
Notified by RN for 15seconds of PAT on tele. Asymptomatic. Denies lightheadedness, CP, SOB, palpitation, abd pain, N/V.     Vital Signs Last 24 Hrs  T(C): 36.8 (18 Apr 2019 21:32), Max: 36.8 (18 Apr 2019 21:32)  T(F): 98.2 (18 Apr 2019 21:32), Max: 98.2 (18 Apr 2019 21:32)  HR: 80 (18 Apr 2019 21:32) (65 - 85)  BP: 116/76 (18 Apr 2019 21:32) (116/76 - 145/79)  BP(mean): --  RR: 19 (18 Apr 2019 21:32) (18 - 19)  SpO2: 96% (18 Apr 2019 21:32) (95% - 96%)      Labs:                          8.9    14.91 )-----------( 459      ( 18 Apr 2019 07:41 )             29.3     04-18    140  |  105  |  16  ----------------------------<  86  3.6   |  21<L>  |  1.10    Ca    9.0      18 Apr 2019 06:05      Assessment & Plan:  HPI:  78 M PMHx of HTN, HLD, chronic indwelling núñez after admission last month with multiple issues of núñez clogging and retention, Parkinson's disease admitted with repeat urinary retention/núñez clogging and SIRS with unclear source with bacterial conjunctivitis. Now with 15s PAT.      # 15 seconds of PAT    - Asymptomatic    - hemodynamically stable    - Baseline rhythm sinus with HR in 60-70 on tele    - 12 leads EKG    - Electrolytes stat    - Keep K > 3, Mg > 2, Phos > 3    - Will closely monitor on tele, clinical status, and vitals    - Will endorse to primary team in AM    Skye Jaimes PA-C Notified by RN for 15seconds of PAT with  on tele. Asymptomatic. Denies lightheadedness, CP, SOB, palpitation, abd pain, N/V.     Vital Signs Last 24 Hrs  T(C): 36.8 (18 Apr 2019 21:32), Max: 36.8 (18 Apr 2019 21:32)  T(F): 98.2 (18 Apr 2019 21:32), Max: 98.2 (18 Apr 2019 21:32)  HR: 80 (18 Apr 2019 21:32) (65 - 85)  BP: 116/76 (18 Apr 2019 21:32) (116/76 - 145/79)  BP(mean): --  RR: 19 (18 Apr 2019 21:32) (18 - 19)  SpO2: 96% (18 Apr 2019 21:32) (95% - 96%)      Labs:                          8.9    14.91 )-----------( 459      ( 18 Apr 2019 07:41 )             29.3     04-18    140  |  105  |  16  ----------------------------<  86  3.6   |  21<L>  |  1.10    Ca    9.0      18 Apr 2019 06:05      Assessment & Plan:  HPI:  78 M PMHx of HTN, HLD, chronic indwelling núñez after admission last month with multiple issues of núñez clogging and retention, Parkinson's disease admitted with repeat urinary retention/núñez clogging and SIRS with unclear source with bacterial conjunctivitis. Now with 15s PAT.      # 15 seconds of PAT    - Asymptomatic    - hemodynamically stable    - Baseline rhythm sinus with HR in 60-70 on tele    - 12 leads EKG    - Electrolytes stat    - Keep K > 3, Mg > 2, Phos > 3    - Will closely monitor on tele, clinical status, and vitals    - Will endorse to primary team in AM    Skye Jaimes PA-C Notified by RN for 15seconds of PAT with  on tele. Asymptomatic. Denies lightheadedness, CP, SOB, palpitation, abd pain, N/V.     Vital Signs Last 24 Hrs  T(C): 36.8 (18 Apr 2019 21:32), Max: 36.8 (18 Apr 2019 21:32)  T(F): 98.2 (18 Apr 2019 21:32), Max: 98.2 (18 Apr 2019 21:32)  HR: 80 (18 Apr 2019 21:32) (65 - 85)  BP: 116/76 (18 Apr 2019 21:32) (116/76 - 145/79)  BP(mean): --  RR: 19 (18 Apr 2019 21:32) (18 - 19)  SpO2: 96% (18 Apr 2019 21:32) (95% - 96%)      Labs:                          8.9    14.91 )-----------( 459      ( 18 Apr 2019 07:41 )             29.3     04-18    140  |  105  |  16  ----------------------------<  86  3.6   |  21<L>  |  1.10    Ca    9.0      18 Apr 2019 06:05      Assessment & Plan:  HPI:  78 M PMHx of HTN, HLD, chronic indwelling núñez after admission last month with multiple issues of núñez clogging and retention, Parkinson's disease admitted with repeat urinary retention/núñez clogging and SIRS with unclear source with bacterial conjunctivitis. Now with 15s PAT.      # 15 seconds of PAT    - Asymptomatic    - hemodynamically stable    - Baseline rhythm sinus with HR in 60-70 on tele    - 12 leads EKG shows NSR without T/ST changes     - Electrolytes stat    - Keep K > 3, Mg > 2, Phos > 3    - Will closely monitor on tele, clinical status, and vitals    - Will endorse to primary team in AM    Skye Jaimes PA-C

## 2019-04-18 NOTE — CONSULT NOTE ADULT - SUBJECTIVE AND OBJECTIVE BOX
*************************************  NEUROLOGY CONSULT  NOTE  **************************************    TANK VARGAS  Male  MRN-[id number]    HPI:  78M pmhx of Parkinson's, HTN, HLD, chronic indwelling núñez after admission last month with multiple issues of núñez clogging and retention, Parkinson's disease who presents from home after calling EMS for clogged núñez and pain. When the EMS arrived he was almost dropped from the stretcher (was not dropped) but then got excited and passed out for several seconds and his wife noticed some shaking movements.  Denies tongue biting or bowel incontinence  He remembers the event.   Neurology consulted for hx of Parkinson's and difficulty ambulating. Spoke to kailey Martinez on phone to get collateral. Pt was diagnosed with PD 15 years ago due to L arm tremor; initially followed with neurologist Dr. Bailey; then Dr. Bennett then at [facility] and recently been referred to movement disorder specialist Dr. Crow who they have missed last 2 appointments with due to multipl hospitalizations for his medical issues. As per Kailey and pt; he is been doing quite well on the medication regimen until his other medical problems started.     ROS: Patient denies trauma, LOC, chills, HA, vision changes, tinnitus, dysarthria, dysphagia, dizziness, chest pain, palpitations, SOB, nausea, vomiting.    PAST MEDICAL & SURGICAL HISTORY:  Gout  Prostate cancer  HLD (hyperlipidemia)  HTN (hypertension)  Depression  Parkinson's disease  S/P TURP    MEDICATIONS  (STANDING):  allopurinol 100 milliGRAM(s) Oral daily  amantadine 100 milliGRAM(s) Oral two times a day  carbidopa 36.25 mG/levodopa 145 mG ER 2 Capsule(s) Oral every 6 hours  erythromycin   Ointment 1 Application(s) Both EYES two times a day  famotidine    Tablet 20 milliGRAM(s) Oral daily  rasagiline Tablet 1 milliGRAM(s) Oral daily  sertraline 100 milliGRAM(s) Oral daily  simvastatin 10 milliGRAM(s) Oral at bedtime    MEDICATIONS  (PRN):  ALPRAZolam 0.25 milliGRAM(s) Oral daily PRN anxiety  lidocaine 2% Gel 1 Application(s) Topical two times a day PRN pain    Allergies  No Known Allergies  Intolerances    VITAL SIGNS:  Vital Signs Last 24 Hrs  T(C): 36.6 (18 Apr 2019 11:02), Max: 36.9 (17 Apr 2019 22:06)  T(F): 97.9 (18 Apr 2019 11:02), Max: 98.5 (17 Apr 2019 22:06)  HR: 65 (18 Apr 2019 11:02) (65 - 96)  BP: 124/77 (18 Apr 2019 11:02) (124/77 - 155/87)  BP(mean): --  RR: 18 (18 Apr 2019 11:02) (18 - 18)  SpO2: 95% (18 Apr 2019 11:02) (95% - 96%)    PHYSICAL EXAMINATION:  General: elderly, disheveled, cooperative  Eyes: Left eye with mild purulence.  Neck: Supple, nontender  Skin: no rash, no edema noted  Lung: no respiratory distress noted  Neurologic:  - Mental Status:  Alert, awake, oriented to person, place, president, not to time; Speech moderately dysarthric (missing dentures) is fluent with intact naming, repetition, and comprehension; crosses midline, no extinction or neglect noted; Some memory issues, unable to tell exactly why in hospital, but knows general course of events over past 2 months.  - Cranial Nerves II-XII:  VFF, No nystagmus or APD noted, EOMI, PERRLA, V1-V3 intact, no facial asymmetry, t/p midline, SCM/trap intact.  - Motor:  Strength is 5/5 throughout.  There is no pronator drift.  Normal muscle bulk and tone throughout. There is intermittent low amplitude tremor L>R UE but no cogwheeling rigidity, tone appears normal in UE; there is increased rigidity in both LE L>R as well as occasional tremor in LLE.  - Reflexes:  trace and symmetric at the biceps, triceps, brachioradialis, knees, and ankles.  Plantars mute  - Sensory:  Intact to light touch, proprioception throughout.  - Coordination:  Finger-nose-finger without dysmetria.  Rapid alternating hand movements intact.  - Gait:   Was able to stand up with assistance, but did not walk said feels unsteady.    LABS:                          8.9    14.91 )-----------( 459      ( 18 Apr 2019 07:41 )             29.3     04-18    140  |  105  |  16  ----------------------------<  86  3.6   |  21<L>  |  1.10    Ca    9.0      18 Apr 2019 06:05          RADIOLOGY & ADDITIONAL STUDIES:    no recent neuro rads *************************************  NEUROLOGY CONSULT  NOTE  **************************************    TANK VARGAS  Male  MRN-[id number]    HPI:  78M pmhx of Parkinson's, HTN, HLD, chronic indwelling núñez after admission last month with multiple issues of núñez clogging and retention, Parkinson's disease who presents from home after calling EMS for clogged núñez and pain. When the EMS arrived he was almost dropped from the stretcher (was not dropped) but then got excited and passed out for several seconds and his wife noticed some shaking movements.  Denies tongue biting or bowel incontinence  He remembers the event.   Neurology consulted for hx of Parkinson's and difficulty ambulating. Spoke to kailey Martinez on phone to get collateral. Pt was diagnosed with PD 15 years ago due to L arm tremor; initially followed with neurologist Dr. Bailey; then Dr. Bennett then at [facility] and recently been referred to movement disorder specialist Dr. Crow who they have missed last 2 appointments with due to multipl hospitalizations for his medical issues. As per Kailey and pt; he is been doing quite well on the medication regimen until his other medical problems started.     ROS: Patient denies trauma, LOC, chills, HA, vision changes, tinnitus, dysarthria, dysphagia, dizziness, chest pain, palpitations, SOB, nausea, vomiting.    PAST MEDICAL & SURGICAL HISTORY:  Gout  Prostate cancer  HLD (hyperlipidemia)  HTN (hypertension)  Depression  Parkinson's disease  S/P TURP    MEDICATIONS  (STANDING):  allopurinol 100 milliGRAM(s) Oral daily  amantadine 100 milliGRAM(s) Oral two times a day  carbidopa 36.25 mG/levodopa 145 mG ER 2 Capsule(s) Oral every 6 hours  erythromycin   Ointment 1 Application(s) Both EYES two times a day  famotidine    Tablet 20 milliGRAM(s) Oral daily  rasagiline Tablet 1 milliGRAM(s) Oral daily  sertraline 100 milliGRAM(s) Oral daily  simvastatin 10 milliGRAM(s) Oral at bedtime    MEDICATIONS  (PRN):  ALPRAZolam 0.25 milliGRAM(s) Oral daily PRN anxiety  lidocaine 2% Gel 1 Application(s) Topical two times a day PRN pain    Allergies  No Known Allergies  Intolerances    VITAL SIGNS:  Vital Signs Last 24 Hrs  T(C): 36.6 (18 Apr 2019 11:02), Max: 36.9 (17 Apr 2019 22:06)  T(F): 97.9 (18 Apr 2019 11:02), Max: 98.5 (17 Apr 2019 22:06)  HR: 65 (18 Apr 2019 11:02) (65 - 96)  BP: 124/77 (18 Apr 2019 11:02) (124/77 - 155/87)  BP(mean): --  RR: 18 (18 Apr 2019 11:02) (18 - 18)  SpO2: 95% (18 Apr 2019 11:02) (95% - 96%)    PHYSICAL EXAMINATION:  General: elderly, disheveled, cooperative  Eyes: Left eye with mild purulence.  Neck: Supple, nontender  Skin: no rash, no edema noted  Lung: no respiratory distress noted  Neurologic:  - Mental Status:  Alert, awake, oriented to person, place, president, not to time; Speech moderately dysarthric (missing dentures) is fluent with intact naming, repetition, and comprehension; crosses midline, no extinction or neglect noted; Some memory issues, unable to tell exactly why in hospital, but knows general course of events over past 2 months.  - Cranial Nerves II-XII:  VFF, No nystagmus or APD noted, EOMI, PERRLA, V1-V3 intact, no facial asymmetry, t/p midline, SCM/trap intact.  - Motor:  Strength is 5/5 throughout.  There is no pronator drift.  Normal muscle bulk and tone throughout. There is intermittent low amplitude tremor L>R UE but no cogwheeling rigidity, tone appears normal in UE; there is increased rigidity in both LE L>R as well as occasional tremor in LLE.  - Reflexes:  trace and symmetric at the biceps, triceps, brachioradialis, knees, and ankles.  Plantars extensor b/l  - Sensory:  Intact to light touch. Impaired vibration in LE.  - Coordination:  Finger-nose-finger without dysmetria.  Rapid alternating hand movements intact.  - Gait:   Was able to stand up with assistance, but did not walk said feels unsteady.    LABS:                          8.9    14.91 )-----------( 459      ( 18 Apr 2019 07:41 )             29.3     04-18    140  |  105  |  16  ----------------------------<  86  3.6   |  21<L>  |  1.10    Ca    9.0      18 Apr 2019 06:05          RADIOLOGY & ADDITIONAL STUDIES:    no recent neuro rads

## 2019-04-19 LAB
ANION GAP SERPL CALC-SCNC: 14 MMOL/L — SIGNIFICANT CHANGE UP (ref 5–17)
BUN SERPL-MCNC: 18 MG/DL — SIGNIFICANT CHANGE UP (ref 7–23)
CALCIUM SERPL-MCNC: 8.9 MG/DL — SIGNIFICANT CHANGE UP (ref 8.4–10.5)
CHLORIDE SERPL-SCNC: 102 MMOL/L — SIGNIFICANT CHANGE UP (ref 96–108)
CO2 SERPL-SCNC: 22 MMOL/L — SIGNIFICANT CHANGE UP (ref 22–31)
CREAT SERPL-MCNC: 1.11 MG/DL — SIGNIFICANT CHANGE UP (ref 0.5–1.3)
FOLATE SERPL-MCNC: 3.7 NG/ML — LOW
GLUCOSE SERPL-MCNC: 105 MG/DL — HIGH (ref 70–99)
HBA1C BLD-MCNC: 4.9 % — SIGNIFICANT CHANGE UP (ref 4–5.6)
HCT VFR BLD CALC: 28.7 % — LOW (ref 39–50)
HGB BLD-MCNC: 9 G/DL — LOW (ref 13–17)
MAGNESIUM SERPL-MCNC: 1.9 MG/DL — SIGNIFICANT CHANGE UP (ref 1.6–2.6)
MCHC RBC-ENTMCNC: 28.2 PG — SIGNIFICANT CHANGE UP (ref 27–34)
MCHC RBC-ENTMCNC: 31.4 GM/DL — LOW (ref 32–36)
MCV RBC AUTO: 90 FL — SIGNIFICANT CHANGE UP (ref 80–100)
PHOSPHATE SERPL-MCNC: 3.5 MG/DL — SIGNIFICANT CHANGE UP (ref 2.5–4.5)
PLATELET # BLD AUTO: 498 K/UL — HIGH (ref 150–400)
POTASSIUM SERPL-MCNC: 4 MMOL/L — SIGNIFICANT CHANGE UP (ref 3.5–5.3)
POTASSIUM SERPL-SCNC: 4 MMOL/L — SIGNIFICANT CHANGE UP (ref 3.5–5.3)
RBC # BLD: 3.19 M/UL — LOW (ref 4.2–5.8)
RBC # FLD: 14.2 % — SIGNIFICANT CHANGE UP (ref 10.3–14.5)
SODIUM SERPL-SCNC: 138 MMOL/L — SIGNIFICANT CHANGE UP (ref 135–145)
T4 FREE SERPL-MCNC: 1.1 NG/DL — SIGNIFICANT CHANGE UP (ref 0.9–1.8)
TSH SERPL-MCNC: 1.59 UIU/ML — SIGNIFICANT CHANGE UP (ref 0.27–4.2)
VIT B12 SERPL-MCNC: 348 PG/ML — SIGNIFICANT CHANGE UP (ref 232–1245)
WBC # BLD: 13.58 K/UL — HIGH (ref 3.8–10.5)
WBC # FLD AUTO: 13.58 K/UL — HIGH (ref 3.8–10.5)

## 2019-04-19 PROCEDURE — 72141 MRI NECK SPINE W/O DYE: CPT | Mod: 26

## 2019-04-19 RX ORDER — PREGABALIN 225 MG/1
1000 CAPSULE ORAL ONCE
Qty: 0 | Refills: 0 | Status: COMPLETED | OUTPATIENT
Start: 2019-04-19 | End: 2019-04-20

## 2019-04-19 RX ORDER — ACETAMINOPHEN 500 MG
650 TABLET ORAL EVERY 6 HOURS
Qty: 0 | Refills: 0 | Status: DISCONTINUED | OUTPATIENT
Start: 2019-04-19 | End: 2019-04-24

## 2019-04-19 RX ORDER — LUBIPROSTONE 24 UG/1
8 CAPSULE, GELATIN COATED ORAL
Qty: 0 | Refills: 0 | Status: DISCONTINUED | OUTPATIENT
Start: 2019-04-19 | End: 2019-04-24

## 2019-04-19 RX ORDER — FOLIC ACID 0.8 MG
1 TABLET ORAL DAILY
Qty: 0 | Refills: 0 | Status: DISCONTINUED | OUTPATIENT
Start: 2019-04-19 | End: 2019-04-24

## 2019-04-19 RX ADMIN — Medication 1 APPLICATION(S): at 09:42

## 2019-04-19 RX ADMIN — CARBIDOPA AND LEVODOPA 2 CAPSULE(S): 25; 100 TABLET ORAL at 17:06

## 2019-04-19 RX ADMIN — Medication 100 MILLIGRAM(S): at 17:06

## 2019-04-19 RX ADMIN — CARBIDOPA AND LEVODOPA 2 CAPSULE(S): 25; 100 TABLET ORAL at 13:36

## 2019-04-19 RX ADMIN — SIMVASTATIN 10 MILLIGRAM(S): 20 TABLET, FILM COATED ORAL at 21:32

## 2019-04-19 RX ADMIN — LUBIPROSTONE 8 MICROGRAM(S): 24 CAPSULE, GELATIN COATED ORAL at 17:06

## 2019-04-19 RX ADMIN — CARBIDOPA AND LEVODOPA 2 CAPSULE(S): 25; 100 TABLET ORAL at 06:18

## 2019-04-19 RX ADMIN — FAMOTIDINE 20 MILLIGRAM(S): 10 INJECTION INTRAVENOUS at 13:36

## 2019-04-19 RX ADMIN — Medication 100 MILLIGRAM(S): at 06:21

## 2019-04-19 RX ADMIN — Medication 100 MILLIGRAM(S): at 13:36

## 2019-04-19 RX ADMIN — RASAGILINE 1 MILLIGRAM(S): 0.5 TABLET ORAL at 13:36

## 2019-04-19 RX ADMIN — SERTRALINE 100 MILLIGRAM(S): 25 TABLET, FILM COATED ORAL at 13:36

## 2019-04-19 RX ADMIN — Medication 1 APPLICATION(S): at 17:06

## 2019-04-19 RX ADMIN — LIDOCAINE 1 APPLICATION(S): 4 CREAM TOPICAL at 17:06

## 2019-04-19 NOTE — CHART NOTE - NSCHARTNOTEFT_GEN_A_CORE
B12 noted low end of normal.    - Please send serum methylmalonic acid level, anti-parietal cell Ab, intrinsic factor Ab first  - Give cyanocobalamin 1000mcg IM x1 ; would need outpt follow monitoring    Low b12 can worse or manifest with neurological deficits.

## 2019-04-19 NOTE — PROVIDER CONTACT NOTE (OTHER) - BACKGROUND
Pt admitted for syncope and collapse  Dx syncope and collapse, urinary retention, diarrhea  Hx of parkinsons, prostate CA, HTN

## 2019-04-20 LAB
ALBUMIN SERPL ELPH-MCNC: 3.5 G/DL — SIGNIFICANT CHANGE UP (ref 3.3–5)
ALP SERPL-CCNC: 104 U/L — SIGNIFICANT CHANGE UP (ref 40–120)
ALT FLD-CCNC: <5 U/L — LOW (ref 10–45)
ANION GAP SERPL CALC-SCNC: 14 MMOL/L — SIGNIFICANT CHANGE UP (ref 5–17)
AST SERPL-CCNC: 36 U/L — SIGNIFICANT CHANGE UP (ref 10–40)
BILIRUB SERPL-MCNC: 0.2 MG/DL — SIGNIFICANT CHANGE UP (ref 0.2–1.2)
BUN SERPL-MCNC: 17 MG/DL — SIGNIFICANT CHANGE UP (ref 7–23)
CALCIUM SERPL-MCNC: 9.3 MG/DL — SIGNIFICANT CHANGE UP (ref 8.4–10.5)
CHLORIDE SERPL-SCNC: 104 MMOL/L — SIGNIFICANT CHANGE UP (ref 96–108)
CO2 SERPL-SCNC: 24 MMOL/L — SIGNIFICANT CHANGE UP (ref 22–31)
CREAT SERPL-MCNC: 1.29 MG/DL — SIGNIFICANT CHANGE UP (ref 0.5–1.3)
GLUCOSE SERPL-MCNC: 106 MG/DL — HIGH (ref 70–99)
HCT VFR BLD CALC: 30.5 % — LOW (ref 39–50)
HGB BLD-MCNC: 9.2 G/DL — LOW (ref 13–17)
MCHC RBC-ENTMCNC: 27.5 PG — SIGNIFICANT CHANGE UP (ref 27–34)
MCHC RBC-ENTMCNC: 30.2 GM/DL — LOW (ref 32–36)
MCV RBC AUTO: 91.3 FL — SIGNIFICANT CHANGE UP (ref 80–100)
PLATELET # BLD AUTO: 517 K/UL — HIGH (ref 150–400)
POTASSIUM SERPL-MCNC: 3.4 MMOL/L — LOW (ref 3.5–5.3)
POTASSIUM SERPL-SCNC: 3.4 MMOL/L — LOW (ref 3.5–5.3)
PROT SERPL-MCNC: 7 G/DL — SIGNIFICANT CHANGE UP (ref 6–8.3)
RBC # BLD: 3.34 M/UL — LOW (ref 4.2–5.8)
RBC # FLD: 14.3 % — SIGNIFICANT CHANGE UP (ref 10.3–14.5)
SODIUM SERPL-SCNC: 142 MMOL/L — SIGNIFICANT CHANGE UP (ref 135–145)
WBC # BLD: 14.48 K/UL — HIGH (ref 3.8–10.5)
WBC # FLD AUTO: 14.48 K/UL — HIGH (ref 3.8–10.5)

## 2019-04-20 PROCEDURE — 99231 SBSQ HOSP IP/OBS SF/LOW 25: CPT

## 2019-04-20 PROCEDURE — 72146 MRI CHEST SPINE W/O DYE: CPT | Mod: 26

## 2019-04-20 RX ORDER — SENNA PLUS 8.6 MG/1
2 TABLET ORAL AT BEDTIME
Qty: 0 | Refills: 0 | Status: DISCONTINUED | OUTPATIENT
Start: 2019-04-20 | End: 2019-04-24

## 2019-04-20 RX ORDER — POLYETHYLENE GLYCOL 3350 17 G/17G
17 POWDER, FOR SOLUTION ORAL DAILY
Qty: 0 | Refills: 0 | Status: DISCONTINUED | OUTPATIENT
Start: 2019-04-20 | End: 2019-04-24

## 2019-04-20 RX ORDER — POTASSIUM CHLORIDE 20 MEQ
20 PACKET (EA) ORAL ONCE
Qty: 0 | Refills: 0 | Status: COMPLETED | OUTPATIENT
Start: 2019-04-20 | End: 2019-04-20

## 2019-04-20 RX ORDER — DOCUSATE SODIUM 100 MG
100 CAPSULE ORAL THREE TIMES A DAY
Qty: 0 | Refills: 0 | Status: DISCONTINUED | OUTPATIENT
Start: 2019-04-20 | End: 2019-04-24

## 2019-04-20 RX ADMIN — Medication 1 APPLICATION(S): at 17:00

## 2019-04-20 RX ADMIN — LUBIPROSTONE 8 MICROGRAM(S): 24 CAPSULE, GELATIN COATED ORAL at 17:00

## 2019-04-20 RX ADMIN — LIDOCAINE 1 APPLICATION(S): 4 CREAM TOPICAL at 05:19

## 2019-04-20 RX ADMIN — SERTRALINE 100 MILLIGRAM(S): 25 TABLET, FILM COATED ORAL at 12:34

## 2019-04-20 RX ADMIN — Medication 20 MILLIEQUIVALENT(S): at 18:54

## 2019-04-20 RX ADMIN — SENNA PLUS 2 TABLET(S): 8.6 TABLET ORAL at 22:32

## 2019-04-20 RX ADMIN — Medication 100 MILLIGRAM(S): at 05:18

## 2019-04-20 RX ADMIN — Medication 100 MILLIGRAM(S): at 12:34

## 2019-04-20 RX ADMIN — Medication 1 APPLICATION(S): at 05:18

## 2019-04-20 RX ADMIN — CARBIDOPA AND LEVODOPA 2 CAPSULE(S): 25; 100 TABLET ORAL at 00:15

## 2019-04-20 RX ADMIN — Medication 1 MILLIGRAM(S): at 12:35

## 2019-04-20 RX ADMIN — RASAGILINE 1 MILLIGRAM(S): 0.5 TABLET ORAL at 12:34

## 2019-04-20 RX ADMIN — LUBIPROSTONE 8 MICROGRAM(S): 24 CAPSULE, GELATIN COATED ORAL at 05:18

## 2019-04-20 RX ADMIN — CARBIDOPA AND LEVODOPA 2 CAPSULE(S): 25; 100 TABLET ORAL at 12:34

## 2019-04-20 RX ADMIN — CARBIDOPA AND LEVODOPA 2 CAPSULE(S): 25; 100 TABLET ORAL at 05:18

## 2019-04-20 RX ADMIN — Medication 0.25 MILLIGRAM(S): at 22:32

## 2019-04-20 RX ADMIN — Medication 100 MILLIGRAM(S): at 22:32

## 2019-04-20 RX ADMIN — PREGABALIN 1000 MICROGRAM(S): 225 CAPSULE ORAL at 05:18

## 2019-04-20 RX ADMIN — Medication 650 MILLIGRAM(S): at 12:34

## 2019-04-20 RX ADMIN — SIMVASTATIN 10 MILLIGRAM(S): 20 TABLET, FILM COATED ORAL at 22:32

## 2019-04-20 RX ADMIN — Medication 650 MILLIGRAM(S): at 13:53

## 2019-04-20 RX ADMIN — FAMOTIDINE 20 MILLIGRAM(S): 10 INJECTION INTRAVENOUS at 12:35

## 2019-04-20 RX ADMIN — CARBIDOPA AND LEVODOPA 2 CAPSULE(S): 25; 100 TABLET ORAL at 17:01

## 2019-04-20 RX ADMIN — Medication 100 MILLIGRAM(S): at 17:00

## 2019-04-20 NOTE — PROGRESS NOTE ADULT - ASSESSMENT
78M pmhx of Parkinson's, HTN, HLD, chronic indwelling Núñez after admissions last month with multiple issues of núñez clogging and retention, admitted with urinary retention and clogged núñez; now with hematuria.    Impression: Given hx provided by patient and fiance, and his neurological exam, he was apparently doing well on his PD meds regimen up until his multiple hospitalizations, where he remained bed bound. Exam shows increased rigidity in his LE L>R as well as impaired vibration in LEs with babinski b/l. No objective evidence of weakness in his muscles. Suspect physical deconditioning due to prolonged bed bound status; but must rule out cord pathology given exam findings and may potentially explain urinary retention issues.    4/20: Doing better. Sitting in chair. MRI C-spine w/o acute findings. 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     [] Please get MR Thoraccic and Lumbar Spine w/o to complete workup of ruling out cord involvement of his gait and bladder issues.   if above w/o acute findings; likely then from deconditioning and parkinson's alone  [x] B12/folate; low -replaced  [x] continue current PD meds    [>>] Pt would benefit from aggressive rehab and OOB to chair. Suggest have pt sit in chair 30 minutes before someone tries to walk and also PT attempt some in-chair exercises before trying to walk him.     Pt has appointment coming up with Dr. Damien Crow (movement disorders specialist) who he should follow up with to establish care. 78M pmhx of Parkinson's, HTN, HLD, chronic indwelling Núñez after admissions last month with multiple issues of núñez clogging and retention, admitted with urinary retention and clogged núñez; now with hematuria.    Impression: Given hx provided by patient and fiance, and his neurological exam, he was apparently doing well on his PD meds regimen up until his multiple hospitalizations, where he remained bed bound. Exam shows increased rigidity in his LE L>R as well as impaired vibration in LEs with babinski b/l. No objective evidence of weakness in his muscles. Suspect physical deconditioning due to prolonged bed bound status; but must rule out cord pathology given exam findings and may potentially explain urinary retention issues.    4/20: Doing better. Sitting in chair. MRI C-spine w/o acute findings. 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     [] Please get MR Thoraccic and Lumbar Spine w/o to complete workup of ruling out cord involvement of his gait issues.   if above w/o acute findings; likely then from deconditioning and parkinson's alone    [x] B12/folate; low -replaced  [x] continue current PD meds    [>>] Pt would benefit from aggressive rehab and OOB to chair. Suggest have pt sit in chair 30 minutes before someone tries to walk and also PT attempt some in-chair exercises before trying to walk him.     Pt has appointment coming up with Dr. Damien Crow (movement disorders specialist) who he should follow up with to establish care.

## 2019-04-21 LAB
ALBUMIN SERPL ELPH-MCNC: 3.4 G/DL — SIGNIFICANT CHANGE UP (ref 3.3–5)
ALP SERPL-CCNC: 105 U/L — SIGNIFICANT CHANGE UP (ref 40–120)
ALT FLD-CCNC: <5 U/L — LOW (ref 10–45)
ANION GAP SERPL CALC-SCNC: 10 MMOL/L — SIGNIFICANT CHANGE UP (ref 5–17)
ANION GAP SERPL CALC-SCNC: 15 MMOL/L — SIGNIFICANT CHANGE UP (ref 5–17)
AST SERPL-CCNC: 31 U/L — SIGNIFICANT CHANGE UP (ref 10–40)
BILIRUB SERPL-MCNC: 0.2 MG/DL — SIGNIFICANT CHANGE UP (ref 0.2–1.2)
BUN SERPL-MCNC: 19 MG/DL — SIGNIFICANT CHANGE UP (ref 7–23)
BUN SERPL-MCNC: 20 MG/DL — SIGNIFICANT CHANGE UP (ref 7–23)
CALCIUM SERPL-MCNC: 9.1 MG/DL — SIGNIFICANT CHANGE UP (ref 8.4–10.5)
CALCIUM SERPL-MCNC: 9.6 MG/DL — SIGNIFICANT CHANGE UP (ref 8.4–10.5)
CHLORIDE SERPL-SCNC: 104 MMOL/L — SIGNIFICANT CHANGE UP (ref 96–108)
CHLORIDE SERPL-SCNC: 107 MMOL/L — SIGNIFICANT CHANGE UP (ref 96–108)
CO2 SERPL-SCNC: 21 MMOL/L — LOW (ref 22–31)
CO2 SERPL-SCNC: 24 MMOL/L — SIGNIFICANT CHANGE UP (ref 22–31)
CREAT SERPL-MCNC: 1.27 MG/DL — SIGNIFICANT CHANGE UP (ref 0.5–1.3)
CREAT SERPL-MCNC: 1.31 MG/DL — HIGH (ref 0.5–1.3)
GLUCOSE SERPL-MCNC: 125 MG/DL — HIGH (ref 70–99)
GLUCOSE SERPL-MCNC: 93 MG/DL — SIGNIFICANT CHANGE UP (ref 70–99)
HCT VFR BLD CALC: 29.8 % — LOW (ref 39–50)
HCT VFR BLD CALC: 32.5 % — LOW (ref 39–50)
HGB BLD-MCNC: 10.2 G/DL — LOW (ref 13–17)
HGB BLD-MCNC: 9 G/DL — LOW (ref 13–17)
MAGNESIUM SERPL-MCNC: 2.1 MG/DL — SIGNIFICANT CHANGE UP (ref 1.6–2.6)
MCHC RBC-ENTMCNC: 28 PG — SIGNIFICANT CHANGE UP (ref 27–34)
MCHC RBC-ENTMCNC: 28.4 PG — SIGNIFICANT CHANGE UP (ref 27–34)
MCHC RBC-ENTMCNC: 30.2 GM/DL — LOW (ref 32–36)
MCHC RBC-ENTMCNC: 31.5 GM/DL — LOW (ref 32–36)
MCV RBC AUTO: 90.4 FL — SIGNIFICANT CHANGE UP (ref 80–100)
MCV RBC AUTO: 92.5 FL — SIGNIFICANT CHANGE UP (ref 80–100)
PLATELET # BLD AUTO: 503 K/UL — HIGH (ref 150–400)
PLATELET # BLD AUTO: 543 K/UL — HIGH (ref 150–400)
POTASSIUM SERPL-MCNC: 3.7 MMOL/L — SIGNIFICANT CHANGE UP (ref 3.5–5.3)
POTASSIUM SERPL-MCNC: 3.9 MMOL/L — SIGNIFICANT CHANGE UP (ref 3.5–5.3)
POTASSIUM SERPL-SCNC: 3.7 MMOL/L — SIGNIFICANT CHANGE UP (ref 3.5–5.3)
POTASSIUM SERPL-SCNC: 3.9 MMOL/L — SIGNIFICANT CHANGE UP (ref 3.5–5.3)
PROT SERPL-MCNC: 7.1 G/DL — SIGNIFICANT CHANGE UP (ref 6–8.3)
RBC # BLD: 3.22 M/UL — LOW (ref 4.2–5.8)
RBC # BLD: 3.6 M/UL — LOW (ref 4.2–5.8)
RBC # FLD: 13.5 % — SIGNIFICANT CHANGE UP (ref 10.3–14.5)
RBC # FLD: 14.3 % — SIGNIFICANT CHANGE UP (ref 10.3–14.5)
SODIUM SERPL-SCNC: 140 MMOL/L — SIGNIFICANT CHANGE UP (ref 135–145)
SODIUM SERPL-SCNC: 141 MMOL/L — SIGNIFICANT CHANGE UP (ref 135–145)
WBC # BLD: 14.16 K/UL — HIGH (ref 3.8–10.5)
WBC # BLD: 16.2 K/UL — HIGH (ref 3.8–10.5)
WBC # FLD AUTO: 14.16 K/UL — HIGH (ref 3.8–10.5)
WBC # FLD AUTO: 16.2 K/UL — HIGH (ref 3.8–10.5)

## 2019-04-21 PROCEDURE — 72148 MRI LUMBAR SPINE W/O DYE: CPT | Mod: 26

## 2019-04-21 PROCEDURE — 93010 ELECTROCARDIOGRAM REPORT: CPT

## 2019-04-21 RX ORDER — SODIUM CHLORIDE 9 MG/ML
1000 INJECTION INTRAMUSCULAR; INTRAVENOUS; SUBCUTANEOUS
Qty: 0 | Refills: 0 | Status: DISCONTINUED | OUTPATIENT
Start: 2019-04-21 | End: 2019-04-22

## 2019-04-21 RX ORDER — SODIUM CHLORIDE 9 MG/ML
500 INJECTION INTRAMUSCULAR; INTRAVENOUS; SUBCUTANEOUS
Qty: 0 | Refills: 0 | Status: COMPLETED | OUTPATIENT
Start: 2019-04-21 | End: 2019-04-21

## 2019-04-21 RX ADMIN — Medication 650 MILLIGRAM(S): at 02:11

## 2019-04-21 RX ADMIN — SIMVASTATIN 10 MILLIGRAM(S): 20 TABLET, FILM COATED ORAL at 22:18

## 2019-04-21 RX ADMIN — SODIUM CHLORIDE 500 MILLILITER(S): 9 INJECTION INTRAMUSCULAR; INTRAVENOUS; SUBCUTANEOUS at 19:23

## 2019-04-21 RX ADMIN — SENNA PLUS 2 TABLET(S): 8.6 TABLET ORAL at 22:18

## 2019-04-21 RX ADMIN — Medication 650 MILLIGRAM(S): at 00:06

## 2019-04-21 RX ADMIN — Medication 100 MILLIGRAM(S): at 05:58

## 2019-04-21 RX ADMIN — CARBIDOPA AND LEVODOPA 2 CAPSULE(S): 25; 100 TABLET ORAL at 00:07

## 2019-04-21 RX ADMIN — SODIUM CHLORIDE 75 MILLILITER(S): 9 INJECTION INTRAMUSCULAR; INTRAVENOUS; SUBCUTANEOUS at 17:01

## 2019-04-21 RX ADMIN — FAMOTIDINE 20 MILLIGRAM(S): 10 INJECTION INTRAVENOUS at 12:47

## 2019-04-21 RX ADMIN — SERTRALINE 100 MILLIGRAM(S): 25 TABLET, FILM COATED ORAL at 12:47

## 2019-04-21 RX ADMIN — CARBIDOPA AND LEVODOPA 2 CAPSULE(S): 25; 100 TABLET ORAL at 14:13

## 2019-04-21 RX ADMIN — Medication 100 MILLIGRAM(S): at 12:47

## 2019-04-21 RX ADMIN — LUBIPROSTONE 8 MICROGRAM(S): 24 CAPSULE, GELATIN COATED ORAL at 17:01

## 2019-04-21 RX ADMIN — Medication 650 MILLIGRAM(S): at 16:17

## 2019-04-21 RX ADMIN — RASAGILINE 1 MILLIGRAM(S): 0.5 TABLET ORAL at 12:47

## 2019-04-21 RX ADMIN — CARBIDOPA AND LEVODOPA 2 CAPSULE(S): 25; 100 TABLET ORAL at 05:58

## 2019-04-21 RX ADMIN — LUBIPROSTONE 8 MICROGRAM(S): 24 CAPSULE, GELATIN COATED ORAL at 05:58

## 2019-04-21 RX ADMIN — Medication 100 MILLIGRAM(S): at 17:01

## 2019-04-21 RX ADMIN — Medication 1 MILLIGRAM(S): at 12:47

## 2019-04-21 RX ADMIN — Medication 1 APPLICATION(S): at 05:58

## 2019-04-21 RX ADMIN — Medication 1 APPLICATION(S): at 17:01

## 2019-04-21 RX ADMIN — CARBIDOPA AND LEVODOPA 2 CAPSULE(S): 25; 100 TABLET ORAL at 17:01

## 2019-04-21 RX ADMIN — Medication 100 MILLIGRAM(S): at 22:18

## 2019-04-21 NOTE — CHART NOTE - NSCHARTNOTEFT_GEN_A_CORE
RRT called by RN for episode of syncope while transferring from stretcher to chair following return from MRI  RRT team in attendance / see RRT sheet    At conclusion on RRT, patient mental status at baseline, no acute distress, offers no complaints.  Telemetry on - Sinus Rhythm 70's  Chronic [place] with extremely dark "urine" - appears hematuric    Dr Frankel informed of above -   Will contact urology to re-eval patient  Continue tele monitoring / Follow up repeat labs

## 2019-04-21 NOTE — PROGRESS NOTE ADULT - ASSESSMENT
78 M PMHx of HTN, HLD, chronic indwelling núñez after admission last month with multiple issues of núñez clogging and retention, Parkinson's disease admitted with repeat urinary retention/núñze clogging and SIRS with unclear source with bacterial conjunctivitis.

## 2019-04-21 NOTE — CHART NOTE - NSCHARTNOTEFT_GEN_A_CORE
Pt is a 78 M PMHx of HTN, HLD, chronic indwelling núñez after admission last month with multiple issues of núñez clogging and retention, Parkinson's disease admitted with repeat urinary retention/núñez clogging and SIRS with unclear source with bacterial conjunctivitis. RRT was called this morning for syncopal episode. The patient had returned from MRI and was being transported from chair to bed when syncopal event occurred. He unfortunately was not on telemetry when this evening occurred. Patient was awake and alert, afebrile, 's-170's systolic, sat 98-99% on NC 2L on my arrival. Suspect orthstatic hypotension given positional change preceding syncopal event, though since patient was off telemetry, will need ongoing tele monitoring in attempt to capture any recurrent event. EKG during RRT reviewed, normal sinus rhythm. Would have urology evaluation Núñez with small volume hematuria (? trauma during syncopal event on transfer to bed).   -orthostatic vital signs  -primary team to notify attending  -would have urology reevaluate Núñez given hematuria (which was reportedly present overnight and earlier this admission)  -repeat set of labs this morning. AM labs reviewed.    Tolu Fitzpatrick, PGY-3  [phone]

## 2019-04-22 ENCOUNTER — APPOINTMENT (OUTPATIENT)
Dept: NEUROLOGY | Facility: CLINIC | Age: 79
End: 2019-04-22

## 2019-04-22 DIAGNOSIS — I95.1 ORTHOSTATIC HYPOTENSION: ICD-10-CM

## 2019-04-22 LAB
IF BLOCK AB SER-ACNC: SIGNIFICANT CHANGE UP
PCA AB SER-ACNC: SIGNIFICANT CHANGE UP

## 2019-04-22 RX ORDER — SODIUM CHLORIDE 9 MG/ML
1000 INJECTION INTRAMUSCULAR; INTRAVENOUS; SUBCUTANEOUS
Qty: 0 | Refills: 0 | Status: DISCONTINUED | OUTPATIENT
Start: 2019-04-22 | End: 2019-04-23

## 2019-04-22 RX ADMIN — Medication 100 MILLIGRAM(S): at 17:07

## 2019-04-22 RX ADMIN — SERTRALINE 100 MILLIGRAM(S): 25 TABLET, FILM COATED ORAL at 18:04

## 2019-04-22 RX ADMIN — LUBIPROSTONE 8 MICROGRAM(S): 24 CAPSULE, GELATIN COATED ORAL at 17:13

## 2019-04-22 RX ADMIN — RASAGILINE 1 MILLIGRAM(S): 0.5 TABLET ORAL at 17:08

## 2019-04-22 RX ADMIN — SIMVASTATIN 10 MILLIGRAM(S): 20 TABLET, FILM COATED ORAL at 23:23

## 2019-04-22 RX ADMIN — Medication 1 APPLICATION(S): at 17:09

## 2019-04-22 RX ADMIN — CARBIDOPA AND LEVODOPA 2 CAPSULE(S): 25; 100 TABLET ORAL at 12:41

## 2019-04-22 RX ADMIN — Medication 100 MILLIGRAM(S): at 05:56

## 2019-04-22 RX ADMIN — CARBIDOPA AND LEVODOPA 2 CAPSULE(S): 25; 100 TABLET ORAL at 23:22

## 2019-04-22 RX ADMIN — CARBIDOPA AND LEVODOPA 2 CAPSULE(S): 25; 100 TABLET ORAL at 05:56

## 2019-04-22 RX ADMIN — SODIUM CHLORIDE 75 MILLILITER(S): 9 INJECTION INTRAMUSCULAR; INTRAVENOUS; SUBCUTANEOUS at 23:24

## 2019-04-22 RX ADMIN — Medication 1 MILLIGRAM(S): at 12:40

## 2019-04-22 RX ADMIN — LUBIPROSTONE 8 MICROGRAM(S): 24 CAPSULE, GELATIN COATED ORAL at 05:56

## 2019-04-22 RX ADMIN — Medication 1 APPLICATION(S): at 05:56

## 2019-04-22 RX ADMIN — Medication 100 MILLIGRAM(S): at 12:53

## 2019-04-22 RX ADMIN — SENNA PLUS 2 TABLET(S): 8.6 TABLET ORAL at 23:23

## 2019-04-22 RX ADMIN — POLYETHYLENE GLYCOL 3350 17 GRAM(S): 17 POWDER, FOR SOLUTION ORAL at 17:08

## 2019-04-22 RX ADMIN — FAMOTIDINE 20 MILLIGRAM(S): 10 INJECTION INTRAVENOUS at 12:40

## 2019-04-22 RX ADMIN — CARBIDOPA AND LEVODOPA 2 CAPSULE(S): 25; 100 TABLET ORAL at 00:12

## 2019-04-22 RX ADMIN — Medication 100 MILLIGRAM(S): at 23:23

## 2019-04-22 RX ADMIN — CARBIDOPA AND LEVODOPA 2 CAPSULE(S): 25; 100 TABLET ORAL at 17:08

## 2019-04-22 NOTE — DIETITIAN INITIAL EVALUATION ADULT. - NS AS NUTRI INTERV MEDICAL AND FOOD SUPPLEMENTS
Commercial food/RD to add Thrive Gelato 1x per day for pt try. Pt declines other nutrition supplements

## 2019-04-22 NOTE — DIETITIAN INITIAL EVALUATION ADULT. - FACTORS AFF FOOD INTAKE
dislike of institutionalized foods, pt noted to be on Soft diet during last admission, pt currently on regular consistency diet, denies any issues chewing/swallowing./Restorationism/ethnic/cultural/personal food preferences

## 2019-04-22 NOTE — DIETITIAN INITIAL EVALUATION ADULT. - ORAL INTAKE PTA
Pt reports fair PO intake over the last few weeks due to frequent hospitalizations.. Confirms NKFA. Pt denies chewing/swallowing difficulty, nausea, vomiting, noted with some diarrhea PTA now resolved. No micronutrient supplementation noted./fair

## 2019-04-22 NOTE — DIETITIAN INITIAL EVALUATION ADULT. - ENERGY NEEDS
Ht: 70 inches (per past RD note), Wt: 220lbs, BMI: 31.6kg/m2, IBW:166 lbs +/- 10%, %IBW: 132.5%  Edema: none  Skin: free of pressure injuries per nursing flow sheets   Pertinent Information: This is a 78 M PMHx of HTN, HLD, chronic indwelling núñez, Parkinson's disease admitted with repeat urinary retention/núñez clogging and SIRS with unclear source with bacterial conjunctivitis. now s/p RRT (4/21) for episode of syncope while transferring from stretcher to chair following return from MRI. Neurology consulted,  no acute pathology noted, no further workup. Discharge planning pending MRI results.

## 2019-04-22 NOTE — CHART NOTE - NSCHARTNOTEFT_GEN_A_CORE
MRI T, L spine reviewed. No acute pathology.  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    - check orthostatics as parkinson can cause dysautonomia  - PT/OT  - F/u outpt with neurologist Dr. Damien Crow    - No further inpatient neurological workup warranted at this time      We will sign off.

## 2019-04-22 NOTE — DIETITIAN INITIAL EVALUATION ADULT. - OTHER INFO
Patient seen for Length Of Stay on 4MON. Pt reports UBW as ~230lbs a "few months ago", wife believes he may have lost some weight recently due to frequent hospitalizations. Weight per previous RD noted (3/25/19), wt noted as 215lbs, current dosing weight noted as 220lbs, suggesting some weight gain after admission. Since admission pt remains with fair appetite with PO intake varying from % of meal completion. No acute GI distress noted. Last BM 4/22. RD reviewed with pt/wife menu ordering procedures and alternative menu items available, pt declines drinkable supplements such as Ensure Enlive and Mighty Shakes, amendable to trying Thrive Gelato.

## 2019-04-23 LAB
ALBUMIN SERPL ELPH-MCNC: 2.9 G/DL — LOW (ref 3.3–5)
ALP SERPL-CCNC: 101 U/L — SIGNIFICANT CHANGE UP (ref 40–120)
ALT FLD-CCNC: <5 U/L — LOW (ref 10–45)
ANION GAP SERPL CALC-SCNC: 11 MMOL/L — SIGNIFICANT CHANGE UP (ref 5–17)
AST SERPL-CCNC: 25 U/L — SIGNIFICANT CHANGE UP (ref 10–40)
BILIRUB SERPL-MCNC: 0.2 MG/DL — SIGNIFICANT CHANGE UP (ref 0.2–1.2)
BUN SERPL-MCNC: 14 MG/DL — SIGNIFICANT CHANGE UP (ref 7–23)
CALCIUM SERPL-MCNC: 8.6 MG/DL — SIGNIFICANT CHANGE UP (ref 8.4–10.5)
CHLORIDE SERPL-SCNC: 110 MMOL/L — HIGH (ref 96–108)
CO2 SERPL-SCNC: 21 MMOL/L — LOW (ref 22–31)
CREAT SERPL-MCNC: 1.2 MG/DL — SIGNIFICANT CHANGE UP (ref 0.5–1.3)
GLUCOSE SERPL-MCNC: 94 MG/DL — SIGNIFICANT CHANGE UP (ref 70–99)
HCT VFR BLD CALC: 28.2 % — LOW (ref 39–50)
HGB BLD-MCNC: 8.6 G/DL — LOW (ref 13–17)
MCHC RBC-ENTMCNC: 28.3 PG — SIGNIFICANT CHANGE UP (ref 27–34)
MCHC RBC-ENTMCNC: 30.5 GM/DL — LOW (ref 32–36)
MCV RBC AUTO: 92.8 FL — SIGNIFICANT CHANGE UP (ref 80–100)
PLATELET # BLD AUTO: 459 K/UL — HIGH (ref 150–400)
POTASSIUM SERPL-MCNC: 3.7 MMOL/L — SIGNIFICANT CHANGE UP (ref 3.5–5.3)
POTASSIUM SERPL-SCNC: 3.7 MMOL/L — SIGNIFICANT CHANGE UP (ref 3.5–5.3)
PROT SERPL-MCNC: 6.3 G/DL — SIGNIFICANT CHANGE UP (ref 6–8.3)
RBC # BLD: 3.04 M/UL — LOW (ref 4.2–5.8)
RBC # FLD: 14.2 % — SIGNIFICANT CHANGE UP (ref 10.3–14.5)
SODIUM SERPL-SCNC: 142 MMOL/L — SIGNIFICANT CHANGE UP (ref 135–145)
WBC # BLD: 16.08 K/UL — HIGH (ref 3.8–10.5)
WBC # FLD AUTO: 16.08 K/UL — HIGH (ref 3.8–10.5)

## 2019-04-23 PROCEDURE — 76700 US EXAM ABDOM COMPLETE: CPT | Mod: 26

## 2019-04-23 RX ADMIN — CARBIDOPA AND LEVODOPA 2 CAPSULE(S): 25; 100 TABLET ORAL at 06:50

## 2019-04-23 RX ADMIN — Medication 1 APPLICATION(S): at 06:50

## 2019-04-23 RX ADMIN — Medication 100 MILLIGRAM(S): at 11:53

## 2019-04-23 RX ADMIN — FAMOTIDINE 20 MILLIGRAM(S): 10 INJECTION INTRAVENOUS at 11:53

## 2019-04-23 RX ADMIN — SIMVASTATIN 10 MILLIGRAM(S): 20 TABLET, FILM COATED ORAL at 22:38

## 2019-04-23 RX ADMIN — SENNA PLUS 2 TABLET(S): 8.6 TABLET ORAL at 22:38

## 2019-04-23 RX ADMIN — Medication 100 MILLIGRAM(S): at 06:50

## 2019-04-23 RX ADMIN — SERTRALINE 100 MILLIGRAM(S): 25 TABLET, FILM COATED ORAL at 11:53

## 2019-04-23 RX ADMIN — Medication 1 MILLIGRAM(S): at 11:53

## 2019-04-23 RX ADMIN — CARBIDOPA AND LEVODOPA 2 CAPSULE(S): 25; 100 TABLET ORAL at 17:46

## 2019-04-23 RX ADMIN — CARBIDOPA AND LEVODOPA 2 CAPSULE(S): 25; 100 TABLET ORAL at 23:43

## 2019-04-23 RX ADMIN — RASAGILINE 1 MILLIGRAM(S): 0.5 TABLET ORAL at 11:53

## 2019-04-23 RX ADMIN — Medication 100 MILLIGRAM(S): at 22:38

## 2019-04-23 RX ADMIN — Medication 100 MILLIGRAM(S): at 17:46

## 2019-04-23 RX ADMIN — CARBIDOPA AND LEVODOPA 2 CAPSULE(S): 25; 100 TABLET ORAL at 11:53

## 2019-04-23 RX ADMIN — Medication 1 APPLICATION(S): at 18:50

## 2019-04-23 RX ADMIN — LUBIPROSTONE 8 MICROGRAM(S): 24 CAPSULE, GELATIN COATED ORAL at 17:46

## 2019-04-23 RX ADMIN — Medication 650 MILLIGRAM(S): at 16:32

## 2019-04-23 RX ADMIN — Medication 650 MILLIGRAM(S): at 17:10

## 2019-04-23 RX ADMIN — LUBIPROSTONE 8 MICROGRAM(S): 24 CAPSULE, GELATIN COATED ORAL at 06:50

## 2019-04-23 NOTE — PROGRESS NOTE ADULT - PROBLEM SELECTOR PLAN 8
Sertraline 100mg PO daily

## 2019-04-23 NOTE — PROGRESS NOTE ADULT - PROBLEM SELECTOR PLAN 1
Has chronic núñez since March admission- he has hx of prostate cancer s/p radiation and TURPx 2.    -Now is on irrigation but still having leaking around the núñez insertion site and suprapubic pain.  -Urology consulted appreciated  -Was recently D/C'd off Flomax and told he will likely need life long núñez.    #Syncope  Sounds more like vasovagal from urinary retention and almost falling off of stretcher.  Low suspicion for seizure. Can monitor on tele for 24 hrs, trops negative x 2 both negative.  continue ASA and get TTE (but can be done outpt)
Has chronic núñez since March admission- he has hx of prostate cancer s/p radiation and TURPx 2.    -Now is on irrigation but still having leaking around the núñez insertion site and suprapubic pain.  -Urology consulted appreciated  -Was recently D/C'd off Flomax and told he will likely need life long núñez.  - gross hematuria being monitored. Urology states no CBI yet. d/c aspirin.   - leukocytosis likely reactive to hematuria. baseline mental status. UA neg x 2.    #Syncope  Sounds more like vasovagal from urinary retention and almost falling off of stretcher.  Low suspicion for seizure. Can monitor on tele for 24 hrs, trops negative x 2 both negative.  TTE grossly normal LV.
Has chronic núñez since March admission- he has hx of prostate cancer s/p radiation and TURPx 2.    -Now is on irrigation but still having leaking around the núñez insertion site and suprapubic pain.  -Urology consulted appreciated  -Was recently D/C'd off Flomax and told he will likely need life long núñez.  - gross hematuria being monitored. Urology states no CBI yet. d/c aspirin.   - leukocytosis likely reactive to hematuria. baseline mental status. UA neg x 2.    #Syncope  Sounds more like vasovagal from urinary retention and almost falling off of stretcher.  Low suspicion for seizure. Can monitor on tele for 24 hrs, trops negative x 2 both negative.  pending TTE (but can be done outpt)
Has chronic núñez since March admission- he has hx of prostate cancer s/p radiation and TURPx 2.    -Now is on irrigation but still having leaking around the núñez insertion site and suprapubic pain. -Urology consulted appreciated. fixed.   -Was recently D/C'd off Flomax and told he will likely need life long núñez.  - gross hematuria being monitored. Urology states no CBI yet. d/c aspirin.   - leukocytosis likely reactive to hematuria. baseline mental status. UA neg x 2.    #Syncope  Sounds more like vasovagal from urinary retention and almost falling off of stretcher.  Low suspicion for seizure. Can monitor on tele for 24 hrs, trops negative x 2 both negative.  TTE grossly normal LV.
Has chronic núñez since March admission- he has hx of prostate cancer s/p radiation and TURPx 2.    -he was on irrigation but still was having leaking around the núñez insertion site and suprapubic pain. -Urology consulted appreciated. leakage fixed.   -Was recently D/C'd off Flomax and told he will likely need life long núñez.  - gross hematuria being monitored. Urology states no CBI yet. d/c aspirin.   - leukocytosis likely reactive to hematuria. baseline mental status. UA neg x 2.    #Syncope  Sounds more like vasovagal from urinary retention and almost falling off of stretcher.  Low suspicion for seizure. Can monitor on tele for 24 hrs, trops negative x 2 both negative.  TTE grossly normal LV.
in the setting of advance Parkinsonism.  c/w IVF x 24 hrs  will consider Midodrine 2.5 mg TID if orthostatic not improved with IV hydration
in the setting of advance Parkinsonism.  s/p IVF x 24 hrs with improved orthostatics  can consider Midodrine 2.5 mg TID if needed. seems to be okay for now.  will dc IVF to avoid overload.   recheck orthostatics.
Has chronic núñez since March admission- he has hx of prostate cancer s/p radiation and TURPx 2.    -Now is on irrigation but still having leaking around the núñez insertion site and suprapubic pain. -Urology consulted appreciated. fixed.   -Was recently D/C'd off Flomax and told he will likely need life long núñez.  - gross hematuria being monitored. Urology states no CBI yet. d/c aspirin.   - leukocytosis likely reactive to hematuria. baseline mental status. UA neg x 2.    #Syncope  Sounds more like vasovagal from urinary retention and almost falling off of stretcher.  Low suspicion for seizure. Can monitor on tele for 24 hrs, trops negative x 2 both negative.  TTE grossly normal LV.

## 2019-04-23 NOTE — PROGRESS NOTE ADULT - PROBLEM SELECTOR PROBLEM 3
Metabolic acidosis
SIRS (systemic inflammatory response syndrome)
SIRS (systemic inflammatory response syndrome)
Metabolic acidosis

## 2019-04-23 NOTE — PROGRESS NOTE ADULT - PROBLEM SELECTOR PLAN 5
Has hx of this on past admissions, continue to monitor. Could be 2/2 dehydration in this instance.

## 2019-04-23 NOTE — PROGRESS NOTE ADULT - PROBLEM SELECTOR PLAN 9
Allopurinol 100mg PO daily

## 2019-04-23 NOTE — PROGRESS NOTE ADULT - ATTENDING COMMENTS
- Dr. JESUS Frankel ([facility])  - [phone]
- Dr. JESUS Frankel ([facility])  - [phone]
- Dr. JESUS Frankel ([facility])  - [phone]
- Dr. JESUS Frankel ([facility])  - [phone]
- Dr. JESUS Frankel ([facility])  - [phone]
Hima Valdez will be covering for me starting 4/24/19. He can be reached at  if needed.     - Dr. JESUS Frankel ([facility])  - [phone]
MRI spines pending.  if normal, likely dc planning to rehab.   urology follow up for hematuria.     - Dr. LEE et ([facility])  - [phone]
- Dr. JESUS Frankel ([facility])  - [phone]

## 2019-04-23 NOTE — PROGRESS NOTE ADULT - PROBLEM SELECTOR PROBLEM 7
Parkinson's disease

## 2019-04-23 NOTE — PROGRESS NOTE ADULT - PROBLEM SELECTOR PROBLEM 4
CKD stage G3b/A2, GFR 30-44 and albumin creatinine ratio  mg/g

## 2019-04-23 NOTE — PROGRESS NOTE ADULT - PROBLEM SELECTOR PLAN 6
if recurrent, check stool O/P and C diff- has recent antibiotic use and low grade fever with leukocytosis.
resolved.   if recurrent, check stool O/P and C diff- has recent antibiotic use and low grade fever with leukocytosis.
resolved.   if recurrent, check stool O/P and C diff- has recent antibiotic use and low grade fever with leukocytosis.
resolved.   now constipated. home Amitiza restarted BID.

## 2019-04-23 NOTE — PROGRESS NOTE ADULT - PROBLEM SELECTOR PLAN 10
holding due to gross hematuria  Dash diet  Keep mg > 2 K>4
holding for hematuria  Dash diet  Keep mg > 2 K>4
holding due to gross hematuria  Dash diet  Keep mg > 2 K>4

## 2019-04-23 NOTE — PROGRESS NOTE ADULT - PROBLEM SELECTOR PLAN 7
Pt needs his Rytari verified by pharmacy to take for extended release
Pt needs his Rytari verified by pharmacy to take for extended release  the wife requesting neurology eval.  LE weakness more like deconditioning due to frequent hospitalization.  recently with home PT program but hasn't helped.  likely need to go to rehab.
Pt needs his Rytari verified by pharmacy to take for extended release  the wife requesting neurology eval.  LE weakness more like deconditioning due to frequent hospitalization.  recently with home PT program but hasn't helped.  likely need to go to rehab.  Neuro on the case. eval appreciated.  MRI cervical spine done.  MRI thorac and lumbar ordered per neuro
Pt needs his Rytari verified by pharmacy to take for extended release  the wife requesting neurology eval.  LE weakness more like deconditioning due to frequent hospitalization.  recently with home PT program but hasn't helped.  likely need to go to rehab.  Neuro on the case. eval appreciated.  MRI cervical spine ordered.
Pt needs his Rytari verified by pharmacy to take for extended release  the wife requesting neurology eval.  LE weakness more like deconditioning due to frequent hospitalization.  recently with home PT program but hasn't helped.  likely need to go to rehab.  Neuro on the case. eval appreciated.  MRI cervical spine ordered.

## 2019-04-23 NOTE — PROGRESS NOTE ADULT - PROBLEM SELECTOR PLAN 3
CXR negative, UA negative, no more diarrhea  RVP neg  #bacterial conjunctivitis: Erythromycin ointment
CXR negative, UA negative, no more diarrhea  RVP neg  #bacterial conjunctivitis: Erythromycin ointment
Likely due to diarrhea, continue to monitor and stool studies as below.  #anemia of chronic disease  -Mild hematuria, likely dehydrated and falsely elevated. Will repeat CBC in am  -Transfuse < 7
Likely due to diarrhea, continue to monitor and stool studies as below.  #anemia of chronic disease  -Mild hematuria, likely dehydrated and falsely elevated. Will repeat CBC in am  -Transfuse < 7  supplemented vit B12 and folic.

## 2019-04-23 NOTE — PROGRESS NOTE ADULT - NSHPATTENDINGPLANDISCUSS_GEN_ALL_CORE
edgardo and AMY Horton
edgardo and AMY Horton
pt and AMY De Paz
pt and NP
pt and NP
pt and NP, d/w the wife
pt and NP, d/w the wife
pt and NP [place]. d/w the wife at bedside

## 2019-04-23 NOTE — PROGRESS NOTE ADULT - PROBLEM SELECTOR PLAN 2
CXR negative, UA negative, no more diarrhea  RVP neg  #bacterial conjunctivitis: Erythromycin ointment
Has chronic núñez since March admission- he has hx of prostate cancer s/p radiation and TURPx 2.    -he was on irrigation but still was having leaking around the núñez insertion site and suprapubic pain. -Urology consulted. leakage fixed.   - Was recently D/C'd off Flomax and told he will likely need life long núñez.  - gross hematuria being monitored. Urology states no CBI yet. off aspirin.   - leukocytosis likely reactive to hematuria. baseline mental status. UA neg x 2.    #Syncope  likely vasovagal from urinary retention and almost falling off of stretcher.  Low suspicion for seizure. Can monitor on tele for 24 hrs, trops negative x 2 both negative.  TTE grossly normal LV.
Has chronic núñez since March admission- he has hx of prostate cancer s/p radiation and TURPx 2.    -he was on irrigation but still was having leaking around the núñez insertion site and suprapubic pain. -Urology consulted. leakage fixed.   - Was recently D/C'd off Flomax and told he will likely need life long núñez.  - gross hematuria being monitored. Urology states no CBI yet. off aspirin.   - leukocytosis likely reactive to hematuria. baseline mental status. UA neg x 2.    #Syncope  likely vasovagal from urinary retention and almost falling off of stretcher.  Low suspicion for seizure. Can monitor on tele for 24 hrs, trops negative x 2 both negative.  TTE grossly normal LV.
CXR negative, UA negative, no more diarrhea  RVP neg  #bacterial conjunctivitis: Erythromycin ointment

## 2019-04-23 NOTE — PROGRESS NOTE ADULT - PROBLEM SELECTOR PROBLEM 2
SIRS (systemic inflammatory response syndrome)
Urinary retention
Urinary retention
SIRS (systemic inflammatory response syndrome)

## 2019-04-24 ENCOUNTER — TRANSCRIPTION ENCOUNTER (OUTPATIENT)
Age: 79
End: 2019-04-24

## 2019-04-24 VITALS
RESPIRATION RATE: 18 BRPM | SYSTOLIC BLOOD PRESSURE: 113 MMHG | TEMPERATURE: 98 F | HEART RATE: 85 BPM | OXYGEN SATURATION: 96 % | DIASTOLIC BLOOD PRESSURE: 71 MMHG

## 2019-04-24 LAB
ANION GAP SERPL CALC-SCNC: 11 MMOL/L — SIGNIFICANT CHANGE UP (ref 5–17)
BUN SERPL-MCNC: 13 MG/DL — SIGNIFICANT CHANGE UP (ref 7–23)
CALCIUM SERPL-MCNC: 9 MG/DL — SIGNIFICANT CHANGE UP (ref 8.4–10.5)
CHLORIDE SERPL-SCNC: 109 MMOL/L — HIGH (ref 96–108)
CO2 SERPL-SCNC: 22 MMOL/L — SIGNIFICANT CHANGE UP (ref 22–31)
CREAT SERPL-MCNC: 1.1 MG/DL — SIGNIFICANT CHANGE UP (ref 0.5–1.3)
GLUCOSE SERPL-MCNC: 86 MG/DL — SIGNIFICANT CHANGE UP (ref 70–99)
HCT VFR BLD CALC: 28 % — LOW (ref 39–50)
HGB BLD-MCNC: 8.4 G/DL — LOW (ref 13–17)
MAGNESIUM SERPL-MCNC: 2 MG/DL — SIGNIFICANT CHANGE UP (ref 1.6–2.6)
MCHC RBC-ENTMCNC: 27.7 PG — SIGNIFICANT CHANGE UP (ref 27–34)
MCHC RBC-ENTMCNC: 30 GM/DL — LOW (ref 32–36)
MCV RBC AUTO: 92.4 FL — SIGNIFICANT CHANGE UP (ref 80–100)
METHYLMALONATE SERPL-SCNC: 167 NMOL/L — SIGNIFICANT CHANGE UP (ref 0–378)
PHOSPHATE SERPL-MCNC: 3.2 MG/DL — SIGNIFICANT CHANGE UP (ref 2.5–4.5)
PLATELET # BLD AUTO: 500 K/UL — HIGH (ref 150–400)
POTASSIUM SERPL-MCNC: 3.9 MMOL/L — SIGNIFICANT CHANGE UP (ref 3.5–5.3)
POTASSIUM SERPL-SCNC: 3.9 MMOL/L — SIGNIFICANT CHANGE UP (ref 3.5–5.3)
RBC # BLD: 3.03 M/UL — LOW (ref 4.2–5.8)
RBC # FLD: 14.3 % — SIGNIFICANT CHANGE UP (ref 10.3–14.5)
SODIUM SERPL-SCNC: 142 MMOL/L — SIGNIFICANT CHANGE UP (ref 135–145)
WBC # BLD: 16.02 K/UL — HIGH (ref 3.8–10.5)
WBC # FLD AUTO: 16.02 K/UL — HIGH (ref 3.8–10.5)

## 2019-04-24 PROCEDURE — 85014 HEMATOCRIT: CPT

## 2019-04-24 PROCEDURE — 85730 THROMBOPLASTIN TIME PARTIAL: CPT

## 2019-04-24 PROCEDURE — 93306 TTE W/DOPPLER COMPLETE: CPT

## 2019-04-24 PROCEDURE — 87798 DETECT AGENT NOS DNA AMP: CPT

## 2019-04-24 PROCEDURE — 87486 CHLMYD PNEUM DNA AMP PROBE: CPT

## 2019-04-24 PROCEDURE — 84443 ASSAY THYROID STIM HORMONE: CPT

## 2019-04-24 PROCEDURE — 82947 ASSAY GLUCOSE BLOOD QUANT: CPT

## 2019-04-24 PROCEDURE — 82607 VITAMIN B-12: CPT

## 2019-04-24 PROCEDURE — 80053 COMPREHEN METABOLIC PANEL: CPT

## 2019-04-24 PROCEDURE — 72141 MRI NECK SPINE W/O DYE: CPT

## 2019-04-24 PROCEDURE — 87633 RESP VIRUS 12-25 TARGETS: CPT

## 2019-04-24 PROCEDURE — 84132 ASSAY OF SERUM POTASSIUM: CPT

## 2019-04-24 PROCEDURE — 83605 ASSAY OF LACTIC ACID: CPT

## 2019-04-24 PROCEDURE — 82803 BLOOD GASES ANY COMBINATION: CPT

## 2019-04-24 PROCEDURE — 83036 HEMOGLOBIN GLYCOSYLATED A1C: CPT

## 2019-04-24 PROCEDURE — 82330 ASSAY OF CALCIUM: CPT

## 2019-04-24 PROCEDURE — 51700 IRRIGATION OF BLADDER: CPT

## 2019-04-24 PROCEDURE — 84100 ASSAY OF PHOSPHORUS: CPT

## 2019-04-24 PROCEDURE — 82962 GLUCOSE BLOOD TEST: CPT

## 2019-04-24 PROCEDURE — 72146 MRI CHEST SPINE W/O DYE: CPT

## 2019-04-24 PROCEDURE — 71045 X-RAY EXAM CHEST 1 VIEW: CPT

## 2019-04-24 PROCEDURE — 81001 URINALYSIS AUTO W/SCOPE: CPT

## 2019-04-24 PROCEDURE — 99285 EMERGENCY DEPT VISIT HI MDM: CPT

## 2019-04-24 PROCEDURE — 87186 SC STD MICRODIL/AGAR DIL: CPT

## 2019-04-24 PROCEDURE — 84439 ASSAY OF FREE THYROXINE: CPT

## 2019-04-24 PROCEDURE — 84484 ASSAY OF TROPONIN QUANT: CPT

## 2019-04-24 PROCEDURE — 86255 FLUORESCENT ANTIBODY SCREEN: CPT

## 2019-04-24 PROCEDURE — 72148 MRI LUMBAR SPINE W/O DYE: CPT

## 2019-04-24 PROCEDURE — 76700 US EXAM ABDOM COMPLETE: CPT

## 2019-04-24 PROCEDURE — 97116 GAIT TRAINING THERAPY: CPT

## 2019-04-24 PROCEDURE — 85027 COMPLETE CBC AUTOMATED: CPT

## 2019-04-24 PROCEDURE — 86340 INTRINSIC FACTOR ANTIBODY: CPT

## 2019-04-24 PROCEDURE — 87581 M.PNEUMON DNA AMP PROBE: CPT

## 2019-04-24 PROCEDURE — 82435 ASSAY OF BLOOD CHLORIDE: CPT

## 2019-04-24 PROCEDURE — 97530 THERAPEUTIC ACTIVITIES: CPT

## 2019-04-24 PROCEDURE — 80048 BASIC METABOLIC PNL TOTAL CA: CPT

## 2019-04-24 PROCEDURE — 82565 ASSAY OF CREATININE: CPT

## 2019-04-24 PROCEDURE — 84295 ASSAY OF SERUM SODIUM: CPT

## 2019-04-24 PROCEDURE — 83735 ASSAY OF MAGNESIUM: CPT

## 2019-04-24 PROCEDURE — 85610 PROTHROMBIN TIME: CPT

## 2019-04-24 PROCEDURE — 87086 URINE CULTURE/COLONY COUNT: CPT

## 2019-04-24 PROCEDURE — 82746 ASSAY OF FOLIC ACID SERUM: CPT

## 2019-04-24 PROCEDURE — 93005 ELECTROCARDIOGRAM TRACING: CPT

## 2019-04-24 PROCEDURE — 83921 ORGANIC ACID SINGLE QUANT: CPT

## 2019-04-24 PROCEDURE — 97162 PT EVAL MOD COMPLEX 30 MIN: CPT

## 2019-04-24 PROCEDURE — 83880 ASSAY OF NATRIURETIC PEPTIDE: CPT

## 2019-04-24 RX ORDER — LUBIPROSTONE 24 UG/1
1 CAPSULE, GELATIN COATED ORAL
Qty: 0 | Refills: 0 | COMMUNITY

## 2019-04-24 RX ORDER — LIDOCAINE 4 G/100G
10 CREAM TOPICAL
Qty: 0 | Refills: 0 | COMMUNITY

## 2019-04-24 RX ORDER — RASAGILINE 0.5 MG/1
1 TABLET ORAL
Qty: 0 | Refills: 0 | COMMUNITY
Start: 2019-04-24

## 2019-04-24 RX ORDER — SIMVASTATIN 20 MG/1
1 TABLET, FILM COATED ORAL
Qty: 0 | Refills: 0 | COMMUNITY
Start: 2019-04-24

## 2019-04-24 RX ORDER — FAMOTIDINE 10 MG/ML
1 INJECTION INTRAVENOUS
Qty: 0 | Refills: 0 | COMMUNITY
Start: 2019-04-24

## 2019-04-24 RX ORDER — SENNA PLUS 8.6 MG/1
2 TABLET ORAL
Qty: 0 | Refills: 0 | COMMUNITY
Start: 2019-04-24

## 2019-04-24 RX ORDER — ASPIRIN/CALCIUM CARB/MAGNESIUM 324 MG
1 TABLET ORAL
Qty: 0 | Refills: 0 | COMMUNITY

## 2019-04-24 RX ORDER — ALLOPURINOL 300 MG
1 TABLET ORAL
Qty: 0 | Refills: 0 | COMMUNITY
Start: 2019-04-24

## 2019-04-24 RX ORDER — LUBIPROSTONE 24 UG/1
1 CAPSULE, GELATIN COATED ORAL
Qty: 0 | Refills: 0 | COMMUNITY
Start: 2019-04-24

## 2019-04-24 RX ORDER — CARBIDOPA AND LEVODOPA 25; 100 MG/1; MG/1
2 TABLET ORAL
Qty: 0 | Refills: 0 | COMMUNITY
Start: 2019-04-24

## 2019-04-24 RX ORDER — POLYETHYLENE GLYCOL 3350 17 G/17G
17 POWDER, FOR SOLUTION ORAL
Qty: 0 | Refills: 0 | COMMUNITY
Start: 2019-04-24

## 2019-04-24 RX ORDER — FAMOTIDINE 10 MG/ML
1 INJECTION INTRAVENOUS
Qty: 0 | Refills: 0 | COMMUNITY

## 2019-04-24 RX ORDER — SERTRALINE 25 MG/1
1 TABLET, FILM COATED ORAL
Qty: 0 | Refills: 0 | COMMUNITY
Start: 2019-04-24

## 2019-04-24 RX ORDER — ALPRAZOLAM 0.25 MG
0.5 TABLET ORAL
Qty: 0 | Refills: 0 | COMMUNITY

## 2019-04-24 RX ORDER — ACETAMINOPHEN 500 MG
2 TABLET ORAL
Qty: 0 | Refills: 0 | COMMUNITY
Start: 2019-04-24

## 2019-04-24 RX ORDER — FOLIC ACID 0.8 MG
1 TABLET ORAL
Qty: 0 | Refills: 0 | COMMUNITY
Start: 2019-04-24

## 2019-04-24 RX ORDER — LIDOCAINE 4 G/100G
1 CREAM TOPICAL
Qty: 0 | Refills: 0 | COMMUNITY
Start: 2019-04-24

## 2019-04-24 RX ORDER — DOCUSATE SODIUM 100 MG
1 CAPSULE ORAL
Qty: 0 | Refills: 0 | COMMUNITY
Start: 2019-04-24

## 2019-04-24 RX ORDER — AMANTADINE HCL 100 MG
1 CAPSULE ORAL
Qty: 0 | Refills: 0 | COMMUNITY
Start: 2019-04-24

## 2019-04-24 RX ADMIN — CARBIDOPA AND LEVODOPA 2 CAPSULE(S): 25; 100 TABLET ORAL at 13:00

## 2019-04-24 RX ADMIN — Medication 1 MILLIGRAM(S): at 13:00

## 2019-04-24 RX ADMIN — SERTRALINE 100 MILLIGRAM(S): 25 TABLET, FILM COATED ORAL at 13:00

## 2019-04-24 RX ADMIN — RASAGILINE 1 MILLIGRAM(S): 0.5 TABLET ORAL at 13:00

## 2019-04-24 RX ADMIN — Medication 1 APPLICATION(S): at 05:30

## 2019-04-24 RX ADMIN — Medication 100 MILLIGRAM(S): at 13:00

## 2019-04-24 RX ADMIN — CARBIDOPA AND LEVODOPA 2 CAPSULE(S): 25; 100 TABLET ORAL at 05:30

## 2019-04-24 RX ADMIN — FAMOTIDINE 20 MILLIGRAM(S): 10 INJECTION INTRAVENOUS at 13:00

## 2019-04-24 RX ADMIN — Medication 100 MILLIGRAM(S): at 05:30

## 2019-04-24 RX ADMIN — Medication 100 MILLIGRAM(S): at 05:31

## 2019-04-24 RX ADMIN — LUBIPROSTONE 8 MICROGRAM(S): 24 CAPSULE, GELATIN COATED ORAL at 05:31

## 2019-04-24 NOTE — DISCHARGE NOTE NURSING/CASE MANAGEMENT/SOCIAL WORK - NSDCFUADDAPPT_GEN_ALL_CORE_FT
Follow-up with your Primary Care Doctor within one week of discharge  Follow-up with your Urologist within one to two weeks of discharge  Follow-up with your Neurologist for ongoing management.

## 2019-04-24 NOTE — PROGRESS NOTE ADULT - SUBJECTIVE AND OBJECTIVE BOX
*************************************  NEUROLOGY FOLLOW UP NOTE  **************************************    TANK VARGAS  Male  MRN-[id number]    Subjective: Sitting in chair, reports feeling much better than before. Noted with continued Left sided dyskinetic movements.      VITAL SIGNS:  Vital Signs Last 24 Hrs  T(C): 36.9 (20 Apr 2019 05:09), Max: 36.9 (19 Apr 2019 11:24)  T(F): 98.4 (20 Apr 2019 05:09), Max: 98.5 (19 Apr 2019 11:24)  HR: 69 (20 Apr 2019 05:09) (63 - 69)  BP: 147/78 (20 Apr 2019 05:09) (127/77 - 157/90)  BP(mean): --  RR: 18 (20 Apr 2019 05:09) (18 - 21)  SpO2: 94% (20 Apr 2019 05:09) (94% - 97%)    PHYSICAL EXAMINATION:  General: elderly, disheveled, cooperative, dyskinetic   Eyes: Left eye with mild purulence.  Neck: Supple, nontender  Skin: no rash, no edema noted  Lung: no respiratory distress noted  Neurologic:  - Mental Status:  Alert, awake, oriented to person, place, president, not to time; Speech moderately dysarthric (missing dentures) is fluent with intact naming, repetition, and comprehension; crosses midline, no extinction or neglect noted; Some memory issues, unable to tell exactly why in hospital, but knows general course of events over past 2 months.  - Cranial Nerves II-XII:  VFF, No nystagmus or APD noted, EOMI, PERRLA, V1-V3 intact, no facial asymmetry, t/p midline, SCM/trap intact.  - Motor:  Strength is 5/5 throughout.  There is no pronator drift.  Normal muscle bulk and tone throughout. There is intermittent low amplitude tremor L>R UE but no cogwheeling rigidity, tone appears normal in UE; there is increased rigidity in both LE L>R as well as occasional tremor in LLE.  - Reflexes:  trace and symmetric at the biceps, triceps, brachioradialis, knees, and ankles.  Plantars extensor b/l  - Sensory:  Intact to light touch. Impaired vibration in LE.  - Coordination:  Finger-nose-finger without dysmetria.  Rapid alternating hand movements intact.  - Gait:   Was able to stand up with assistance, but did not walk said feels unsteady.    LABS:                          9.0    13.58 )-----------( 498      ( 19 Apr 2019 10:16 )             28.7     04-20    142  |  104  |  17  ----------------------------<  106<H>  3.4<L>   |  24  |  1.29    Ca    9.3      20 Apr 2019 07:05  Phos  3.5     04-19  Mg     1.9     04-19    TPro  7.0  /  Alb  3.5  /  TBili  0.2  /  DBili  x   /  AST  36  /  ALT  <5<L>  /  AlkPhos  104  04-20        RADIOLOGY & ADDITIONAL STUDIES:    < from: MR Cervical Spine No Cont (04.19.19 @ 20:27) >  IMPRESSION:    Limited visualization of the brain demonstrates enlarged ventricles and   sulci with bifrontal diameter of 5.2 cm, volume loss and prominent CSF   spaces in the posterior fossa with areas of microvascular disease.    Unremarkable alignment, preservation of vertebral height, degenerative   change most pronounced at C5-6 with disc osteophyte ridge with far right   lateral extension, uncovertebral hypertrophy causing marked right   foraminal narrowing and moderate to marked left foraminal narrowing, with   remaining levels disease above.    < end of copied text >
Called by primary team to evaluate núñez for dark maroon urine in tube.  Recent RRT for syncope.  Núñez was still draining. Patient denies abdominal pain. Patient manually irrigated and found to not have any clots in the bladder. No signs of active bleeding. Minimal sediment. Likely old blood from traumatic núñez placement.  Urine now very light pink.    Plan   Núñez management per primary team. No indication for CBI.   Please irrigate núñez with 60cc NS q shift.
Patient is a 78y old  Male who presents with a chief complaint of Urinary retention (17 Apr 2019 18:12)      SUBJECTIVE / OVERNIGHT EVENTS:  awake alert.  sharp mental status.  no delirium today.  denied pain.  núñez with gross hematuria. aspirin stopped.       Vital Signs Last 24 Hrs  T(C): 36.7 (17 Apr 2019 11:15), Max: 36.7 (17 Apr 2019 04:03)  T(F): 98.1 (17 Apr 2019 11:15), Max: 98.1 (17 Apr 2019 11:15)  HR: 63 (17 Apr 2019 11:15) (63 - 76)  BP: 132/74 (17 Apr 2019 11:15) (132/74 - 152/75)  BP(mean): --  RR: 18 (17 Apr 2019 11:15) (18 - 18)  SpO2: 95% (17 Apr 2019 11:15) (95% - 97%)  I&O's Summary    16 Apr 2019 07:01  -  17 Apr 2019 07:00  --------------------------------------------------------  IN: 240 mL / OUT: 3400 mL / NET: -3160 mL    17 Apr 2019 07:01  -  17 Apr 2019 20:42  --------------------------------------------------------  IN: 480 mL / OUT: 400 mL / NET: 80 mL        PHYSICAL EXAM:  GENERAL: NAD, Comfortable, lying in bed, comfortable  HEAD:  Atraumatic, Normocephalic  EYES: EOMI, PERRLA, conjunctiva and sclera clear  NECK: Supple, No JVD  CHEST/LUNG: Clear to auscultation bilaterally; No wheeze  HEART: Regular rate and rhythm; No murmurs, rubs, or gallops  ABDOMEN: Soft, Nontender, Nondistended; Bowel sounds present  Neuro: Alert awake, no focal deficit, Parkinson features  : Núñez in place, +Gross hematuria, neg CVAT   EXTREMITIES:  2+ Peripheral Pulses, No clubbing, cyanosis, or edema  SKIN: No rashes or lesions      LABS:                        9.1    18.20 )-----------( 462      ( 17 Apr 2019 09:37 )             30.0     04-17    141  |  105  |  16  ----------------------------<  84  3.9   |  21<L>  |  1.09    Ca    8.9      17 Apr 2019 06:01  Mg     1.8     04-16        CAPILLARY BLOOD GLUCOSE                RADIOLOGY & ADDITIONAL TESTS:    Imaging Personally Reviewed:  [x] YES  [ ] NO    Consultant(s) Notes Reviewed:  [x] YES  [ ] NO      MEDICATIONS  (STANDING):  allopurinol 100 milliGRAM(s) Oral daily  amantadine 100 milliGRAM(s) Oral two times a day  carbidopa 36.25 mG/levodopa 145 mG ER 2 Capsule(s) Oral every 6 hours  erythromycin   Ointment 1 Application(s) Both EYES two times a day  famotidine    Tablet 20 milliGRAM(s) Oral daily  rasagiline Tablet 1 milliGRAM(s) Oral daily  sertraline 100 milliGRAM(s) Oral daily  simvastatin 10 milliGRAM(s) Oral at bedtime    MEDICATIONS  (PRN):  ALPRAZolam 0.25 milliGRAM(s) Oral daily PRN anxiety  lidocaine 2% Gel 1 Application(s) Topical two times a day PRN pain      Care Discussed with Consultants/Other Providers [x] YES  [ ] NO    HEALTH ISSUES - PROBLEM Dx:  Need for prophylactic measure: Need for prophylactic measure  Gout: Gout  Depression: SEEK IMMEDIATE CARE IF  You have thoughts about hurting yourself or others.  You lose touch with reality (have psychotic symptoms).  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  Have all medications adjusted for your renal function by your Health Care Provider.  Blood pressure control is important.  Take all medication as prescribed.    Metabolic acidosis: Metabolic acidosis  SIRS (systemic inflammatory response syndrome)  Urinary retention: Continue with núñez catheter  Follow-up with Urology
Patient is a 78y old  Male who presents with a chief complaint of Urinary retention (18 Apr 2019 13:07)      SUBJECTIVE / OVERNIGHT EVENTS:  no events.  feel well.   núñez still with hematuria.  seen by neuro.  mri cervical ordered.         Vital Signs Last 24 Hrs  T(C): 36.6 (18 Apr 2019 11:02), Max: 36.9 (17 Apr 2019 22:06)  T(F): 97.9 (18 Apr 2019 11:02), Max: 98.5 (17 Apr 2019 22:06)  HR: 65 (18 Apr 2019 11:02) (65 - 96)  BP: 124/77 (18 Apr 2019 11:02) (124/77 - 155/87)  BP(mean): --  RR: 18 (18 Apr 2019 11:02) (18 - 18)  SpO2: 95% (18 Apr 2019 11:02) (95% - 96%)  I&O's Summary    17 Apr 2019 07:01  -  18 Apr 2019 07:00  --------------------------------------------------------  IN: 960 mL / OUT: 1350 mL / NET: -390 mL    18 Apr 2019 07:01  -  18 Apr 2019 18:56  --------------------------------------------------------  IN: 960 mL / OUT: 300 mL / NET: 660 mL      PHYSICAL EXAM:  GENERAL: NAD, Comfortable, lying in bed, comfortable  HEAD:  Atraumatic, Normocephalic  EYES: EOMI, PERRLA, conjunctiva and sclera clear  NECK: Supple, No JVD  CHEST/LUNG: Clear to auscultation bilaterally; No wheeze  HEART: Regular rate and rhythm; No murmurs, rubs, or gallops  ABDOMEN: Soft, Nontender, Nondistended; Bowel sounds present  Neuro: Alert awake, no focal deficit, Parkinson features  : Núñez in place, +Gross hematuria, neg CVAT   EXTREMITIES:  2+ Peripheral Pulses, No clubbing, cyanosis, or edema  SKIN: No rashes or lesions    LABS:                        8.9    14.91 )-----------( 459      ( 18 Apr 2019 07:41 )             29.3     04-18    140  |  105  |  16  ----------------------------<  86  3.6   |  21<L>  |  1.10    Ca    9.0      18 Apr 2019 06:05        CAPILLARY BLOOD GLUCOSE                RADIOLOGY & ADDITIONAL TESTS:    Imaging Personally Reviewed:  [x] YES  [ ] NO    Consultant(s) Notes Reviewed:  [x] YES  [ ] NO      MEDICATIONS  (STANDING):  allopurinol 100 milliGRAM(s) Oral daily  amantadine 100 milliGRAM(s) Oral two times a day  carbidopa 36.25 mG/levodopa 145 mG ER 2 Capsule(s) Oral every 6 hours  erythromycin   Ointment 1 Application(s) Both EYES two times a day  famotidine    Tablet 20 milliGRAM(s) Oral daily  rasagiline Tablet 1 milliGRAM(s) Oral daily  sertraline 100 milliGRAM(s) Oral daily  simvastatin 10 milliGRAM(s) Oral at bedtime    MEDICATIONS  (PRN):  ALPRAZolam 0.25 milliGRAM(s) Oral daily PRN anxiety  lidocaine 2% Gel 1 Application(s) Topical two times a day PRN pain      Care Discussed with Consultants/Other Providers [x] YES  [ ] NO    HEALTH ISSUES - PROBLEM Dx:  Need for prophylactic measure: Need for prophylactic measure  Gout: Gout  Depression: SEEK IMMEDIATE CARE IF  You have thoughts about hurting yourself or others.  You lose touch with reality (have psychotic symptoms).  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  Have all medications adjusted for your renal function by your Health Care Provider.  Blood pressure control is important.  Take all medication as prescribed.    Metabolic acidosis: Metabolic acidosis  SIRS (systemic inflammatory response syndrome)  Urinary retention: Continue with núñez catheter  Follow-up with Urology
Patient is a 78y old  Male who presents with a chief complaint of Urinary retention (20 Apr 2019 10:05)      SUBJECTIVE / OVERNIGHT EVENTS:  stable.  no events on tele  no cp, no sob, no n/v/d. no abdominal pain.  no headache, no dizziness.   MRI spine ordered      Vital Signs Last 24 Hrs  T(C): 36.8 (20 Apr 2019 11:24), Max: 36.9 (20 Apr 2019 05:09)  T(F): 98.2 (20 Apr 2019 11:24), Max: 98.4 (20 Apr 2019 05:09)  HR: 68 (20 Apr 2019 11:24) (68 - 69)  BP: 115/74 (20 Apr 2019 11:24) (115/74 - 147/78)  BP(mean): --  RR: 18 (20 Apr 2019 11:24) (18 - 18)  SpO2: 94% (20 Apr 2019 11:24) (94% - 94%)  I&O's Summary    19 Apr 2019 07:01  -  20 Apr 2019 07:00  --------------------------------------------------------  IN: 480 mL / OUT: 300 mL / NET: 180 mL    20 Apr 2019 07:01  -  20 Apr 2019 21:15  --------------------------------------------------------  IN: 480 mL / OUT: 400 mL / NET: 80 mL      PHYSICAL EXAM:  GENERAL: NAD, Comfortable, lying in bed, comfortable  HEAD:  Atraumatic, Normocephalic  EYES: EOMI, PERRLA, conjunctiva and sclera clear  NECK: Supple, No JVD  CHEST/LUNG: Clear to auscultation bilaterally; No wheeze  HEART: Regular rate and rhythm; No murmurs, rubs, or gallops  ABDOMEN: Soft, Nontender, Nondistended; Bowel sounds present  Neuro: Alert awake, no focal deficit, Parkinson features  : Núñez in place, less hematuria, dark brown, neg CVAT, normal male genital without erythema, fissure/cut/bruise etc  EXTREMITIES:  2+ Peripheral Pulses, No clubbing, cyanosis, or edema  SKIN: No rashes or lesions    LABS:                        9.2    14.48 )-----------( 517      ( 20 Apr 2019 10:28 )             30.5     04-20    142  |  104  |  17  ----------------------------<  106<H>  3.4<L>   |  24  |  1.29    Ca    9.3      20 Apr 2019 07:05  Phos  3.5     04-19  Mg     1.9     04-19    TPro  7.0  /  Alb  3.5  /  TBili  0.2  /  DBili  x   /  AST  36  /  ALT  <5<L>  /  AlkPhos  104  04-20      CAPILLARY BLOOD GLUCOSE                RADIOLOGY & ADDITIONAL TESTS:    Imaging Personally Reviewed:  [x] YES  [ ] NO    Consultant(s) Notes Reviewed:  [x] YES  [ ] NO      MEDICATIONS  (STANDING):  allopurinol 100 milliGRAM(s) Oral daily  amantadine 100 milliGRAM(s) Oral two times a day  carbidopa 36.25 mG/levodopa 145 mG ER 2 Capsule(s) Oral every 6 hours  docusate sodium 100 milliGRAM(s) Oral three times a day  erythromycin   Ointment 1 Application(s) Both EYES two times a day  famotidine    Tablet 20 milliGRAM(s) Oral daily  folic acid 1 milliGRAM(s) Oral daily  lubiprostone 8 MICROGram(s) Oral two times a day  rasagiline Tablet 1 milliGRAM(s) Oral daily  senna 2 Tablet(s) Oral at bedtime  sertraline 100 milliGRAM(s) Oral daily  simvastatin 10 milliGRAM(s) Oral at bedtime    MEDICATIONS  (PRN):  acetaminophen   Tablet .. 650 milliGRAM(s) Oral every 6 hours PRN Mild Pain (1 - 3)  ALPRAZolam 0.25 milliGRAM(s) Oral daily PRN anxiety  lidocaine 2% Gel 1 Application(s) Topical two times a day PRN pain  polyethylene glycol 3350 17 Gram(s) Oral daily PRN Constipation      Care Discussed with Consultants/Other Providers [x] YES  [ ] NO    HEALTH ISSUES - PROBLEM Dx:  Need for prophylactic measure: Need for prophylactic measure  Gout: Gout  Depression: SEEK IMMEDIATE CARE IF  You have thoughts about hurting yourself or others.  You lose touch with reality (have psychotic symptoms).  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  Have all medications adjusted for your renal function by your Health Care Provider.  Blood pressure control is important.  Take all medication as prescribed.    Metabolic acidosis: Metabolic acidosis  SIRS (systemic inflammatory response syndrome)  Urinary retention: Continue with núñez catheter  Follow-up with Urology
Patient is a 78y old  Male who presents with a chief complaint of Urinary retention (20 Apr 2019 21:15)      SUBJECTIVE / OVERNIGHT EVENTS:  feels well.  denied pain.  pending MRI spines      Vital Signs Last 24 Hrs  T(C): 36.4 (21 Apr 2019 03:44), Max: 36.8 (20 Apr 2019 11:24)  T(F): 97.5 (21 Apr 2019 03:44), Max: 98.2 (20 Apr 2019 11:24)  HR: 59 (21 Apr 2019 03:44) (59 - 68)  BP: 150/82 (21 Apr 2019 03:44) (115/74 - 153/84)  BP(mean): --  RR: 18 (21 Apr 2019 03:44) (18 - 18)  SpO2: 95% (21 Apr 2019 03:44) (94% - 96%)  I&O's Summary    20 Apr 2019 07:01  -  21 Apr 2019 07:00  --------------------------------------------------------  IN: 480 mL / OUT: 400 mL / NET: 80 mL    21 Apr 2019 07:01  -  21 Apr 2019 08:12  --------------------------------------------------------  IN: 0 mL / OUT: 200 mL / NET: -200 mL        PHYSICAL EXAM:  GENERAL: NAD, Comfortable, lying in bed, comfortable  HEAD:  Atraumatic, Normocephalic  EYES: EOMI, PERRLA, conjunctiva and sclera clear  NECK: Supple, No JVD  CHEST/LUNG: Clear to auscultation bilaterally; No wheeze  HEART: Regular rate and rhythm; No murmurs, rubs, or gallops  ABDOMEN: Soft, Nontender, Nondistended; Bowel sounds present  Neuro: Alert awake, no focal deficit, Parkinson features  : Núñez in place, less hematuria, dark brown, neg CVAT, normal male genital without erythema, fissure/cut/bruise etc  EXTREMITIES:  2+ Peripheral Pulses, No clubbing, cyanosis, or edema  SKIN: No rashes or lesions      LABS:                        9.2    14.48 )-----------( 517      ( 20 Apr 2019 10:28 )             30.5     04-21    141  |  107  |  19  ----------------------------<  93  3.9   |  24  |  1.27    Ca    9.1      21 Apr 2019 06:55  Mg     2.1     04-21    TPro  7.1  /  Alb  3.4  /  TBili  0.2  /  DBili  x   /  AST  31  /  ALT  <5<L>  /  AlkPhos  105  04-21      CAPILLARY BLOOD GLUCOSE                RADIOLOGY & ADDITIONAL TESTS:    Imaging Personally Reviewed:  [x] YES  [ ] NO    Consultant(s) Notes Reviewed:  [x] YES  [ ] NO      MEDICATIONS  (STANDING):  allopurinol 100 milliGRAM(s) Oral daily  amantadine 100 milliGRAM(s) Oral two times a day  carbidopa 36.25 mG/levodopa 145 mG ER 2 Capsule(s) Oral every 6 hours  docusate sodium 100 milliGRAM(s) Oral three times a day  erythromycin   Ointment 1 Application(s) Both EYES two times a day  famotidine    Tablet 20 milliGRAM(s) Oral daily  folic acid 1 milliGRAM(s) Oral daily  lubiprostone 8 MICROGram(s) Oral two times a day  rasagiline Tablet 1 milliGRAM(s) Oral daily  senna 2 Tablet(s) Oral at bedtime  sertraline 100 milliGRAM(s) Oral daily  simvastatin 10 milliGRAM(s) Oral at bedtime    MEDICATIONS  (PRN):  acetaminophen   Tablet .. 650 milliGRAM(s) Oral every 6 hours PRN Mild Pain (1 - 3)  ALPRAZolam 0.25 milliGRAM(s) Oral daily PRN anxiety  lidocaine 2% Gel 1 Application(s) Topical two times a day PRN pain  polyethylene glycol 3350 17 Gram(s) Oral daily PRN Constipation      Care Discussed with Consultants/Other Providers [x] YES  [ ] NO    HEALTH ISSUES - PROBLEM Dx:  Need for prophylactic measure: Need for prophylactic measure  Gout: Gout  Depression: SEEK IMMEDIATE CARE IF  You have thoughts about hurting yourself or others.  You lose touch with reality (have psychotic symptoms).  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  Have all medications adjusted for your renal function by your Health Care Provider.  Blood pressure control is important.  Take all medication as prescribed.    Metabolic acidosis: Metabolic acidosis  SIRS (systemic inflammatory response syndrome)  Urinary retention: Continue with núñez catheter  Follow-up with Urology
Patient is a 78y old  Male who presents with a chief complaint of Urinary retention (2019 13:39)      SUBJECTIVE / OVERNIGHT EVENTS:  feel well.  urine color improving.  much less hematuria.  denied pain.   He forgot why he is here.      Vital Signs Last 24 Hrs  T(C): 36.7 (2019 15:47), Max: 37.2 (15 Apr 2019 20:16)  T(F): 98.1 (2019 15:47), Max: 99 (15 Apr 2019 20:16)  HR: 82 (2019 15:47) (67 - 87)  BP: 144/75 (2019 15:47) (124/69 - 151/77)  BP(mean): --  RR: 19 (2019 15:47) (19 - 24)  SpO2: 100% (2019 15:47) (99% - 100%)  I&O's Summary    2019 07:01  -  2019 19:44  --------------------------------------------------------  IN: 0 mL / OUT: 1400 mL / NET: -1400 mL        PHYSICAL EXAM:  GENERAL: NAD, Comfortable, lying in bed, comfortable  HEAD:  Atraumatic, Normocephalic  EYES: EOMI, PERRLA, conjunctiva and sclera clear  NECK: Supple, No JVD  CHEST/LUNG: Clear to auscultation bilaterally; No wheeze  HEART: Regular rate and rhythm; No murmurs, rubs, or gallops  ABDOMEN: Soft, Nontender, Nondistended; Bowel sounds present  Neuro: Alert awake, no focal deficit, Parkinson features  : Arrieta in place, shawn color urine, neg CVAT   EXTREMITIES:  2+ Peripheral Pulses, No clubbing, cyanosis, or edema  SKIN: No rashes or lesions    LABS:                        8.9    18.76 )-----------( 438      ( 2019 09:31 )             29.1     04-16    140  |  108  |  21  ----------------------------<  107<H>  4.0   |  19<L>  |  1.22    Ca    8.8      2019 05:23  Mg     1.8     04-16    TPro  6.9  /  Alb  3.5  /  TBili  0.3  /  DBili  x   /  AST  17  /  ALT  <5<L>  /  AlkPhos  105  04-15    PT/INR - ( 15 Apr 2019 13:00 )   PT: 13.6 sec;   INR: 1.19 ratio         PTT - ( 15 Apr 2019 13:00 )  PTT:29.6 sec  CAPILLARY BLOOD GLUCOSE            Urinalysis Basic - ( 15 Apr 2019 14:09 )    Color: BROWN / Appearance: Slightly Turbid / S.016 / pH: x  Gluc: x / Ketone: Negative  / Bili: Negative / Urobili: Negative   Blood: x / Protein: 300 mg/dL / Nitrite: Negative   Leuk Esterase: Large / RBC: 8 /hpf / WBC 0 /HPF   Sq Epi: x / Non Sq Epi: 0 /hpf / Bacteria: Negative        RADIOLOGY & ADDITIONAL TESTS:    Imaging Personally Reviewed:  [x] YES  [ ] NO    Consultant(s) Notes Reviewed:  [x] YES  [ ] NO      MEDICATIONS  (STANDING):  allopurinol 100 milliGRAM(s) Oral daily  amantadine 100 milliGRAM(s) Oral two times a day  aspirin enteric coated 81 milliGRAM(s) Oral daily  erythromycin   Ointment 1 Application(s) Both EYES two times a day  famotidine    Tablet 20 milliGRAM(s) Oral daily  rasagiline Tablet 1 milliGRAM(s) Oral daily  sertraline 100 milliGRAM(s) Oral daily  simvastatin 10 milliGRAM(s) Oral at bedtime    MEDICATIONS  (PRN):  ALPRAZolam 0.25 milliGRAM(s) Oral daily PRN anxiety      Care Discussed with Consultants/Other Providers [x] YES  [ ] NO    HEALTH ISSUES - PROBLEM Dx:  Need for prophylactic measure: Need for prophylactic measure  Gout: Gout  Depression: Depression  Parkinson's disease: Parkinson&#x27;s disease  Diarrhea: Diarrhea  Thrombocytosis: Thrombocytosis  CKD stage 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
Patient is a 78y old  Male who presents with a chief complaint of Urinary retention (21 Apr 2019 10:46)      SUBJECTIVE / OVERNIGHT EVENTS:  awake alert  the wife at bedside  denied pain  no n/v/d.      Vital Signs Last 24 Hrs  T(C): 36.6 (22 Apr 2019 21:29), Max: 36.6 (22 Apr 2019 11:43)  T(F): 97.9 (22 Apr 2019 21:29), Max: 97.9 (22 Apr 2019 21:29)  HR: 78 (22 Apr 2019 21:29) (67 - 78)  BP: 131/73 (22 Apr 2019 21:29) (125/78 - 134/78)  BP(mean): --  RR: 18 (22 Apr 2019 21:29) (18 - 18)  SpO2: 97% (22 Apr 2019 21:29) (95% - 97%)  I&O's Summary    21 Apr 2019 07:01  -  22 Apr 2019 07:00  --------------------------------------------------------  IN: 240 mL / OUT: 800 mL / NET: -560 mL    22 Apr 2019 07:01  -  22 Apr 2019 22:15  --------------------------------------------------------  IN: 1245 mL / OUT: 0 mL / NET: 1245 mL        PHYSICAL EXAM:  GENERAL: NAD, Comfortable, lying in bed, comfortable  HEAD:  Atraumatic, Normocephalic  EYES: EOMI, PERRLA, conjunctiva and sclera clear  NECK: Supple, No JVD  CHEST/LUNG: Clear to auscultation bilaterally; No wheeze  HEART: Regular rate and rhythm; No murmurs, rubs, or gallops  ABDOMEN: Soft, Nontender, Nondistended; Bowel sounds present  Neuro: Alert awake, no focal deficit, Parkinson features  : Núñez in place, less hematuria, dark brown, neg CVAT, normal male genital without erythema, fissure/cut/bruise etc  EXTREMITIES:  2+ Peripheral Pulses, No clubbing, cyanosis, or edema  SKIN: No rashes or lesions    LABS:                        10.2   16.2  )-----------( 543      ( 21 Apr 2019 10:55 )             32.5     04-21    140  |  104  |  20  ----------------------------<  125<H>  3.7   |  21<L>  |  1.31<H>    Ca    9.6      21 Apr 2019 10:55  Mg     2.1     04-21    TPro  7.1  /  Alb  3.4  /  TBili  0.2  /  DBili  x   /  AST  31  /  ALT  <5<L>  /  AlkPhos  105  04-21      CAPILLARY BLOOD GLUCOSE                RADIOLOGY & ADDITIONAL TESTS:    Imaging Personally Reviewed:  [x] YES  [ ] NO    Consultant(s) Notes Reviewed:  [x] YES  [ ] NO      MEDICATIONS  (STANDING):  allopurinol 100 milliGRAM(s) Oral daily  amantadine 100 milliGRAM(s) Oral two times a day  carbidopa 36.25 mG/levodopa 145 mG ER 2 Capsule(s) Oral every 6 hours  docusate sodium 100 milliGRAM(s) Oral three times a day  erythromycin   Ointment 1 Application(s) Both EYES two times a day  famotidine    Tablet 20 milliGRAM(s) Oral daily  folic acid 1 milliGRAM(s) Oral daily  lubiprostone 8 MICROGram(s) Oral two times a day  rasagiline Tablet 1 milliGRAM(s) Oral daily  senna 2 Tablet(s) Oral at bedtime  sertraline 100 milliGRAM(s) Oral daily  simvastatin 10 milliGRAM(s) Oral at bedtime  sodium chloride 0.9%. 1000 milliLiter(s) (75 mL/Hr) IV Continuous <Continuous>    MEDICATIONS  (PRN):  acetaminophen   Tablet .. 650 milliGRAM(s) Oral every 6 hours PRN Mild Pain (1 - 3)  ALPRAZolam 0.25 milliGRAM(s) Oral daily PRN anxiety  lidocaine 2% Gel 1 Application(s) Topical two times a day PRN pain  polyethylene glycol 3350 17 Gram(s) Oral daily PRN Constipation      Care Discussed with Consultants/Other Providers [x] YES  [ ] NO    HEALTH ISSUES - PROBLEM Dx:  Need for prophylactic measure: Need for prophylactic measure  Gout: Gout  Depression: SEEK IMMEDIATE CARE IF  You have thoughts about hurting yourself or others.  You lose touch with reality (have psychotic symptoms).  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  Have all medications adjusted for your renal function by your Health Care Provider.  Blood pressure control is important.  Take all medication as prescribed.    Metabolic acidosis: Metabolic acidosis  SIRS (systemic inflammatory response syndrome)  Urinary retention: Continue with núñez catheter  Follow-up with Urology
Patient is a 78y old  Male who presents with a chief complaint of Urinary retention (22 Apr 2019 22:15)      SUBJECTIVE / OVERNIGHT EVENTS:  awake alert.  the wife at bedside.  no pain.   wife worry about his vision but he was able to read newspaper with glasses  states "my eye is fine"      Vital Signs Last 24 Hrs  T(C): 36.6 (23 Apr 2019 12:12), Max: 36.8 (23 Apr 2019 05:08)  T(F): 97.8 (23 Apr 2019 12:12), Max: 98.2 (23 Apr 2019 05:08)  HR: 73 (23 Apr 2019 14:48) (70 - 82)  BP: 105/62 (23 Apr 2019 14:48) (105/62 - 135/78)  BP(mean): --  RR: 18 (23 Apr 2019 14:48) (18 - 18)  SpO2: 94% (23 Apr 2019 14:48) (94% - 97%)  I&O's Summary    22 Apr 2019 07:01  -  23 Apr 2019 07:00  --------------------------------------------------------  IN: 2195 mL / OUT: 900 mL / NET: 1295 mL    23 Apr 2019 07:01  -  23 Apr 2019 17:04  --------------------------------------------------------  IN: 336 mL / OUT: 400 mL / NET: -64 mL        PHYSICAL EXAM:  GENERAL: NAD, Comfortable, sitting up  HEAD:  Atraumatic, Normocephalic  EYES: EOMI, PERRLA, conjunctiva and sclera clear  NECK: Supple, No JVD  CHEST/LUNG: Clear to auscultation bilaterally; No wheeze  HEART: Regular rate and rhythm; No murmurs, rubs, or gallops  ABDOMEN: Soft, Nontender, Nondistended; Bowel sounds present  Neuro: Alert awake, no focal deficit, Parkinson features  : Núñez in place, less hematuria, dark brown, neg CVAT, normal male genital without erythema, fissure/cut/bruise etc  EXTREMITIES:  2+ Peripheral Pulses, No clubbing, cyanosis, or edema  SKIN: No rashes or lesions      LABS:                        8.6    16.08 )-----------( 459      ( 23 Apr 2019 09:29 )             28.2     04-23    142  |  110<H>  |  14  ----------------------------<  94  3.7   |  21<L>  |  1.20    Ca    8.6      23 Apr 2019 07:11    TPro  6.3  /  Alb  2.9<L>  /  TBili  0.2  /  DBili  x   /  AST  25  /  ALT  <5<L>  /  AlkPhos  101  04-23      CAPILLARY BLOOD GLUCOSE                RADIOLOGY & ADDITIONAL TESTS:    Imaging Personally Reviewed:  [x] YES  [ ] NO    Consultant(s) Notes Reviewed:  [x] YES  [ ] NO      MEDICATIONS  (STANDING):  allopurinol 100 milliGRAM(s) Oral daily  amantadine 100 milliGRAM(s) Oral two times a day  carbidopa 36.25 mG/levodopa 145 mG ER 2 Capsule(s) Oral every 6 hours  docusate sodium 100 milliGRAM(s) Oral three times a day  erythromycin   Ointment 1 Application(s) Both EYES two times a day  famotidine    Tablet 20 milliGRAM(s) Oral daily  folic acid 1 milliGRAM(s) Oral daily  lubiprostone 8 MICROGram(s) Oral two times a day  rasagiline Tablet 1 milliGRAM(s) Oral daily  senna 2 Tablet(s) Oral at bedtime  sertraline 100 milliGRAM(s) Oral daily  simvastatin 10 milliGRAM(s) Oral at bedtime  sodium chloride 0.9%. 1000 milliLiter(s) (75 mL/Hr) IV Continuous <Continuous>    MEDICATIONS  (PRN):  acetaminophen   Tablet .. 650 milliGRAM(s) Oral every 6 hours PRN Mild Pain (1 - 3)  lidocaine 2% Gel 1 Application(s) Topical two times a day PRN pain  polyethylene glycol 3350 17 Gram(s) Oral daily PRN Constipation      Care Discussed with Consultants/Other Providers [x] YES  [ ] NO    HEALTH ISSUES - PROBLEM Dx:  Orthostatic hypotension: Orthostatic hypotension  Need for prophylactic measure: Need for prophylactic measure  Gout: Gout  Depression: SEEK IMMEDIATE CARE IF  You have thoughts about hurting yourself or others.  You lose touch with reality (have psychotic symptoms).  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  Have all medications adjusted for your renal function by your Health Care Provider.  Blood pressure control is important.  Take all medication as prescribed.    Metabolic acidosis: Metabolic acidosis  SIRS (systemic inflammatory response syndrome)  Urinary retention: Continue with núñez catheter  Follow-up with Urology
Pt 77 y/o male with hx of prostate cancer which was treated with radiation. For the past month has had hematuria as outpatient managed with núñez catheter by Dr. Nickerson. Called by primary team to evaluate núñez for dark maroon urine in tube. Núñez was still draining. Patient denies abdominal pain. Patient manually irrigated and found to not have any clots in the bladder. No signs of active bleeding. Minimal sediment. 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     increase po liquid intake to increase urine output   can be d/c with Núñez   f/u with Dr. nickerson in 2 weeks
Pts family requested urology to assess patient for leakage around núñez catheter.   Pt came to ED for chronic núñez not draining. Núñez was exchanged by ED staff and started on CBI.   Pts núñez was draining clear urine upon evaluation. CBI was clamped by myself and irrigated.   A small amount of clot was aspirated but urine was overall a clear to light shawn color.   No active bleeding noted that would require CBI , so CBI was disconnected as it is not needed at this time.   Recommend flushing the núñez as needed if núñez seems to not be draining.     Please call if any additional concerns.
Urology PA Follow up note    Called by primary team to evaluate núñez for hematuria with clots. Urine noted to be yellow/pink tinged with few clots in the tubing. Núñez was still draining. Patient denies abdominal pain. Patient manually irrigated and found to not have any clots in the bladder. No signs of active bleeding. Minimal sediment. Presumed blood previously from trauma during núñez placement which is now resolved. Urine clear entire time during irrigation. Núñez management per primary team. No indication for CBI. Irrigate núñez prn. Discussed with primary NP
Patient is a 78y old  Male who presents with a chief complaint of Urinary retention (18 Apr 2019 18:56)      SUBJECTIVE / OVERNIGHT EVENTS:  feeling better.  out of bed in chair.  núñez with urine less red. more brown now.  no cp, no sob, no n/v/d. no abdominal pain.  no headache, no dizziness.   vit B12 borderline, folic low.  supplemented.        Vital Signs Last 24 Hrs  T(C): 36.8 (19 Apr 2019 21:06), Max: 36.9 (19 Apr 2019 11:24)  T(F): 98.2 (19 Apr 2019 21:06), Max: 98.5 (19 Apr 2019 11:24)  HR: 69 (19 Apr 2019 21:06) (62 - 69)  BP: 157/90 (19 Apr 2019 21:06) (127/77 - 157/90)  BP(mean): --  RR: 19 (19 Apr 2019 21:06) (19 - 21)  SpO2: 96% (19 Apr 2019 21:06) (96% - 97%)  I&O's Summary    18 Apr 2019 07:01  -  19 Apr 2019 07:00  --------------------------------------------------------  IN: 960 mL / OUT: 900 mL / NET: 60 mL    19 Apr 2019 07:01  -  19 Apr 2019 21:35  --------------------------------------------------------  IN: 480 mL / OUT: 0 mL / NET: 480 mL      PHYSICAL EXAM:  GENERAL: NAD, Comfortable, lying in bed, comfortable  HEAD:  Atraumatic, Normocephalic  EYES: EOMI, PERRLA, conjunctiva and sclera clear  NECK: Supple, No JVD  CHEST/LUNG: Clear to auscultation bilaterally; No wheeze  HEART: Regular rate and rhythm; No murmurs, rubs, or gallops  ABDOMEN: Soft, Nontender, Nondistended; Bowel sounds present  Neuro: Alert awake, no focal deficit, Parkinson features  : Núñez in place, less hematuria, dark brown, neg CVAT, normal male genital without erythema, fissure/cut/bruise etc  EXTREMITIES:  2+ Peripheral Pulses, No clubbing, cyanosis, or edema  SKIN: No rashes or lesions    LABS:                        9.0    13.58 )-----------( 498      ( 19 Apr 2019 10:16 )             28.7     04-19    138  |  102  |  18  ----------------------------<  105<H>  4.0   |  22  |  1.11    Ca    8.9      19 Apr 2019 00:52  Phos  3.5     04-19  Mg     1.9     04-19        CAPILLARY BLOOD GLUCOSE                RADIOLOGY & ADDITIONAL TESTS:    Imaging Personally Reviewed:  [x] YES  [ ] NO    Consultant(s) Notes Reviewed:  [x] YES  [ ] NO      MEDICATIONS  (STANDING):  allopurinol 100 milliGRAM(s) Oral daily  amantadine 100 milliGRAM(s) Oral two times a day  carbidopa 36.25 mG/levodopa 145 mG ER 2 Capsule(s) Oral every 6 hours  cyanocobalamin Injectable 1000 MICROGram(s) IntraMuscular once  erythromycin   Ointment 1 Application(s) Both EYES two times a day  famotidine    Tablet 20 milliGRAM(s) Oral daily  folic acid 1 milliGRAM(s) Oral daily  lubiprostone 8 MICROGram(s) Oral two times a day  rasagiline Tablet 1 milliGRAM(s) Oral daily  sertraline 100 milliGRAM(s) Oral daily  simvastatin 10 milliGRAM(s) Oral at bedtime    MEDICATIONS  (PRN):  acetaminophen   Tablet .. 650 milliGRAM(s) Oral every 6 hours PRN Mild Pain (1 - 3)  ALPRAZolam 0.25 milliGRAM(s) Oral daily PRN anxiety  lidocaine 2% Gel 1 Application(s) Topical two times a day PRN pain      Care Discussed with Consultants/Other Providers [x] YES  [ ] NO    HEALTH ISSUES - PROBLEM Dx:  Need for prophylactic measure: Need for prophylactic measure  Gout: Gout  Depression: SEEK IMMEDIATE CARE IF  You have thoughts about hurting yourself or others.  You lose touch with reality (have psychotic symptoms).  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  Have all medications adjusted for your renal function by your Health Care Provider.  Blood pressure control is important.  Take all medication as prescribed.    Metabolic acidosis: Metabolic acidosis  SIRS (systemic inflammatory response syndrome)  Urinary retention: Continue with núñez catheter  Follow-up with Urology

## 2019-04-24 NOTE — DISCHARGE NOTE NURSING/CASE MANAGEMENT/SOCIAL WORK - NSDCDPATPORTLINK_GEN_ALL_CORE
You can access the [facility] Patient Portal, offered by [facility], by registering with the following website: [email or web address]

## 2019-04-24 NOTE — PROGRESS NOTE ADULT - REASON FOR ADMISSION
Urinary retention

## 2019-04-24 NOTE — PROGRESS NOTE ADULT - PROVIDER SPECIALTY LIST ADULT
Internal Medicine
Neurology
Urology
Internal Medicine

## 2019-04-28 ENCOUNTER — INPATIENT (INPATIENT)
Facility: HOSPITAL | Age: 79
LOS: 12 days | Discharge: HOME | DRG: 699 | End: 2019-05-11
Attending: INTERNAL MEDICINE | Admitting: INTERNAL MEDICINE
Payer: MEDICARE

## 2019-04-28 VITALS
WEIGHT: 220.02 LBS | OXYGEN SATURATION: 97 % | HEIGHT: 70 IN | TEMPERATURE: 98 F | DIASTOLIC BLOOD PRESSURE: 66 MMHG | HEART RATE: 84 BPM | RESPIRATION RATE: 16 BRPM | SYSTOLIC BLOOD PRESSURE: 112 MMHG

## 2019-04-28 DIAGNOSIS — R00.0 TACHYCARDIA, UNSPECIFIED: ICD-10-CM

## 2019-04-28 DIAGNOSIS — Z29.9 ENCOUNTER FOR PROPHYLACTIC MEASURES, UNSPECIFIED: ICD-10-CM

## 2019-04-28 DIAGNOSIS — N17.9 ACUTE KIDNEY FAILURE, UNSPECIFIED: ICD-10-CM

## 2019-04-28 DIAGNOSIS — G20 PARKINSON'S DISEASE: ICD-10-CM

## 2019-04-28 DIAGNOSIS — R31.9 HEMATURIA, UNSPECIFIED: ICD-10-CM

## 2019-04-28 DIAGNOSIS — I95.9 HYPOTENSION, UNSPECIFIED: ICD-10-CM

## 2019-04-28 DIAGNOSIS — N39.0 URINARY TRACT INFECTION, SITE NOT SPECIFIED: ICD-10-CM

## 2019-04-28 LAB
ALBUMIN SERPL ELPH-MCNC: 3.3 G/DL — SIGNIFICANT CHANGE UP (ref 3.3–5)
ALP SERPL-CCNC: 108 U/L — SIGNIFICANT CHANGE UP (ref 40–120)
ALT FLD-CCNC: <5 U/L — LOW (ref 10–45)
ANION GAP SERPL CALC-SCNC: 15 MMOL/L — SIGNIFICANT CHANGE UP (ref 5–17)
ANISOCYTOSIS BLD QL: SLIGHT — SIGNIFICANT CHANGE UP
APPEARANCE UR: ABNORMAL
APTT BLD: 29.9 SEC — SIGNIFICANT CHANGE UP (ref 27.5–36.3)
AST SERPL-CCNC: 25 U/L — SIGNIFICANT CHANGE UP (ref 10–40)
BACTERIA # UR AUTO: ABNORMAL
BASOPHILS # BLD AUTO: 0.1 K/UL — SIGNIFICANT CHANGE UP (ref 0–0.2)
BILIRUB SERPL-MCNC: 0.3 MG/DL — SIGNIFICANT CHANGE UP (ref 0.2–1.2)
BILIRUB UR-MCNC: ABNORMAL
BUN SERPL-MCNC: 21 MG/DL — SIGNIFICANT CHANGE UP (ref 7–23)
CALCIUM SERPL-MCNC: 9 MG/DL — SIGNIFICANT CHANGE UP (ref 8.4–10.5)
CHLORIDE SERPL-SCNC: 103 MMOL/L — SIGNIFICANT CHANGE UP (ref 96–108)
CO2 SERPL-SCNC: 21 MMOL/L — LOW (ref 22–31)
COLOR SPEC: SIGNIFICANT CHANGE UP
CREAT SERPL-MCNC: 2.36 MG/DL — HIGH (ref 0.5–1.3)
DIFF PNL FLD: ABNORMAL
EOSINOPHIL # BLD AUTO: 0.2 K/UL — SIGNIFICANT CHANGE UP (ref 0–0.5)
EOSINOPHIL NFR BLD AUTO: 1 % — SIGNIFICANT CHANGE UP (ref 0–6)
EPI CELLS # UR: 2 — SIGNIFICANT CHANGE UP
GLUCOSE SERPL-MCNC: 91 MG/DL — SIGNIFICANT CHANGE UP (ref 70–99)
GLUCOSE UR QL: ABNORMAL
HCT VFR BLD CALC: 27.3 % — LOW (ref 39–50)
HGB BLD-MCNC: 8.9 G/DL — LOW (ref 13–17)
HYPOCHROMIA BLD QL: SLIGHT — SIGNIFICANT CHANGE UP
INR BLD: 1.24 RATIO — HIGH (ref 0.88–1.16)
KETONES UR-MCNC: ABNORMAL
LACTATE BLDV-MCNC: 1.5 MMOL/L — SIGNIFICANT CHANGE UP (ref 0.7–2)
LEUKOCYTE ESTERASE UR-ACNC: ABNORMAL
LYMPHOCYTES # BLD AUTO: 1.7 K/UL — SIGNIFICANT CHANGE UP (ref 1–3.3)
LYMPHOCYTES # BLD AUTO: 12 % — LOW (ref 13–44)
MCHC RBC-ENTMCNC: 29.1 PG — SIGNIFICANT CHANGE UP (ref 27–34)
MCHC RBC-ENTMCNC: 32.6 GM/DL — SIGNIFICANT CHANGE UP (ref 32–36)
MCV RBC AUTO: 89.2 FL — SIGNIFICANT CHANGE UP (ref 80–100)
MONOCYTES # BLD AUTO: 0.9 K/UL — SIGNIFICANT CHANGE UP (ref 0–0.9)
MONOCYTES NFR BLD AUTO: 7 % — SIGNIFICANT CHANGE UP (ref 2–14)
MYELOCYTES NFR BLD: 1 % — HIGH (ref 0–0)
NEUTROPHILS # BLD AUTO: 17.1 K/UL — HIGH (ref 1.8–7.4)
NEUTROPHILS NFR BLD AUTO: 79 % — HIGH (ref 43–77)
NITRITE UR-MCNC: POSITIVE
PH UR: 9 — HIGH (ref 5–8)
PLAT MORPH BLD: NORMAL — SIGNIFICANT CHANGE UP
PLATELET # BLD AUTO: 529 K/UL — HIGH (ref 150–400)
POIKILOCYTOSIS BLD QL AUTO: SLIGHT — SIGNIFICANT CHANGE UP
POLYCHROMASIA BLD QL SMEAR: SLIGHT — SIGNIFICANT CHANGE UP
POTASSIUM SERPL-MCNC: 4.8 MMOL/L — SIGNIFICANT CHANGE UP (ref 3.5–5.3)
POTASSIUM SERPL-SCNC: 4.8 MMOL/L — SIGNIFICANT CHANGE UP (ref 3.5–5.3)
PROT SERPL-MCNC: 6.8 G/DL — SIGNIFICANT CHANGE UP (ref 6–8.3)
PROT UR-MCNC: ABNORMAL
PROTHROM AB SERPL-ACNC: 14.3 SEC — HIGH (ref 10–12.9)
RBC # BLD: 3.06 M/UL — LOW (ref 4.2–5.8)
RBC # FLD: 13.8 % — SIGNIFICANT CHANGE UP (ref 10.3–14.5)
RBC BLD AUTO: ABNORMAL
RBC CASTS # UR COMP ASSIST: >50 /HPF — HIGH (ref 0–4)
SODIUM SERPL-SCNC: 139 MMOL/L — SIGNIFICANT CHANGE UP (ref 135–145)
SP GR SPEC: 1.03 — HIGH (ref 1.01–1.02)
TROPONIN T, HIGH SENSITIVITY RESULT: 18 NG/L — SIGNIFICANT CHANGE UP (ref 0–51)
UROBILINOGEN FLD QL: ABNORMAL
WBC # BLD: 20.1 K/UL — HIGH (ref 3.8–10.5)
WBC # FLD AUTO: 20.1 K/UL — HIGH (ref 3.8–10.5)
WBC UR QL: >50 /HPF — HIGH (ref 0–5)

## 2019-04-28 PROCEDURE — 99223 1ST HOSP IP/OBS HIGH 75: CPT

## 2019-04-28 PROCEDURE — 99285 EMERGENCY DEPT VISIT HI MDM: CPT | Mod: GC,25

## 2019-04-28 PROCEDURE — 71045 X-RAY EXAM CHEST 1 VIEW: CPT | Mod: 26

## 2019-04-28 PROCEDURE — 93010 ELECTROCARDIOGRAM REPORT: CPT

## 2019-04-28 RX ORDER — SODIUM CHLORIDE 9 MG/ML
2000 INJECTION INTRAMUSCULAR; INTRAVENOUS; SUBCUTANEOUS ONCE
Qty: 0 | Refills: 0 | Status: COMPLETED | OUTPATIENT
Start: 2019-04-28 | End: 2019-04-28

## 2019-04-28 RX ORDER — FOLIC ACID 0.8 MG
1 TABLET ORAL DAILY
Qty: 0 | Refills: 0 | Status: DISCONTINUED | OUTPATIENT
Start: 2019-04-28 | End: 2019-05-11

## 2019-04-28 RX ORDER — AMANTADINE HCL 100 MG
100 CAPSULE ORAL EVERY 12 HOURS
Qty: 0 | Refills: 0 | Status: DISCONTINUED | OUTPATIENT
Start: 2019-04-28 | End: 2019-05-11

## 2019-04-28 RX ORDER — LIDOCAINE 4 G/100G
1 CREAM TOPICAL
Qty: 0 | Refills: 0 | Status: DISCONTINUED | OUTPATIENT
Start: 2019-04-28 | End: 2019-05-11

## 2019-04-28 RX ORDER — POLYETHYLENE GLYCOL 3350 17 G/17G
17 POWDER, FOR SOLUTION ORAL DAILY
Qty: 0 | Refills: 0 | Status: DISCONTINUED | OUTPATIENT
Start: 2019-04-28 | End: 2019-05-11

## 2019-04-28 RX ORDER — CARBIDOPA AND LEVODOPA 25; 100 MG/1; MG/1
2 TABLET ORAL
Qty: 0 | Refills: 0 | Status: DISCONTINUED | OUTPATIENT
Start: 2019-04-28 | End: 2019-05-04

## 2019-04-28 RX ORDER — ALLOPURINOL 300 MG
100 TABLET ORAL DAILY
Qty: 0 | Refills: 0 | Status: DISCONTINUED | OUTPATIENT
Start: 2019-04-28 | End: 2019-05-11

## 2019-04-28 RX ORDER — CEFTRIAXONE 500 MG/1
INJECTION, POWDER, FOR SOLUTION INTRAMUSCULAR; INTRAVENOUS
Qty: 0 | Refills: 0 | Status: COMPLETED | OUTPATIENT
Start: 2019-04-28 | End: 2019-05-04

## 2019-04-28 RX ORDER — LUBIPROSTONE 24 UG/1
8 CAPSULE, GELATIN COATED ORAL
Qty: 0 | Refills: 0 | Status: DISCONTINUED | OUTPATIENT
Start: 2019-04-28 | End: 2019-04-29

## 2019-04-28 RX ORDER — ACETAMINOPHEN 500 MG
650 TABLET ORAL EVERY 6 HOURS
Qty: 0 | Refills: 0 | Status: DISCONTINUED | OUTPATIENT
Start: 2019-04-28 | End: 2019-05-11

## 2019-04-28 RX ORDER — CEFTRIAXONE 500 MG/1
1 INJECTION, POWDER, FOR SOLUTION INTRAMUSCULAR; INTRAVENOUS EVERY 24 HOURS
Qty: 0 | Refills: 0 | Status: COMPLETED | OUTPATIENT
Start: 2019-04-29 | End: 2019-05-04

## 2019-04-28 RX ORDER — RASAGILINE 0.5 MG/1
1 TABLET ORAL DAILY
Qty: 0 | Refills: 0 | Status: DISCONTINUED | OUTPATIENT
Start: 2019-04-28 | End: 2019-05-11

## 2019-04-28 RX ORDER — CEFTRIAXONE 500 MG/1
1 INJECTION, POWDER, FOR SOLUTION INTRAMUSCULAR; INTRAVENOUS ONCE
Qty: 0 | Refills: 0 | Status: COMPLETED | OUTPATIENT
Start: 2019-04-28 | End: 2019-04-28

## 2019-04-28 RX ORDER — DOCUSATE SODIUM 100 MG
100 CAPSULE ORAL
Qty: 0 | Refills: 0 | Status: DISCONTINUED | OUTPATIENT
Start: 2019-04-28 | End: 2019-05-11

## 2019-04-28 RX ORDER — SERTRALINE 25 MG/1
100 TABLET, FILM COATED ORAL DAILY
Qty: 0 | Refills: 0 | Status: DISCONTINUED | OUTPATIENT
Start: 2019-04-28 | End: 2019-05-11

## 2019-04-28 RX ORDER — SODIUM CHLORIDE 9 MG/ML
1000 INJECTION INTRAMUSCULAR; INTRAVENOUS; SUBCUTANEOUS
Qty: 0 | Refills: 0 | Status: DISCONTINUED | OUTPATIENT
Start: 2019-04-28 | End: 2019-05-06

## 2019-04-28 RX ORDER — SIMVASTATIN 20 MG/1
10 TABLET, FILM COATED ORAL AT BEDTIME
Qty: 0 | Refills: 0 | Status: DISCONTINUED | OUTPATIENT
Start: 2019-04-28 | End: 2019-05-11

## 2019-04-28 RX ADMIN — CEFTRIAXONE 100 GRAM(S): 500 INJECTION, POWDER, FOR SOLUTION INTRAMUSCULAR; INTRAVENOUS at 23:08

## 2019-04-28 RX ADMIN — SODIUM CHLORIDE 1000 MILLILITER(S): 9 INJECTION INTRAMUSCULAR; INTRAVENOUS; SUBCUTANEOUS at 13:48

## 2019-04-28 RX ADMIN — Medication 650 MILLIGRAM(S): at 22:55

## 2019-04-28 RX ADMIN — SODIUM CHLORIDE 75 MILLILITER(S): 9 INJECTION INTRAMUSCULAR; INTRAVENOUS; SUBCUTANEOUS at 23:03

## 2019-04-28 RX ADMIN — CARBIDOPA AND LEVODOPA 2 CAPSULE(S): 25; 100 TABLET ORAL at 22:59

## 2019-04-28 NOTE — CONSULT NOTE ADULT - ASSESSMENT
77 y/o male PMHx Prostate CA s/p XRT 2010  admitted for hypotension and tachycardia. consult for hematuria and  OSMIN, Had similar episode in the past month. Now Cr 2.36. Irrigated bladder at bed side.    patient had 22 Fr 3 way in place(last change was 4/22/19). PVR 85cc.  30cc dark red colored urine drained. Irrigated to light red with 100cc of clot return. No CBI needed now. Procedure done then Núñez secured with stat lock. Pt tolerated well.    Plan  -keep núñez in place for now, no need for CBI  -monitor urine color  -trend Cr

## 2019-04-28 NOTE — H&P ADULT - PROBLEM SELECTOR PLAN 1
-pt with chronic indwelling núñez and his UA is chronically abnormal; however, he is unable to classify presence of symptoms, and UA appears worse than baseline with now nitrities and LE + compared to prior; given associated hypotension and tachycardia and uptrending leukocytosis, would favor empiric abx at this time.  -start ceftriaxone 1 gram qd for now; prior cx reviewed prior UCx + for kleb oxytoca sens to ctx  -ID eval in am  -f/u bcx, ucx  -pt now hemodynamically stable post IVF; suspect component of autonomic dysfunction related to parkinsons but must r/o infectious etio  -Urology eval as noted

## 2019-04-28 NOTE — H&P ADULT - PROBLEM SELECTOR PLAN 3
-ongoing, previously attributed to issues with radiation cystitis   -núñez in place, apparently changed in ER, no drainage into bag but dark red urine in tube  -urology eval called  -check bladder scan  -no vte pharm ppx, start scd  -hgb stable per baseline 8-9

## 2019-04-28 NOTE — H&P ADULT - NSHPREVIEWOFSYSTEMS_GEN_ALL_CORE
REVIEW OF SYSTEMS:  CONSTITUTIONAL: +weakness. No fevers. No chills. No weight loss. Good appetite.  EYES: No visual changes. No eye pain.  ENT: No hearing difficulty. No vertigo. No dysphagia. No Sinusitis/rhinorrhea.  NECK: No pain. No stiffness/rigidity.  CARDIAC: No chest pain. No palpitations.  RESPIRATORY: No cough. No SOB. No hemoptysis.  GASTROINTESTINAL: No abdominal pain. No nausea. No vomiting. No hematemesis. No diarrhea. No constipation. No melena. No hematochezia.  GENITOURINARY: ?dysuria. No frequency. No hesitancy. No hematuria.  NEUROLOGICAL: No numbness. No focal weakness. No incontinence. No headache.  BACK: No back pain.  EXTREMITIES: No lower extremity edema. Full ROM.  SKIN: No rashes. No itching. No other lesions.  PSYCHIATRIC: No depression. No anxiety. No SI/HI.  ALLERGIC: No lip swelling. No hives.  All other review of systems is negative unless indicated above.

## 2019-04-28 NOTE — ED PROVIDER NOTE - OBJECTIVE STATEMENT
78 M PMHx of HTN, HLD, chronic indwelling núñez after admission last month with multiple issues of núñez clogging and retention, Parkinson's disease admitted with repeat urinary retention/núñez clogging BIBEMS from rehab with hypotension/tachycardia. Patient is poor historian. Notes weakness and vague R shoulder pain this afternoon that is now resolved. Was unable to participate in excercises today 2/2 feeling tired/weak. Denies recent infectious signs/symptoms, cp, sob, abd pain, no other complaints.

## 2019-04-28 NOTE — H&P ADULT - PROBLEM SELECTOR PLAN 5
-c/w rytary, meds at bedside (rytary not on formulary)  -c/w sertraline, amantadine, and rasagiline  -fall precautions, aspiration precautions

## 2019-04-28 NOTE — ED PROVIDER NOTE - ATTENDING CONTRIBUTION TO CARE
77 y/o m with pmhx HTN, HLD, chronic indwelling núñez after admission last month with multiple issues of núñez clogging and retention, Parkinson's disease  today noted to have to low blood pressure and elevated heart rate today. son with him at the bedside, was visiting and noted he appeared tired and fatigued. patient denies any complaints.  patient does not recall further information. no chest pain or heart palp.   Gen. no acute distress    HEENT:  perrl eomi   Lungs:  b/l bs, crackles at bases. no retractions  CVS: S1S2   Abd;  soft, no guarding no distention. no tenderness.   núñez in place with blood tinged urine.  Ext: trace b/l lower ext edema  Neuro: aaox3,   MSK: strength 5/5 b/l upper and lower ext

## 2019-04-28 NOTE — H&P ADULT - ASSESSMENT
78 M PMH of HTN, HLD, prostate cancer s/p RT c/b hemorrhagic radiation cystitis, chronic indwelling núñez after admission last month with multiple issues of núñez clogging and retention, Parkinson's disease, recent admit for obstructive uropathy 2/2 núñez issues, now presents from rehab with hypotension and tachycardia, f/w hematuria and OSMIN.

## 2019-04-28 NOTE — ED ADULT NURSE NOTE - NSIMPLEMENTINTERV_GEN_ALL_ED
Implemented All Fall Risk Interventions:  [place] to call system. Call bell, personal items and telephone within reach. Instruct patient to call for assistance. Room bathroom lighting operational. Non-slip footwear when patient is off stretcher. Physically safe environment: no spills, clutter or unnecessary equipment. Stretcher in lowest position, wheels locked, appropriate side rails in place. Provide visual cue, wrist band, yellow gown, etc. Monitor gait and stability. Monitor for mental status changes and reorient to person, place, and time. Review medications for side effects contributing to fall risk. Reinforce activity limits and safety measures with patient and family.

## 2019-04-28 NOTE — H&P ADULT - NSHPSOCIALHISTORY_GEN_ALL_CORE
Social History:    Marital Status:  (x  )    (   ) Single    (   )    (  )   Occupation: retired   Lives with: (  ) alone  (  ) children   (  ) spouse   (  ) parents  (x  ) other    Substance Use (street drugs): ( x ) never used  (  ) other:  Tobacco Usage:  (   ) never smoked   (  x ) former smoker   (   ) current smoker  (     ) pack year  (        ) last cigarette date  Alcohol Usage: denies

## 2019-04-28 NOTE — H&P ADULT - NSHPLABSRESULTS_GEN_ALL_CORE
Labs: Chronic uptrending leukocytosis since end of march wbc 20.1 here, chronic stable anemia, chronic thrombocytosis, HST delta 26 ?18, cmp with new sushant scr 2.36 b/l 1.1. UA grossly + in setting of chronic indwelling núñez. CXR clear lungs.

## 2019-04-28 NOTE — H&P ADULT - PROBLEM SELECTOR PLAN 2
-reports from rehab suggest tachycardia to 150 and irregularity on pulse; EMS records show HR 90s and irregular, but no EKG confirming afib  -agree with tele monitoring; tele reviewed sinus 60-80 currently  -suspect some degree of autonomic instability contributing to abn vitals; c/w empiric treatment of UTI as noted  -fall precautions for hypotension

## 2019-04-28 NOTE — H&P ADULT - NSHPPHYSICALEXAM_GEN_ALL_CORE
PHYSICAL EXAM:  GENERAL: NAD, chronically ill appearing caucausian man  HEAD:  Atraumatic, Normocephalic  EYES: EOMI, PERRLA, conjunctiva and sclera clear  ENMT: No tonsillar erythema, exudates, or enlargement; Moist mucous membranes, Good dentition, No lesions  NECK: Supple, No JVD, Normal thyroid  CHEST/LUNG: Clear to percussion bilaterally; No rales, rhonchi, wheezing, or rubs no resp distress or accessory muscle usage  HEART: Regular rate and rhythm; No murmurs, rubs, or gallops, trace edema b/l LE.   ABDOMEN: Soft, Nontender, Nondistended; Bowel sounds present. +mild suprapubic tenderness. no rebound/guarding.   EXTREMITIES:  2+ Peripheral Pulses, No clubbing, cyanosis, rom intact  LYMPH: No lymphadenopathy noted, no lymphangitis  SKIN: No rashes or lesions, no petechiae  NERVOUS SYSTEM:  Alert & Oriented X2-3, Good concentration; Motor Strength 5/5 B/L upper and lower extremities

## 2019-04-28 NOTE — ED PROVIDER NOTE - CONSTITUTIONAL, MLM
normal... chornically ill appearing. awake, alert, oriented to person, place, time/situation and in no apparent distress.

## 2019-04-28 NOTE — H&P ADULT - HISTORY OF PRESENT ILLNESS
78 M PMH of HTN, HLD, prostate cancer s/p RT c/b hemorrhagic radiation cystitis, chronic indwelling núñez after admission last month with multiple issues of núñez clogging and retention, Parkinson's disease, recent admit for obstructive uropathy 2/2 núñez issues, now presents from rehab with hypotension and tachycardia. Call placed to Nursing supervisor at [facility] rehab ([phone]) who states that pt was noted to have pulse to 150 irregular concerning for afib? (no ekg done) and was noted to have hypotension to 80s/40s. Afebrile 97.5 at rehab. Generally not appearing well per family to brought to ER. Also less able to participate with PT 2/2 weakness.  Pt is suboptimal historian; he is able to state that he has ongoing issues with hematuria related to his núñez.  He denies cp/sob/f/c/n/v/d, unk dysuria, denies cough/rash. Pt is unsure quantity of urine that has been draining or when núñez was last changed. Denies abd pain, back pain, visual changes, headaches.     Of note pt did have RRT called for syncopal event that occurred during transfer from [facility] after receiving cervical MRI on prior admission.  Sx attributed to orthostatic hypotension, given IVF.     Vs: 98.3, 72, 74/47 -->135/74, 18, 97% RA. Labs: Chronic uptrending leukocytosis since end of march wbc 20.1 here, chronic stable anemia, chronic thrombocytosis, HST delta 26 à18, cmp with new sushant scr 2.36 b/l 1.1. UA grossly + in setting of chronic indwelling núñez. CXR clear lungs. Given 2L IVF in ER prior to medicine team involvement.

## 2019-04-28 NOTE — ED PROVIDER NOTE - PROGRESS NOTE DETAILS
Spoke with Natalie from [facility]. [phone] - states patient was having afib with rvr Ag pGy2: urine is dirty. Likely 2/2 chronic indwelling catheter. Patient is not complaining of abd pain. Will hold abx for now and wait on urine culture.

## 2019-04-28 NOTE — ED ADULT NURSE NOTE - CHIEF COMPLAINT QUOTE
Sent in for tachycardia from [facility] home. Pt c/o right shoulder pain

## 2019-04-28 NOTE — ED PROVIDER NOTE - CLINICAL SUMMARY MEDICAL DECISION MAKING FREE TEXT BOX
ATTG: : low blood pressure, concern for infection, check xray chest, check urine, ivf, check labs, and re reyes for dispo.

## 2019-04-28 NOTE — H&P ADULT - ATTENDING COMMENTS
Care to be assumed by [facility] Hospitalist at 8 am.  This patient was assigned to me by the hospitalist in charge; my involvement in this case has consisted of the initial history, physical, chart review, and management plan.  This patient was previously unknown to me.  Case endorsed to NP ______ at ____. Care to be assumed by [facility] Hospitalist at 8 am.  This patient was assigned to me by the hospitalist in charge; my involvement in this case has consisted of the initial history, physical, chart review, and management plan.  This patient was previously unknown to me.  Case endorsed to AMY Mays covering 4 millicents [id number] at 1030 pm.  She is aware of outlined plan above.

## 2019-04-28 NOTE — ED ADULT NURSE NOTE - OBJECTIVE STATEMENT
Patient present to Ed via amb with c/o weakness and rt shoulder pain. Patient with indwelling núñez cath discharged from here in March after  problems with núñez blockage with clotting sent to ed via amb for low bp and rapid heartrate. Patient has hx of Parkinson disease and prostate  cancer arrived with núñez leg bag in place with gross blood in bag. Patient c/o weakness and reports he was experiencing rt shoulder pain prior  to arrival. Patient denies chest pain or sob, hypotensive on arrival to ed exam room, no fever chills headache or dizziness, buttock slightly red.  Rt wrist 20g iv lock placed labs drawn and sent.

## 2019-04-28 NOTE — H&P ADULT - PROBLEM SELECTOR PLAN 4
-sushant suspected due to either hemodynamically mediated phenomenon related to hypotension/infection vs. obstructive uropathy  -núñez in place  -s/p IVF on admission  -trend bmp, avoid nephrotoxins, renally dose meds  -urology eval  -bladder scan  -consider additional imaging pending urology recs

## 2019-04-28 NOTE — CONSULT NOTE ADULT - SUBJECTIVE AND OBJECTIVE BOX
pt 77y/o M PMH of HTN, HLD, prostate cancer s/p RT c/b hemorrhagic radiation cystitis, chronic indwelling núñez after admission last month with multiple issues of núñez clogging and retention, Parkinson's disease, recent admit for obstructive uropathy 2/2 núñez issues, Now present from rehab and admitted for hypotension and tachycardia.  Consult urology team for evaluate hematuria and elevated Cr 2.36. Patient has Núñez in placed, No urine in the núñez bag, dark red urine in the tube.  Pt states some discomfort on the penis due to Núñez. Denies any flank pain or abdominal pain.      PAST MEDICAL & SURGICAL HISTORY:  Gout  Prostate cancer  HLD (hyperlipidemia)  HTN (hypertension)  Depression  Parkinson's disease  S/P TURP    FAMILY HISTORY:  No pertinent family history in first degree relatives    SOCIAL HISTORY:   Tobacco hx:  MEDICATIONS  (STANDING):  allopurinol 100 milliGRAM(s) Oral daily  amantadine 100 milliGRAM(s) Oral every 12 hours  carbidopa 36.25 mG/levodopa 145 mG ER 2 Capsule(s) Oral <User Schedule>  cefTRIAXone   IVPB      cefTRIAXone   IVPB 1 Gram(s) IV Intermittent once  docusate sodium 100 milliGRAM(s) Oral two times a day  folic acid 1 milliGRAM(s) Oral daily  lidocaine 2% Gel 1 Application(s) Topical two times a day  lubiprostone 8 MICROGram(s) Oral two times a day  rasagiline Tablet 1 milliGRAM(s) Oral daily  sertraline 100 milliGRAM(s) Oral daily  simvastatin 10 milliGRAM(s) Oral at bedtime  sodium chloride 0.9%. 1000 milliLiter(s) (75 mL/Hr) IV Continuous <Continuous>    MEDICATIONS  (PRN):  acetaminophen   Tablet .. 650 milliGRAM(s) Oral every 6 hours PRN Temp greater or equal to 38C (100.4F), Mild Pain (1 - 3), Moderate Pain (4 - 6), Severe Pain (7 - 10)  polyethylene glycol 3350 17 Gram(s) Oral daily PRN Constipation    Allergies    No Known Allergies    Intolerances        REVIEW OF SYSTEMS: Pertinent positives and negatives as stated in HPI, otherwise negative    Vital signs  T(C): 36.8 (19 @ 20:46), Max: 36.8 (19 @ 20:46)  HR: 72 (19 @ 20:46)  BP: 135/74 (19 @ 20:46)  SpO2: 97% (19 @ 20:46)  Wt(kg): --    Output      Physical Exam  Gen: NAD  Pulm: No respiratory distress, no subcostal retractions  CV: RRR, no JVD  Abd: Soft, NT, ND  : Circumcised, Núñez in placed, Draining dark red urine.  MSK: No edema present    LABS:         @ 13:36    WBC 20.1  / Hct 27.3  / SCr 2.36         139  |  103  |  21  ----------------------------<  91  4.8   |  21<L>  |  2.36<H>    Ca    9.0      2019 13:36    TPro  6.8  /  Alb  3.3  /  TBili  0.3  /  DBili  x   /  AST  25  /  ALT  <5<L>  /  AlkPhos  108      PT/INR - ( 2019 13:36 )   PT: 14.3 sec;   INR: 1.24 ratio         PTT - ( 2019 13:36 )  PTT:29.9 sec  Urinalysis Basic - ( 2019 13:36 )    Color: RED / Appearance: Turbid / S.034 / pH: x  Gluc: x / Ketone: Large  / Bili: Large / Urobili: 8 mg/dL   Blood: x / Protein: 300 mg/dL / Nitrite: Positive   Leuk Esterase: Large / RBC: >50 /hpf / WBC >50 /HPF   Sq Epi: x / Non Sq Epi: 2 / Bacteria: Few        Urine Cx:   Blood Cx:    Radiology:

## 2019-04-29 DIAGNOSIS — I95.9 HYPOTENSION, UNSPECIFIED: ICD-10-CM

## 2019-04-29 DIAGNOSIS — D72.829 ELEVATED WHITE BLOOD CELL COUNT, UNSPECIFIED: ICD-10-CM

## 2019-04-29 LAB
ALBUMIN SERPL ELPH-MCNC: 2.7 G/DL — LOW (ref 3.3–5)
ALP SERPL-CCNC: 88 U/L — SIGNIFICANT CHANGE UP (ref 40–120)
ALT FLD-CCNC: <5 U/L — LOW (ref 10–45)
ANION GAP SERPL CALC-SCNC: 12 MMOL/L — SIGNIFICANT CHANGE UP (ref 5–17)
AST SERPL-CCNC: 17 U/L — SIGNIFICANT CHANGE UP (ref 10–40)
BASOPHILS # BLD AUTO: 0.1 K/UL — SIGNIFICANT CHANGE UP (ref 0–0.2)
BASOPHILS NFR BLD AUTO: 0.7 % — SIGNIFICANT CHANGE UP (ref 0–2)
BILIRUB SERPL-MCNC: 0.2 MG/DL — SIGNIFICANT CHANGE UP (ref 0.2–1.2)
BLD GP AB SCN SERPL QL: NEGATIVE — SIGNIFICANT CHANGE UP
BUN SERPL-MCNC: 20 MG/DL — SIGNIFICANT CHANGE UP (ref 7–23)
CALCIUM SERPL-MCNC: 8.4 MG/DL — SIGNIFICANT CHANGE UP (ref 8.4–10.5)
CHLORIDE SERPL-SCNC: 108 MMOL/L — SIGNIFICANT CHANGE UP (ref 96–108)
CO2 SERPL-SCNC: 19 MMOL/L — LOW (ref 22–31)
CREAT SERPL-MCNC: 1.6 MG/DL — HIGH (ref 0.5–1.3)
EOSINOPHIL # BLD AUTO: 0.77 K/UL — HIGH (ref 0–0.5)
EOSINOPHIL NFR BLD AUTO: 5.3 % — SIGNIFICANT CHANGE UP (ref 0–6)
GLUCOSE SERPL-MCNC: 72 MG/DL — SIGNIFICANT CHANGE UP (ref 70–99)
HCT VFR BLD CALC: 23.1 % — LOW (ref 39–50)
HCT VFR BLD CALC: 24 % — LOW (ref 39–50)
HGB BLD-MCNC: 6.9 G/DL — CRITICAL LOW (ref 13–17)
HGB BLD-MCNC: 7.8 G/DL — LOW (ref 13–17)
IMM GRANULOCYTES NFR BLD AUTO: 1 % — SIGNIFICANT CHANGE UP (ref 0–1.5)
LYMPHOCYTES # BLD AUTO: 24.2 % — SIGNIFICANT CHANGE UP (ref 13–44)
LYMPHOCYTES # BLD AUTO: 3.51 K/UL — HIGH (ref 1–3.3)
MAGNESIUM SERPL-MCNC: 1.9 MG/DL — SIGNIFICANT CHANGE UP (ref 1.6–2.6)
MCHC RBC-ENTMCNC: 27.4 PG — SIGNIFICANT CHANGE UP (ref 27–34)
MCHC RBC-ENTMCNC: 29.8 PG — SIGNIFICANT CHANGE UP (ref 27–34)
MCHC RBC-ENTMCNC: 29.9 GM/DL — LOW (ref 32–36)
MCHC RBC-ENTMCNC: 32.8 GM/DL — SIGNIFICANT CHANGE UP (ref 32–36)
MCV RBC AUTO: 91.1 FL — SIGNIFICANT CHANGE UP (ref 80–100)
MCV RBC AUTO: 91.7 FL — SIGNIFICANT CHANGE UP (ref 80–100)
MONOCYTES # BLD AUTO: 1.01 K/UL — HIGH (ref 0–0.9)
MONOCYTES NFR BLD AUTO: 7 % — SIGNIFICANT CHANGE UP (ref 2–14)
NEUTROPHILS # BLD AUTO: 8.98 K/UL — HIGH (ref 1.8–7.4)
NEUTROPHILS NFR BLD AUTO: 61.8 % — SIGNIFICANT CHANGE UP (ref 43–77)
PHOSPHATE SERPL-MCNC: 3.6 MG/DL — SIGNIFICANT CHANGE UP (ref 2.5–4.5)
PLATELET # BLD AUTO: 443 K/UL — HIGH (ref 150–400)
PLATELET # BLD AUTO: 456 K/UL — HIGH (ref 150–400)
POTASSIUM SERPL-MCNC: 3.6 MMOL/L — SIGNIFICANT CHANGE UP (ref 3.5–5.3)
POTASSIUM SERPL-SCNC: 3.6 MMOL/L — SIGNIFICANT CHANGE UP (ref 3.5–5.3)
PROT SERPL-MCNC: 5.7 G/DL — LOW (ref 6–8.3)
RBC # BLD: 2.52 M/UL — LOW (ref 4.2–5.8)
RBC # BLD: 2.63 M/UL — LOW (ref 4.2–5.8)
RBC # FLD: 13.8 % — SIGNIFICANT CHANGE UP (ref 10.3–14.5)
RBC # FLD: 14.7 % — HIGH (ref 10.3–14.5)
RH IG SCN BLD-IMP: POSITIVE — SIGNIFICANT CHANGE UP
SODIUM SERPL-SCNC: 139 MMOL/L — SIGNIFICANT CHANGE UP (ref 135–145)
WBC # BLD: 14.1 K/UL — HIGH (ref 3.8–10.5)
WBC # BLD: 14.51 K/UL — HIGH (ref 3.8–10.5)
WBC # FLD AUTO: 14.1 K/UL — HIGH (ref 3.8–10.5)
WBC # FLD AUTO: 14.51 K/UL — HIGH (ref 3.8–10.5)

## 2019-04-29 RX ORDER — LUBIPROSTONE 24 UG/1
8 CAPSULE, GELATIN COATED ORAL
Qty: 0 | Refills: 0 | Status: DISCONTINUED | OUTPATIENT
Start: 2019-04-29 | End: 2019-05-11

## 2019-04-29 RX ADMIN — Medication 650 MILLIGRAM(S): at 11:40

## 2019-04-29 RX ADMIN — RASAGILINE 1 MILLIGRAM(S): 0.5 TABLET ORAL at 11:01

## 2019-04-29 RX ADMIN — Medication 100 MILLIGRAM(S): at 05:34

## 2019-04-29 RX ADMIN — CARBIDOPA AND LEVODOPA 2 CAPSULE(S): 25; 100 TABLET ORAL at 21:14

## 2019-04-29 RX ADMIN — CEFTRIAXONE 100 GRAM(S): 500 INJECTION, POWDER, FOR SOLUTION INTRAMUSCULAR; INTRAVENOUS at 22:32

## 2019-04-29 RX ADMIN — LUBIPROSTONE 8 MICROGRAM(S): 24 CAPSULE, GELATIN COATED ORAL at 05:34

## 2019-04-29 RX ADMIN — Medication 1 MILLIGRAM(S): at 11:01

## 2019-04-29 RX ADMIN — Medication 650 MILLIGRAM(S): at 11:00

## 2019-04-29 RX ADMIN — CARBIDOPA AND LEVODOPA 2 CAPSULE(S): 25; 100 TABLET ORAL at 01:50

## 2019-04-29 RX ADMIN — LUBIPROSTONE 8 MICROGRAM(S): 24 CAPSULE, GELATIN COATED ORAL at 17:59

## 2019-04-29 RX ADMIN — Medication 100 MILLIGRAM(S): at 11:01

## 2019-04-29 RX ADMIN — Medication 100 MILLIGRAM(S): at 17:55

## 2019-04-29 RX ADMIN — LIDOCAINE 1 APPLICATION(S): 4 CREAM TOPICAL at 17:55

## 2019-04-29 RX ADMIN — CARBIDOPA AND LEVODOPA 2 CAPSULE(S): 25; 100 TABLET ORAL at 10:00

## 2019-04-29 RX ADMIN — SIMVASTATIN 10 MILLIGRAM(S): 20 TABLET, FILM COATED ORAL at 21:13

## 2019-04-29 RX ADMIN — LIDOCAINE 1 APPLICATION(S): 4 CREAM TOPICAL at 05:36

## 2019-04-29 RX ADMIN — Medication 650 MILLIGRAM(S): at 00:30

## 2019-04-29 RX ADMIN — CARBIDOPA AND LEVODOPA 2 CAPSULE(S): 25; 100 TABLET ORAL at 17:57

## 2019-04-29 RX ADMIN — CARBIDOPA AND LEVODOPA 2 CAPSULE(S): 25; 100 TABLET ORAL at 05:36

## 2019-04-29 RX ADMIN — CARBIDOPA AND LEVODOPA 2 CAPSULE(S): 25; 100 TABLET ORAL at 14:42

## 2019-04-29 RX ADMIN — SERTRALINE 100 MILLIGRAM(S): 25 TABLET, FILM COATED ORAL at 11:01

## 2019-04-29 NOTE — PROGRESS NOTE ADULT - SUBJECTIVE AND OBJECTIVE BOX
No CP or SOB  No fevers or chills  (+)Malaise  No abd pain/N/V    Vital Signs Last 24 Hrs  T(C): 36.8 (2019 04:23), Max: 36.8 (2019 20:46)  T(F): 98.3 (2019 04:23), Max: 98.3 (2019 20:46)  HR: 62 (2019 04:23) (62 - 84)  BP: 114/70 (2019 04:23) (74/47 - 158/79)  BP(mean): --  RR: 18 (2019 04:23) (16 - 20)  SpO2: 96% (2019 04:23) (96% - 100%)	    GENERAL: NAD, chronically ill appearing caucausian man  HEAD:  Atraumatic, Normocephalic  EYES: EOMI, PERRLA, conjunctiva and sclera clear  ENMT: No tonsillar erythema, exudates, or enlargement; Moist mucous membranes, Good dentition, No lesions  NECK: Supple, No JVD, Normal thyroid  CHEST/LUNG: Clear to percussion bilaterally; No rales, rhonchi, wheezing, or rubs no resp distress or accessory muscle usage  HEART: Regular rate and rhythm; No murmurs, rubs, or gallops, trace edema b/l LE.   ABDOMEN: Soft, Nontender, Nondistended; Bowel sounds present. +mild suprapubic tenderness. no rebound/guarding.   EXTREMITIES:  2+ Peripheral Pulses, No clubbing, cyanosis, rom intact  LYMPH: No lymphadenopathy noted, no lymphangitis  SKIN: No rashes or lesions, no petechiae  NERVOUS SYSTEM:  Alert & Oriented X2-3, Good concentration; Motor Strength 5/5 B/L upper and lower extremities  PSYCH: flat affect, no delirium    LABS:                        7.8    14.1  )-----------( 443      ( 2019 09:49 )             24.0     -    139  |  108  |  20  ----------------------------<  72  3.6   |  19<L>  |  1.60<H>    Ca    8.4      2019 06:45  Phos  3.6     -  Mg     1.9         TPro  5.7<L>  /  Alb  2.7<L>  /  TBili  0.2  /  DBili  x   /  AST  17  /  ALT  <5<L>  /  AlkPhos  88      PT/INR - ( 2019 13:36 )   PT: 14.3 sec;   INR: 1.24 ratio         PTT - ( 2019 13:36 )  PTT:29.9 sec  CAPILLARY BLOOD GLUCOSE            Urinalysis Basic - ( 2019 13:36 )    Color: RED / Appearance: Turbid / S.034 / pH: x  Gluc: x / Ketone: Large  / Bili: Large / Urobili: 8 mg/dL   Blood: x / Protein: 300 mg/dL / Nitrite: Positive   Leuk Esterase: Large / RBC: >50 /hpf / WBC >50 /HPF   Sq Epi: x / Non Sq Epi: 2 / Bacteria: Few

## 2019-04-29 NOTE — PROGRESS NOTE ADULT - ASSESSMENT
77 y/o male PMHx Prostate CA s/p XRT 2010  admitted for hypotension and tachycardia. consult for hematuria and  OSMIN, Had similar episode in the past month. Now Cr 2.36. Irrigated bladder at bed side.      Plan  -Keep núñez in place for now, no need for CBI  -Monitor urine color  -Trend Cr, Hct  -Recommend neurology consult now that consideration of Shy-Drager has been raised, family has many questions  -Will follow

## 2019-04-29 NOTE — PROGRESS NOTE ADULT - SUBJECTIVE AND OBJECTIVE BOX
Patient seen and examined.  Catheter dark merlot color, flushed, 30cc of clot returned.  Per significant other at bedside, the consideration of Shy-Drager has been raised the hospitalist.  Had many questions, suggested that they speak with a neurologist for better eval and recommendations.    T(F): 97.6, Max: 98.3 (04-28-19 @ 20:46)  HR: 65  BP: 126/70  SpO2: 99%    OUT:    Indwelling Catheter - Urethral: 650 mL    Voided: 300 mL  Total OUT: 950 mL    Gen NAD   Arrieta dark merlot, flushed to clear pink    04-29 @ 09:49  WBC 14.1  / Hct 24.0  / SCr --       04-29 @ 07:42  WBC 14.51 / Hct 23.1  / SCr --

## 2019-04-29 NOTE — CONSULT NOTE ADULT - PROBLEM SELECTOR RECOMMENDATION 9
not new since last admission  ua with pyuria but would expect that since he has an indwelling catheter  last admission culture was klebsiella sensitive to ceftriaxone  can wait for blood and urine cx  núñez care per urology  no fever  can monitor wbc

## 2019-04-29 NOTE — CONSULT NOTE ADULT - ASSESSMENT
78m with parkinsons, indwelling núñez, radiation cystitis, admitted with   tachycardia, hypotension and sushant--- similar to last admission  leukocytosis-- wbc elevated throughout last admission

## 2019-04-30 LAB
ANION GAP SERPL CALC-SCNC: 11 MMOL/L — SIGNIFICANT CHANGE UP (ref 5–17)
BUN SERPL-MCNC: 18 MG/DL — SIGNIFICANT CHANGE UP (ref 7–23)
CALCIUM SERPL-MCNC: 8.3 MG/DL — LOW (ref 8.4–10.5)
CHLORIDE SERPL-SCNC: 106 MMOL/L — SIGNIFICANT CHANGE UP (ref 96–108)
CO2 SERPL-SCNC: 21 MMOL/L — LOW (ref 22–31)
CREAT SERPL-MCNC: 1.11 MG/DL — SIGNIFICANT CHANGE UP (ref 0.5–1.3)
CULTURE RESULTS: SIGNIFICANT CHANGE UP
GLUCOSE SERPL-MCNC: 91 MG/DL — SIGNIFICANT CHANGE UP (ref 70–99)
HCT VFR BLD CALC: 22.3 % — LOW (ref 39–50)
HGB BLD-MCNC: 6.9 G/DL — CRITICAL LOW (ref 13–17)
MCHC RBC-ENTMCNC: 28 PG — SIGNIFICANT CHANGE UP (ref 27–34)
MCHC RBC-ENTMCNC: 30.9 GM/DL — LOW (ref 32–36)
MCV RBC AUTO: 90.7 FL — SIGNIFICANT CHANGE UP (ref 80–100)
PLATELET # BLD AUTO: 494 K/UL — HIGH (ref 150–400)
POTASSIUM SERPL-MCNC: 3.9 MMOL/L — SIGNIFICANT CHANGE UP (ref 3.5–5.3)
POTASSIUM SERPL-SCNC: 3.9 MMOL/L — SIGNIFICANT CHANGE UP (ref 3.5–5.3)
RBC # BLD: 2.46 M/UL — LOW (ref 4.2–5.8)
RBC # FLD: 14.7 % — HIGH (ref 10.3–14.5)
SODIUM SERPL-SCNC: 138 MMOL/L — SIGNIFICANT CHANGE UP (ref 135–145)
SPECIMEN SOURCE: SIGNIFICANT CHANGE UP
WBC # BLD: 14.44 K/UL — HIGH (ref 3.8–10.5)
WBC # FLD AUTO: 14.44 K/UL — HIGH (ref 3.8–10.5)

## 2019-04-30 PROCEDURE — 99223 1ST HOSP IP/OBS HIGH 75: CPT | Mod: GC

## 2019-04-30 RX ORDER — ERYTHROMYCIN BASE 5 MG/GRAM
1 OINTMENT (GRAM) OPHTHALMIC (EYE)
Qty: 0 | Refills: 0 | Status: DISCONTINUED | OUTPATIENT
Start: 2019-04-30 | End: 2019-05-11

## 2019-04-30 RX ADMIN — CARBIDOPA AND LEVODOPA 2 CAPSULE(S): 25; 100 TABLET ORAL at 14:51

## 2019-04-30 RX ADMIN — RASAGILINE 1 MILLIGRAM(S): 0.5 TABLET ORAL at 13:19

## 2019-04-30 RX ADMIN — SIMVASTATIN 10 MILLIGRAM(S): 20 TABLET, FILM COATED ORAL at 21:14

## 2019-04-30 RX ADMIN — SERTRALINE 100 MILLIGRAM(S): 25 TABLET, FILM COATED ORAL at 13:19

## 2019-04-30 RX ADMIN — Medication 100 MILLIGRAM(S): at 05:26

## 2019-04-30 RX ADMIN — CARBIDOPA AND LEVODOPA 2 CAPSULE(S): 25; 100 TABLET ORAL at 05:31

## 2019-04-30 RX ADMIN — CARBIDOPA AND LEVODOPA 2 CAPSULE(S): 25; 100 TABLET ORAL at 02:37

## 2019-04-30 RX ADMIN — Medication 650 MILLIGRAM(S): at 13:19

## 2019-04-30 RX ADMIN — Medication 100 MILLIGRAM(S): at 18:20

## 2019-04-30 RX ADMIN — LUBIPROSTONE 8 MICROGRAM(S): 24 CAPSULE, GELATIN COATED ORAL at 18:20

## 2019-04-30 RX ADMIN — LIDOCAINE 1 APPLICATION(S): 4 CREAM TOPICAL at 05:26

## 2019-04-30 RX ADMIN — LIDOCAINE 1 APPLICATION(S): 4 CREAM TOPICAL at 17:42

## 2019-04-30 RX ADMIN — CARBIDOPA AND LEVODOPA 2 CAPSULE(S): 25; 100 TABLET ORAL at 23:25

## 2019-04-30 RX ADMIN — LUBIPROSTONE 8 MICROGRAM(S): 24 CAPSULE, GELATIN COATED ORAL at 05:26

## 2019-04-30 RX ADMIN — Medication 100 MILLIGRAM(S): at 13:19

## 2019-04-30 RX ADMIN — CEFTRIAXONE 100 GRAM(S): 500 INJECTION, POWDER, FOR SOLUTION INTRAMUSCULAR; INTRAVENOUS at 21:17

## 2019-04-30 RX ADMIN — Medication 1 APPLICATION(S): at 21:16

## 2019-04-30 RX ADMIN — Medication 650 MILLIGRAM(S): at 16:51

## 2019-04-30 RX ADMIN — CARBIDOPA AND LEVODOPA 2 CAPSULE(S): 25; 100 TABLET ORAL at 18:21

## 2019-04-30 RX ADMIN — Medication 1 MILLIGRAM(S): at 13:19

## 2019-04-30 RX ADMIN — CARBIDOPA AND LEVODOPA 2 CAPSULE(S): 25; 100 TABLET ORAL at 10:45

## 2019-04-30 NOTE — PROGRESS NOTE ADULT - SUBJECTIVE AND OBJECTIVE BOX
Patient seen and examined.  Arrieta irrigated, minimal clot evacuated, irrigated to pink.    T(F): 97.7, Max: 97.7 (04-30-19 @ 04:09)  HR: 67  BP: 113/57  SpO2: 95%    OUT:    Indwelling Catheter - Urethral: 950 mL  Total OUT: 950 mL    Gen NAD   Arrieta in place draining merlot colored urine    04-29 @ 09:49  WBC 14.1  / Hct 24.0  / SCr 1.11       .Blood  04-28 No growth to date.

## 2019-04-30 NOTE — PHYSICAL THERAPY INITIAL EVALUATION ADULT - PRECAUTIONS/LIMITATIONS, REHAB EVAL
chronic núñez, PD/cardiac precautions chronic núñez, PD/ Zabrina haq + high risk for falls/ orthostatic/fall precautions/cardiac precautions

## 2019-04-30 NOTE — PHYSICAL THERAPY INITIAL EVALUATION ADULT - PLANNED THERAPY INTERVENTIONS, PT EVAL
strengthening/transfer training/balance training/Goal: To negotiate 2 steps w/ bilateral handrail step over independently./gait training/bed mobility training

## 2019-04-30 NOTE — PHYSICAL THERAPY INITIAL EVALUATION ADULT - GENERAL OBSERVATIONS, REHAB EVAL
Rec'd in Rec'd in bed, R dorsum hand IB lock, + núñez + hematuria, NAD, agreeable to PT, s/p blood transfusion this am 2/2 to H/H 6.9/22.3

## 2019-04-30 NOTE — PHYSICAL THERAPY INITIAL EVALUATION ADULT - ADDITIONAL COMMENTS
lives w/ lives w/fiance in private home, 2 steps to enter w/ B/L rails, using rollator and has regular walkers at home, reading glasses, R handed

## 2019-04-30 NOTE — PHYSICAL THERAPY INITIAL EVALUATION ADULT - PERTINENT HX OF CURRENT PROBLEM, REHAB EVAL
78 M admitted on 4/28/19  PMH of HTN, HLD, prostate cancer s/p RT c/b hemorrhagic radiation cystitis, chronic indwelling núñez after admission last month with multiple issues of núñez clogging and retention, Parkinson's disease, recent admit for obstructive uropathy 2/2 núñez issues, now presents from rehab with hypotension and tachycardia, f/w hematuria and OSMIN.

## 2019-04-30 NOTE — PROGRESS NOTE ADULT - SUBJECTIVE AND OBJECTIVE BOX
No fevers or chills overnight  He wants to make sure he gets his lidocaine gel applied to meatus as needed  Urine cx contaminated  Blood cxs remain negative  Urine color is mildly-moderately blood tinged  Getting pRBC    Vital Signs Last 24 Hrs  T(C): 36.7 (2019 10:30), Max: 36.7 (2019 10:30)  T(F): 98 (2019 10:30), Max: 98 (2019 10:30)  HR: 81 (2019 10:30) (66 - 81)  BP: 122/73 (2019 10:30) (113/57 - 126/66)  BP(mean): --  RR: 18 (2019 10:30) (18 - 18)  SpO2: 95% (2019 10:30) (95% - 97%)    GENERAL: NAD, chronically ill appearing caucausian man  HEAD:  Atraumatic, Normocephalic  EYES: EOMI, PERRLA, conjunctiva and sclera clear  ENMT: No tonsillar erythema, exudates, or enlargement; Moist mucous membranes, Good dentition, No lesions  NECK: Supple, No JVD, Normal thyroid  CHEST/LUNG: Clear to percussion bilaterally; No rales, rhonchi, wheezing, or rubs no resp distress or accessory muscle usage  HEART: Regular rate and rhythm; No murmurs, rubs, or gallops, trace edema b/l LE.   ABDOMEN: Soft, Nontender, Nondistended; Bowel sounds present. +mild suprapubic tenderness. no rebound/guarding.   EXTREMITIES:  2+ Peripheral Pulses, No clubbing, cyanosis, rom intact  LYMPH: No lymphadenopathy noted, no lymphangitis  SKIN: No rashes or lesions, no petechiae  NERVOUS SYSTEM:  Alert & Oriented X2-3, Good concentration; Motor Strength 5/5 B/L upper and lower extremities  PSYCH: flat affect, no delirium    LABS:                        6.9    14.44 )-----------( 494      ( 2019 07:50 )             22.3     -    138  |  106  |  18  ----------------------------<  91  3.9   |  21<L>  |  1.11    Ca    8.3<L>      2019 04:58  Phos  3.6       Mg     1.9         TPro  5.7<L>  /  Alb  2.7<L>  /  TBili  0.2  /  DBili  x   /  AST  17  /  ALT  <5<L>  /  AlkPhos  88      PT/INR - ( 2019 13:36 )   PT: 14.3 sec;   INR: 1.24 ratio         PTT - ( 2019 13:36 )  PTT:29.9 sec  CAPILLARY BLOOD GLUCOSE            Urinalysis Basic - ( 2019 13:36 )    Color: RED / Appearance: Turbid / S.034 / pH: x  Gluc: x / Ketone: Large  / Bili: Large / Urobili: 8 mg/dL   Blood: x / Protein: 300 mg/dL / Nitrite: Positive   Leuk Esterase: Large / RBC: >50 /hpf / WBC >50 /HPF   Sq Epi: x / Non Sq Epi: 2 / Bacteria: Few

## 2019-04-30 NOTE — CONSULT NOTE ADULT - SUBJECTIVE AND OBJECTIVE BOX
Neurology Consult Note    Name  TANK VARGAS    HPI:  78 M PMH of HTN, HLD, prostate cancer s/p RT c/b hemorrhagic radiation cystitis, chronic indwelling núñez after admission last month with multiple issues of núñez clogging and retention, Parkinson's disease, recent admit for obstructive uropathy 2/2 núñez issues, now presents from rehab with hypotension and tachycardia. Call placed to Nursing supervisor at [facility] ([phone]) who states that pt was noted to have pulse to 150 irregular concerning for afib? (no ekg done) and was noted to have hypotension to 80s/40s. Afebrile 97.5 at rehab. Generally not appearing well per family to brought to ER. Also less able to participate with PT 2/2 weakness.  Pt is suboptimal historian; he is able to state that he has ongoing issues with hematuria related to his núñez.  He denies cp/sob/f/c/n/v/d, unk dysuria, denies cough/rash. Pt is unsure quantity of urine that has been draining or when núñez was last changed. Denies abd pain, back pain, visual changes, headaches.     Of note pt did have RRT called for syncopal event that occurred during transfer from [facility] after receiving cervical MRI on prior admission.  Sx attributed to orthostatic hypotension, given IVF.     Vs: 98.3, 72, 74/47 -->135/74, 18, 97% RA. Labs: Chronic uptrending leukocytosis since end of march wbc 20.1 here, chronic stable anemia, chronic thrombocytosis, HST delta 26 à18, cmp with new sushant scr 2.36 b/l 1.1. UA grossly + in setting of chronic indwelling núñez. CXR clear lungs. Given 2L IVF in ER prior to medicine team involvement. (28 Apr 2019 21:54)    Neurology consulted for family wanting to discuss patient's Parkinson's disease.    Subjective:    Review of Systems:  Constitutional:        Eyes, Ears, Mouth, Throat:   Respiratory:                            Cardiovascular:   Gastrointestinal:                                     Genitourinary:   Musculoskeletal:                                    Dermatologic:   Neurological: as per above                                                                 Psychiatric:   Endocrine:              Hematologic/Lymphatic:     MEDICATIONS  (STANDING):  allopurinol 100 milliGRAM(s) Oral daily  amantadine 100 milliGRAM(s) Oral every 12 hours  carbidopa 36.25 mG/levodopa 145 mG ER 2 Capsule(s) Oral <User Schedule>  cefTRIAXone   IVPB 1 Gram(s) IV Intermittent every 24 hours  cefTRIAXone   IVPB      docusate sodium 100 milliGRAM(s) Oral two times a day  folic acid 1 milliGRAM(s) Oral daily  lidocaine 2% Gel 1 Application(s) Topical two times a day  lubiprostone 8 MICROGram(s) Oral two times a day  rasagiline Tablet 1 milliGRAM(s) Oral daily  sertraline 100 milliGRAM(s) Oral daily  simvastatin 10 milliGRAM(s) Oral at bedtime  sodium chloride 0.9%. 1000 milliLiter(s) (75 mL/Hr) IV Continuous <Continuous>    MEDICATIONS  (PRN):  acetaminophen   Tablet .. 650 milliGRAM(s) Oral every 6 hours PRN Temp greater or equal to 38C (100.4F), Mild Pain (1 - 3), Moderate Pain (4 - 6), Severe Pain (7 - 10)  polyethylene glycol 3350 17 Gram(s) Oral daily PRN Constipation      Allergies    No Known Allergies    Intolerances      PAST MEDICAL & SURGICAL HISTORY:  Gout  Prostate cancer  HLD (hyperlipidemia)  HTN (hypertension)  Depression  Parkinson's disease  S/P TURP    FH:  SH:    Objective:   Vital Signs Last 24 Hrs  T(C): 36.4 (29 Apr 2019 21:11), Max: 36.8 (29 Apr 2019 04:23)  T(F): 97.6 (29 Apr 2019 21:11), Max: 98.3 (29 Apr 2019 04:23)  HR: 66 (29 Apr 2019 21:11) (62 - 66)  BP: 126/66 (29 Apr 2019 21:11) (114/70 - 126/70)  BP(mean): --  RR: 18 (29 Apr 2019 21:11) (18 - 18)  SpO2: 97% (29 Apr 2019 21:11) (96% - 99%)    General Exam:   General appearance: No acute distress                   Neurological Exam:  Neurologic:  - Mental Status:  Alert, awake, oriented to person, place, president, not to time; Speech moderately dysarthric (missing dentures) is fluent with intact naming, repetition, and comprehension; crosses midline, no extinction or neglect noted; Some memory issues, unable to tell exactly why in hospital, but knows general course of events over past 2 months.  - Cranial Nerves II-XII:  VFF, No nystagmus or APD noted, EOMI, PERRLA, V1-V3 intact, no facial asymmetry, t/p midline, SCM/trap intact.  - Motor:  Strength is 5/5 throughout.  There is no pronator drift.  Normal muscle bulk and tone throughout. There is intermittent low amplitude tremor L>R UE but no cogwheeling rigidity, tone appears normal in UE; there is increased rigidity in both LE L>R as well as occasional tremor in LLE.  - Reflexes:  trace and symmetric at the biceps, triceps, brachioradialis, knees, and ankles.  Plantars extensor b/l  - Sensory:  Intact to light touch. Impaired vibration in LE.  - Coordination:  Finger-nose-finger without dysmetria.  Rapid alternating hand movements intact.  - Gait:   Was able to stand up with assistance, but did not walk said feels unsteady    Other:                        7.8    14.1  )-----------( 443      ( 29 Apr 2019 09:49 )             24.0     04-29    139  |  108  |  20  ----------------------------<  72  3.6   |  19<L>  |  1.60<H>    Ca    8.4      29 Apr 2019 06:45  Phos  3.6     04-29  Mg     1.9     04-29    TPro  5.7<L>  /  Alb  2.7<L>  /  TBili  0.2  /  DBili  x   /  AST  17  /  ALT  <5<L>  /  AlkPhos  88  04-29    LIVER FUNCTIONS - ( 29 Apr 2019 06:45 )  Alb: 2.7 g/dL / Pro: 5.7 g/dL / ALK PHOS: 88 U/L / ALT: <5 U/L / AST: 17 U/L / GGT: x           PT/INR - ( 28 Apr 2019 13:36 )   PT: 14.3 sec;   INR: 1.24 ratio         PTT - ( 28 Apr 2019 13:36 )  PTT:29.9 sec    Radiology    EKG:  tele:  TTE:  EEG:

## 2019-04-30 NOTE — PROGRESS NOTE ADULT - ASSESSMENT
78m with parkinsons, indwelling núñez, radiation cystitis, admitted with   tachycardia, hypotension and sushant--- similar to last admission  leukocytosis-- wbc elevated throughout last admission in [place]

## 2019-04-30 NOTE — PROGRESS NOTE ADULT - ASSESSMENT
79 y/o male PMHx Prostate CA s/p XRT 2010  admitted for hypotension and tachycardia. consult for hematuria and  OSMIN, Had similar episode in the past month. Now Cr 2.36. Irrigated bladder at bed side.      Plan  -Keep núñez in place for now, no need for CBI  -Nursing to irrigate Núñez PRN  -Continue antibiotics  -Monitor urine color  -Trend Cr, Hct 77 y/o male PMHx Prostate CA s/p XRT 2010  admitted for hypotension and tachycardia. consult for hematuria and  OSMIN, Had similar episode in the past month. Now Cr 2.36. Irrigated bladder at bed side.      Plan  -Keep núñez in place for now, no need for CBI  -Nursing to irrigate Núñez PRN  -Continue antibiotics, hematuria likely to improve with treatment of UTI  -Monitor urine color  -Transfuse PRN  -Trend Cr, Hct

## 2019-04-30 NOTE — CONSULT NOTE ADULT - ASSESSMENT
78M pmhx of Parkinson's, HTN, HLD, chronic indwelling Núñez after admissions last month with multiple issues of núñez clogging and retention, admitted with urinary retention and clogged núñez; now with hematuria.  Neurological exam shows increased rigidity in his LE L>R as well as impaired vibration in LEs with babinski b/l.  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  Previous recommendations were to followup with Dr. Damien Crow, movement disorder specialist    Plan:  - check orthostatics as parkinson can cause dysautonomia  - PT/OT  - F/u outpt with neurologist Dr. Damien Crow 78M pmhx of Parkinson's, HTN, HLD, chronic indwelling Núñez after admissions last month with multiple issues of núñez clogging and retention, admitted with urinary retention and clogged núñez; now with hematuria.  Neurological exam shows increased rigidity in his LE L>R as well as impaired vibration in LEs with babinski b/l.  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  Previous recommendations were to followup with Dr. Damien Crow, movement disorder specialist    Plan:  - for orthostatic hypotention; discontinue exacerbating medications such as diuretics, antihypertensives; lifestyle modifications such as rising slowly, in stages, from supine to seated to standing; may try compression stockings and abdominal binders which minimize peripheral blood pooling; Increased salt and water intake, daily ingestion of 1.5 to 3 L per day of water and a target dose of 6 to 10 g/day of sodium  - PT/OT  - F/u outpt with neurologist Dr. Damien Crow

## 2019-04-30 NOTE — PROGRESS NOTE ADULT - SUBJECTIVE AND OBJECTIVE BOX
[facility], Division of Infectious Diseases  GARTH Jasmine A. Lee  [phone]    Name: TANK VARGAS  Age: 78y  Gender: Male  MRN: [id number]    Interval History--  Notes reviewed  pt till with pain in the groin, scrotum    Past Medical History--  Gout  Prostate cancer  HLD (hyperlipidemia)  HTN (hypertension)  Depression  Parkinson's disease  S/P TURP      For details regarding the patient's social history, family history, and other miscellaneous elements, please refer the initial infectious diseases consultation and/or the admitting history and physical examination for this admission.    Allergies    No Known Allergies    Intolerances        Medications--  Antibiotics:  cefTRIAXone   IVPB 1 Gram(s) IV Intermittent every 24 hours  cefTRIAXone   IVPB        Immunologic:    Other:  acetaminophen   Tablet .. PRN  allopurinol  amantadine  carbidopa 36.25 mG/levodopa 145 mG ER  docusate sodium  erythromycin   Ointment  folic acid  lidocaine 2% Gel  lubiprostone  polyethylene glycol 3350 PRN  rasagiline Tablet  sertraline  simvastatin  sodium chloride 0.9%.      Review of Systems--  A 10-point review of systems was obtained.     Pertinent positives and negatives--  Constitutional: No fevers. No Chills. No Rigors.   Cardiovascular: No chest pain. No palpitations.  Respiratory: No shortness of breath. No cough.  Gastrointestinal: No nausea or vomiting. No diarrhea or constipation.   Psychiatric: no anxiety    Review of systems otherwise negative except as previously noted.    Physical Examination--  Vital Signs: T(F): 98 (04-30-19 @ 10:30), Max: 98 (04-30-19 @ 10:30)  HR: 75 (04-30-19 @ 15:06)  BP: 119/74 (04-30-19 @ 15:06)  RR: 18 (04-30-19 @ 15:06)  SpO2: 97% (04-30-19 @ 15:06)  Wt(kg): --  General: Nontoxic-appearing Male in no acute distress.  HEENT: AT/NC.  Anicteric. Conjunctiva pink and moist. Oropharynx clear. Dentition fair.  Neck: Not rigid. No sense of mass.  Nodes: None palpable.  Lungs: Clear bilaterally without rales, wheezing or rhonchi  Heart: Regular rate and rhythm. No Murmur. No rub. No gallop. No palpable thrill.  Abdomen: Bowel sounds present and normoactive. Soft. Nondistended  Back:  No costovertebral angle tenderness.   Extremities: No cyanosis or clubbing. + edema.   Gu núñez  Skin: Warm. Dry. Good turgor. No rash. No vasculitic stigmata.  Psychiatric: Appropriate affect and mood for situation.         Laboratory Studies--  CBC                        6.9    14.44 )-----------( 494      ( 30 Apr 2019 07:50 )             22.3       Chemistries  04-30    138  |  106  |  18  ----------------------------<  91  3.9   |  21<L>  |  1.11    Ca    8.3<L>      30 Apr 2019 04:58  Phos  3.6     04-29  Mg     1.9     04-29    TPro  5.7<L>  /  Alb  2.7<L>  /  TBili  0.2  /  DBili  x   /  AST  17  /  ALT  <5<L>  /  AlkPhos  88  04-29      Culture Data    Culture - Urine (collected 29 Apr 2019 05:55)  Source: .Urine  Final Report (30 Apr 2019 12:27):    Culture grew 3 or more types of organisms which indicate    collection contamination; consider recollection only if clinically    indicated.    Culture - Blood (collected 28 Apr 2019 19:39)  Source: .Blood  Preliminary Report (29 Apr 2019 20:01):    No growth to date.    Culture - Blood (collected 28 Apr 2019 19:39)  Source: .Blood  Preliminary Report (29 Apr 2019 20:01):    No growth to date.    < from: Xray Chest 1 View- PORTABLE-Routine (04.28.19 @ 15:39) >    EXAM:  XR CHEST PORTABLE ROUTINE 1V                            PROCEDURE DATE:  04/28/2019            INTERPRETATION:    DATE OF STUDY: 4/28/2019    PRIOR: 4/15/19 exam.     CLINICAL INDICATION: 78-year-old man - shortness of breath.    TECHNIQUE: portable chest.    FINDINGS:   The cardiomediastinal silhouette is within normal limits.  The lungs are clear. No pleural effusion or pneumothorax.  No acute bony finding.     IMPRESSION:   Clear lungs.    < end of copied text >

## 2019-05-01 LAB
ANION GAP SERPL CALC-SCNC: 11 MMOL/L — SIGNIFICANT CHANGE UP (ref 5–17)
BUN SERPL-MCNC: 15 MG/DL — SIGNIFICANT CHANGE UP (ref 7–23)
CALCIUM SERPL-MCNC: 8.7 MG/DL — SIGNIFICANT CHANGE UP (ref 8.4–10.5)
CHLORIDE SERPL-SCNC: 107 MMOL/L — SIGNIFICANT CHANGE UP (ref 96–108)
CO2 SERPL-SCNC: 23 MMOL/L — SIGNIFICANT CHANGE UP (ref 22–31)
CREAT SERPL-MCNC: 1.23 MG/DL — SIGNIFICANT CHANGE UP (ref 0.5–1.3)
GLUCOSE SERPL-MCNC: 83 MG/DL — SIGNIFICANT CHANGE UP (ref 70–99)
HCT VFR BLD CALC: 26.2 % — LOW (ref 39–50)
HGB BLD-MCNC: 8 G/DL — LOW (ref 13–17)
MAGNESIUM SERPL-MCNC: 1.8 MG/DL — SIGNIFICANT CHANGE UP (ref 1.6–2.6)
MCHC RBC-ENTMCNC: 27.9 PG — SIGNIFICANT CHANGE UP (ref 27–34)
MCHC RBC-ENTMCNC: 30.5 GM/DL — LOW (ref 32–36)
MCV RBC AUTO: 91.3 FL — SIGNIFICANT CHANGE UP (ref 80–100)
PHOSPHATE SERPL-MCNC: 3.5 MG/DL — SIGNIFICANT CHANGE UP (ref 2.5–4.5)
PLATELET # BLD AUTO: 496 K/UL — HIGH (ref 150–400)
POTASSIUM SERPL-MCNC: 4.3 MMOL/L — SIGNIFICANT CHANGE UP (ref 3.5–5.3)
POTASSIUM SERPL-SCNC: 4.3 MMOL/L — SIGNIFICANT CHANGE UP (ref 3.5–5.3)
RBC # BLD: 2.87 M/UL — LOW (ref 4.2–5.8)
RBC # FLD: 14.6 % — HIGH (ref 10.3–14.5)
SODIUM SERPL-SCNC: 141 MMOL/L — SIGNIFICANT CHANGE UP (ref 135–145)
WBC # BLD: 14.89 K/UL — HIGH (ref 3.8–10.5)
WBC # FLD AUTO: 14.89 K/UL — HIGH (ref 3.8–10.5)

## 2019-05-01 PROCEDURE — 99231 SBSQ HOSP IP/OBS SF/LOW 25: CPT

## 2019-05-01 RX ORDER — ACETAMINOPHEN 500 MG
650 TABLET ORAL ONCE
Qty: 0 | Refills: 0 | Status: COMPLETED | OUTPATIENT
Start: 2019-05-01 | End: 2019-05-01

## 2019-05-01 RX ADMIN — Medication 1 APPLICATION(S): at 17:46

## 2019-05-01 RX ADMIN — CEFTRIAXONE 100 GRAM(S): 500 INJECTION, POWDER, FOR SOLUTION INTRAMUSCULAR; INTRAVENOUS at 22:10

## 2019-05-01 RX ADMIN — LUBIPROSTONE 8 MICROGRAM(S): 24 CAPSULE, GELATIN COATED ORAL at 17:46

## 2019-05-01 RX ADMIN — Medication 100 MILLIGRAM(S): at 17:46

## 2019-05-01 RX ADMIN — SERTRALINE 100 MILLIGRAM(S): 25 TABLET, FILM COATED ORAL at 11:39

## 2019-05-01 RX ADMIN — Medication 260 MILLIGRAM(S): at 06:45

## 2019-05-01 RX ADMIN — SIMVASTATIN 10 MILLIGRAM(S): 20 TABLET, FILM COATED ORAL at 22:10

## 2019-05-01 RX ADMIN — Medication 650 MILLIGRAM(S): at 22:11

## 2019-05-01 RX ADMIN — CARBIDOPA AND LEVODOPA 2 CAPSULE(S): 25; 100 TABLET ORAL at 23:28

## 2019-05-01 RX ADMIN — CARBIDOPA AND LEVODOPA 2 CAPSULE(S): 25; 100 TABLET ORAL at 01:56

## 2019-05-01 RX ADMIN — Medication 650 MILLIGRAM(S): at 11:39

## 2019-05-01 RX ADMIN — Medication 100 MILLIGRAM(S): at 11:39

## 2019-05-01 RX ADMIN — LIDOCAINE 1 APPLICATION(S): 4 CREAM TOPICAL at 17:46

## 2019-05-01 RX ADMIN — Medication 1 APPLICATION(S): at 05:35

## 2019-05-01 RX ADMIN — POLYETHYLENE GLYCOL 3350 17 GRAM(S): 17 POWDER, FOR SOLUTION ORAL at 17:46

## 2019-05-01 RX ADMIN — Medication 0.5 MILLIGRAM(S): at 23:52

## 2019-05-01 RX ADMIN — Medication 650 MILLIGRAM(S): at 23:28

## 2019-05-01 RX ADMIN — CARBIDOPA AND LEVODOPA 2 CAPSULE(S): 25; 100 TABLET ORAL at 17:47

## 2019-05-01 RX ADMIN — Medication 650 MILLIGRAM(S): at 07:32

## 2019-05-01 RX ADMIN — Medication 650 MILLIGRAM(S): at 01:38

## 2019-05-01 RX ADMIN — RASAGILINE 1 MILLIGRAM(S): 0.5 TABLET ORAL at 11:39

## 2019-05-01 RX ADMIN — Medication 100 MILLIGRAM(S): at 05:34

## 2019-05-01 RX ADMIN — Medication 100 MILLIGRAM(S): at 05:40

## 2019-05-01 RX ADMIN — CARBIDOPA AND LEVODOPA 2 CAPSULE(S): 25; 100 TABLET ORAL at 06:14

## 2019-05-01 RX ADMIN — LUBIPROSTONE 8 MICROGRAM(S): 24 CAPSULE, GELATIN COATED ORAL at 05:34

## 2019-05-01 RX ADMIN — CARBIDOPA AND LEVODOPA 2 CAPSULE(S): 25; 100 TABLET ORAL at 10:22

## 2019-05-01 RX ADMIN — LIDOCAINE 1 APPLICATION(S): 4 CREAM TOPICAL at 05:34

## 2019-05-01 RX ADMIN — CARBIDOPA AND LEVODOPA 2 CAPSULE(S): 25; 100 TABLET ORAL at 14:05

## 2019-05-01 RX ADMIN — Medication 1 MILLIGRAM(S): at 11:39

## 2019-05-01 NOTE — CHART NOTE - NSCHARTNOTEFT_GEN_A_CORE
NEUROLOGY: LEANN    Wife came to speak with me at the nursing station.  She reports patient is sweating but otherwise unchanged.  We reviewed the follow-up plan with the movement disorders group here at [facility] (Dr Chiu).   She is saddened by their inability to arrive for scheduled appointments with movement disorders team practice in the past.  She is looking forward to seeing the team once discharged.  She mentions that they were following at [facility], but are no longer interested in keeping those appointments.  The patient's last follow-up with movement disorders specialist was in December 2018.  His exam is significant for smooth movements without cogwheeling or tremor.    She reports that he has been taking Rasagiline for past 12 months, so that is unlikely to be the cause of his orthostatic hypotension. It is more likely to be PD related.    total time spent was 15 minutes.

## 2019-05-01 NOTE — PROGRESS NOTE ADULT - SUBJECTIVE AND OBJECTIVE BOX
Urine appears darker maroon to me  this morning, he had nonpurposeful arm/leg shaking movements, ?dystonia?  No fevers recorded    Vital Signs Last 24 Hrs  T(C): 36.7 (01 May 2019 11:32), Max: 36.7 (01 May 2019 04:32)  T(F): 98 (01 May 2019 11:32), Max: 98 (01 May 2019 04:32)  HR: 77 (01 May 2019 11:32) (73 - 101)  BP: 131/77 (01 May 2019 11:32) (100/63 - 135/70)  BP(mean): --  RR: 18 (01 May 2019 11:32) (18 - 18)  SpO2: 94% (01 May 2019 11:32) (94% - 97%)    GENERAL: NAD, chronically ill appearing caucausian man  HEAD:  Atraumatic, Normocephalic  EYES: EOMI, PERRLA, conjunctiva and sclera clear  ENMT: No tonsillar erythema, exudates, or enlargement; Moist mucous membranes, Good dentition, No lesions  NECK: Supple, No JVD, Normal thyroid  CHEST/LUNG: Clear to percussion bilaterally; No rales, rhonchi, wheezing, or rubs no resp distress or accessory muscle usage  HEART: Regular rate and rhythm; No murmurs, rubs, or gallops, trace edema b/l LE.   ABDOMEN: Soft, Nontender, Nondistended; Bowel sounds present. +mild suprapubic tenderness. no rebound/guarding.   EXTREMITIES:  2+ Peripheral Pulses, No clubbing, cyanosis, rom intact  LYMPH: No lymphadenopathy noted, no lymphangitis  SKIN: No rashes or lesions, no petechiae  NERVOUS SYSTEM:  Alert & Oriented X2-3, Good concentration; Motor Strength 5/5 B/L upper and lower extremities  PSYCH: flat affect, no delirium    LABS:                        8.0    14.89 )-----------( 496      ( 01 May 2019 08:41 )             26.2     05-01    141  |  107  |  15  ----------------------------<  83  4.3   |  23  |  1.23    Ca    8.7      01 May 2019 06:23  Phos  3.5     05-01  Mg     1.8     05-01        CAPILLARY BLOOD GLUCOSE

## 2019-05-01 NOTE — PROGRESS NOTE ADULT - SUBJECTIVE AND OBJECTIVE BOX
Patient seen and examined this AM.  Urine still hematuric, however, is lighter than previous.  Did not respond to 1U PRBCs yesterday.  UCx contaminated, on CTX.    T(F): 98, Max: 98 (04-30-19 @ 10:30)  HR: 73  BP: 100/63  SpO2: 96%    OUT:    Indwelling Catheter - Urethral: 800 mL  Total OUT: 800 mL    Gen NAD   Dark red urine in Arrieta tubing    04-30 @ 07:50  WBC 14.44 / Hct 22.3  / SCr 1.23       .Urine  04-29 Culture grew 3 or more types of organisms which indicate  collection contamination; consider recollection only if clinically  indicated.    .Blood  04-28 No growth to date. Patient seen and examined this AM.  Urine still hematuric, however, is lighter than previous.  UCx contaminated, on CTX.    T(F): 98, Max: 98 (04-30-19 @ 10:30)  HR: 73  BP: 100/63  SpO2: 96%    OUT:    Indwelling Catheter - Urethral: 800 mL  Total OUT: 800 mL    Gen NAD   Dark red urine in Arrieta tubing    04-30 @ 07:50  WBC 14.44 / Hct 22.3  / SCr 1.23       .Urine  04-29 Culture grew 3 or more types of organisms which indicate  collection contamination; consider recollection only if clinically  indicated.    .Blood  04-28 No growth to date.

## 2019-05-01 NOTE — CHART NOTE - NSCHARTNOTEFT_GEN_A_CORE
[facility] midnight rounds  found pt truing to During midnight rounds  found pt trying to clime out of bed and screaming "help me". Pt states he is hallucinating, as per pt he is seeing people who are trying to hurt him.  Calming technique ineffective, emotional comfort provider.   Pt trying to get out of bed.    Ativan 0.5mg IV push ordered.   Pt appears more comfortable.   will endorse to primary team in AM During midnight rounds  found pt trying to clime out of bed and screaming "help me". Pt states he is hallucinating, as per pt he is seeing people who are trying to hurt him. Pt admits to hallucinating on/off for some time pt states is just his " parkinson's"   Calming technique ineffective, emotional comfort provider.   Pt trying to get out of bed.        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   Pt appears more comfortable.   -consider psych consult?   will endorse to primary team in AM During midnight rounds  found pt trying to clime out of bed and screaming "help me". Pt states he is hallucinating, as per pt he is seeing people who are trying to hurt him. Pt admits to hallucinating on/off for some time pt states is just his " parkinson's"   Calming technique ineffective, emotional comfort provider.   Pt trying to get out of bed.   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   Pt appears more comfortable.   -consider psych consult?   will endorse to primary team in AM During midnight rounds  found pt trying to clime out of bed and screaming "help me". Pt states he is hallucinating, as per pt he is seeing people who are trying to hurt him. Pt admits to hallucinating on/off for some time pt states is just his " parkinson's"   Calming technique ineffective, emotional comfort provider.   Pt trying to get out of bed.   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   Pt appears more comfortable.   -consider psych consult?   - will consider CT head if AMS or condition worsens,   will endorse to primary team in AM

## 2019-05-01 NOTE — PROGRESS NOTE ADULT - ASSESSMENT
79 y/o male PMHx Prostate CA s/p XRT 2010  admitted for hypotension and tachycardia. consult for hematuria and  OSMIN, Had similar episode in the past month. Now Cr 2.36. Irrigated bladder at bed side.      Plan  -Keep núñez in place for now, no need for CBI  -Did not respond to transfusion yesterday, would give another 1-2 U PRBCs  -Patient with radiation cystitis, unlikely to find a source of bleeding to fulgurate, as patient is likely oozing diffusely from bladder  -Would hold off on intervention and monitor for continued improvement in urine color  -Continue antibiotics, hematuria is starting to improve   -Nursing to irrigate Núñez PRN  -Monitor urine color 77 y/o male PMHx Prostate CA s/p XRT 2010  admitted for hypotension and tachycardia. consult for hematuria and  OSMIN, Had similar episode in the past month. Now Cr 2.36. Irrigated bladder at bed side.      Plan  -Keep núñez in place for now, no need for CBI  -Patient with radiation cystitis, unlikely to find a source of bleeding to fulgurate, as patient is likely oozing diffusely from bladder  -Next step would include intravesical alum instillations, however will continue conservative management at this time  -Would hold off on intervention and monitor for continued improvement in urine color  -Continue antibiotics, hematuria is starting to improve   -Nursing to irrigate Núñez PRN  -Monitor urine color

## 2019-05-02 DIAGNOSIS — R45.1 RESTLESSNESS AND AGITATION: ICD-10-CM

## 2019-05-02 DIAGNOSIS — Z71.89 OTHER SPECIFIED COUNSELING: ICD-10-CM

## 2019-05-02 DIAGNOSIS — R53.81 OTHER MALAISE: ICD-10-CM

## 2019-05-02 DIAGNOSIS — Z51.5 ENCOUNTER FOR PALLIATIVE CARE: ICD-10-CM

## 2019-05-02 LAB
ANION GAP SERPL CALC-SCNC: 15 MMOL/L — SIGNIFICANT CHANGE UP (ref 5–17)
BUN SERPL-MCNC: 21 MG/DL — SIGNIFICANT CHANGE UP (ref 7–23)
CALCIUM SERPL-MCNC: 9.1 MG/DL — SIGNIFICANT CHANGE UP (ref 8.4–10.5)
CHLORIDE SERPL-SCNC: 107 MMOL/L — SIGNIFICANT CHANGE UP (ref 96–108)
CO2 SERPL-SCNC: 20 MMOL/L — LOW (ref 22–31)
CREAT SERPL-MCNC: 1.65 MG/DL — HIGH (ref 0.5–1.3)
GLUCOSE SERPL-MCNC: 78 MG/DL — SIGNIFICANT CHANGE UP (ref 70–99)
HCT VFR BLD CALC: 26.2 % — LOW (ref 39–50)
HGB BLD-MCNC: 8.1 G/DL — LOW (ref 13–17)
MCHC RBC-ENTMCNC: 28.1 PG — SIGNIFICANT CHANGE UP (ref 27–34)
MCHC RBC-ENTMCNC: 30.9 GM/DL — LOW (ref 32–36)
MCV RBC AUTO: 91 FL — SIGNIFICANT CHANGE UP (ref 80–100)
PLATELET # BLD AUTO: 562 K/UL — HIGH (ref 150–400)
POTASSIUM SERPL-MCNC: 4 MMOL/L — SIGNIFICANT CHANGE UP (ref 3.5–5.3)
POTASSIUM SERPL-SCNC: 4 MMOL/L — SIGNIFICANT CHANGE UP (ref 3.5–5.3)
RBC # BLD: 2.88 M/UL — LOW (ref 4.2–5.8)
RBC # FLD: 14.6 % — HIGH (ref 10.3–14.5)
SODIUM SERPL-SCNC: 142 MMOL/L — SIGNIFICANT CHANGE UP (ref 135–145)
WBC # BLD: 13.1 K/UL — HIGH (ref 3.8–10.5)
WBC # FLD AUTO: 13.1 K/UL — HIGH (ref 3.8–10.5)

## 2019-05-02 PROCEDURE — 99497 ADVNCD CARE PLAN 30 MIN: CPT | Mod: 25

## 2019-05-02 PROCEDURE — 99223 1ST HOSP IP/OBS HIGH 75: CPT

## 2019-05-02 RX ADMIN — Medication 1 APPLICATION(S): at 17:42

## 2019-05-02 RX ADMIN — SERTRALINE 100 MILLIGRAM(S): 25 TABLET, FILM COATED ORAL at 14:02

## 2019-05-02 RX ADMIN — CARBIDOPA AND LEVODOPA 2 CAPSULE(S): 25; 100 TABLET ORAL at 09:26

## 2019-05-02 RX ADMIN — Medication 1 APPLICATION(S): at 06:45

## 2019-05-02 RX ADMIN — CEFTRIAXONE 100 GRAM(S): 500 INJECTION, POWDER, FOR SOLUTION INTRAMUSCULAR; INTRAVENOUS at 22:19

## 2019-05-02 RX ADMIN — Medication 100 MILLIGRAM(S): at 17:41

## 2019-05-02 RX ADMIN — Medication 1 MILLIGRAM(S): at 14:02

## 2019-05-02 RX ADMIN — SIMVASTATIN 10 MILLIGRAM(S): 20 TABLET, FILM COATED ORAL at 22:19

## 2019-05-02 RX ADMIN — CARBIDOPA AND LEVODOPA 2 CAPSULE(S): 25; 100 TABLET ORAL at 17:45

## 2019-05-02 RX ADMIN — CARBIDOPA AND LEVODOPA 2 CAPSULE(S): 25; 100 TABLET ORAL at 22:15

## 2019-05-02 RX ADMIN — RASAGILINE 1 MILLIGRAM(S): 0.5 TABLET ORAL at 14:02

## 2019-05-02 RX ADMIN — CARBIDOPA AND LEVODOPA 2 CAPSULE(S): 25; 100 TABLET ORAL at 13:20

## 2019-05-02 RX ADMIN — LIDOCAINE 1 APPLICATION(S): 4 CREAM TOPICAL at 06:45

## 2019-05-02 RX ADMIN — LIDOCAINE 1 APPLICATION(S): 4 CREAM TOPICAL at 17:42

## 2019-05-02 RX ADMIN — Medication 100 MILLIGRAM(S): at 14:02

## 2019-05-02 RX ADMIN — LUBIPROSTONE 8 MICROGRAM(S): 24 CAPSULE, GELATIN COATED ORAL at 20:52

## 2019-05-02 NOTE — CONSULT NOTE ADULT - PROBLEM SELECTOR RECOMMENDATION 6
Attempts at rapport building with the patient  Pt feels as if his LT partner Elaynebeverly Martinez would be better suited for certain conversation due to his fatigue Attempts at rapport building with the patient  Pt feels as if his LT partner Elaynebeverly Martinez would be better suited for certain conversations due to his fatigue

## 2019-05-02 NOTE — PROVIDER CONTACT NOTE (OTHER) - ACTION/TREATMENT ORDERED:
PA aware, will reasess after PRBC transfusion
PA bedside assessment, reorientation. Pt still refusing medication after reattempting 1 hour later at 3 am.
ativan 0.5mg IVP given

## 2019-05-02 NOTE — CONSULT NOTE ADULT - PROBLEM SELECTOR RECOMMENDATION 9
Reports of agitation in the past, albeit transient and irregular episodes  None apparent now  Pt calm and communicative  Able to converse without incident  Reports of receiving haldol  Would not recommend haldol use in a patient with PArkinson's disease  Able to Reports of agitation in the past, albeit transient and irregular episodes  None apparent now  Pt calm and communicative  Able to converse without incident  Reports of receiving haldol  Would not recommend haldol use in a patient with Parkinson's disease (dopamine depletion from disease and dopamine antagonism from Rx)  In the event of hypoactive delirium, consider nonpharmacologic strategies

## 2019-05-02 NOTE — CONSULT NOTE ADULT - PROBLEM SELECTOR RECOMMENDATION 3
Pt would like his LT partner of 25 years to be his HCP and his son to be an alternate  1. Elayne Martinez  2. Akash (his son)  Consider SW involvement to aid in health care proxy assignment Pt would like his LT partner of 25 years to be his HCP and his son to be an alternate  Time spent ACP time 16 min  1. Elayne Martinez  2. Akash (his son)  Consider SW involvement to aid completing health care proxy assignment

## 2019-05-02 NOTE — PROVIDER CONTACT NOTE (OTHER) - BACKGROUND
pt dx hypotension hx advanced Parkinson's
admit for hypotension
pt dx hypotension w denise Arrieta. advanced Parkinson's disease

## 2019-05-02 NOTE — PROGRESS NOTE ADULT - ATTENDING COMMENTS
wife at bedside crying she would like to get a diagnosis from Neurology   to decide how to make long term decisions

## 2019-05-02 NOTE — PROGRESS NOTE ADULT - ASSESSMENT
78m with parkinsons, indwelling núñez, radiation cystitis, admitted with   tachycardia, hypotension and sushant--- similar to last admission-- now resolved  leukocytosis-- wbc elevated throughout last admission in [place]

## 2019-05-02 NOTE — CONSULT NOTE ADULT - PROBLEM SELECTOR RECOMMENDATION 4
Hx of prostate cancer 2010 s/p EBRT and subsequent Hx of hemorrhagic cystitis  Chronic núñez Hx of prostate cancer 2010 s/p EBRT and subsequent Hx of hemorrhagic cystitis  Chronic Arrieta with hematuria  No signs of ACS or AMS  No symptomatic anemia  Hx of prostate cancer and TURP

## 2019-05-02 NOTE — CONSULT NOTE ADULT - SUBJECTIVE AND OBJECTIVE BOX
HPI:  78 M PMH of HTN, HLD, prostate cancer s/p RT c/b hemorrhagic radiation cystitis, chronic indwelling núñez after admission last month with multiple issues of núñez clogging and retention, Parkinson's disease, recent admit for obstructive uropathy 2/2 núñez issues, now presents from rehab with hypotension and tachycardia. Call placed to Nursing supervisor at [facility] ([phone]) who states that pt was noted to have pulse to 150 irregular concerning for afib? (no ekg done) and was noted to have hypotension to 80s/40s. Afebrile 97.5 at rehab. Generally not appearing well per family to brought to ER. Also less able to participate with PT 2/2 weakness.  Pt is suboptimal historian; he is able to state that he has ongoing issues with hematuria related to his núñez.  He denies cp/sob/f/c/n/v/d, unk dysuria, denies cough/rash. Pt is unsure quantity of urine that has been draining or when núñez was last changed. Denies abd pain, back pain, visual changes, headaches.     Of note pt did have RRT called for syncopal event that occurred during transfer from [facility] after receiving cervical MRI on prior admission.  Sx attributed to orthostatic hypotension, given IVF.     Vs: 98.3, 72, 74/47 -->135/74, 18, 97% RA. Labs: Chronic uptrending leukocytosis since end of march wbc 20.1 here, chronic stable anemia, chronic thrombocytosis, HST delta 26 à18, cmp with new sushant scr 2.36 b/l 1.1. UA grossly + in setting of chronic indwelling núñez. CXR clear lungs. Given 2L IVF in ER prior to medicine team involvement. (28 Apr 2019 21:54)    PERTINENT PM/SXH:   Gout  Prostate cancer  HLD (hyperlipidemia)  HTN (hypertension)  Depression  Parkinson's disease    S/P TURP    FAMILY HISTORY:  No pertinent family history in first degree relatives    ITEMS NOT CHECKED ARE NOT PRESENT    SOCIAL HISTORY:   Significant other/partner:  [ ]  Children:  [ ]  Buddhism/Spirituality:  Substance hx:  [ ]   Tobacco hx:  [ ]   Alcohol hx: [ ]   Home Opioid hx:  [ ] I-Stop Reference No:  Living Situation: [ ]Home  [ ]Long term care  [ ]Rehab [ ]Other    ADVANCE DIRECTIVES:    DNR  Yes  MOLST  [ ]  Living Will  [ ]   DECISION MAKER(s):  [ ] Health Care Proxy(s)  [ ] Surrogate(s)  [ ] Guardian           Name(s): Phone Number(s):    BASELINE (I)ADL(s) (prior to admission):  [place]: [ ]Total  [ ] Moderate [ ]Dependent    Allergies    No Known Allergies    Intolerances    MEDICATIONS  (STANDING):  allopurinol 100 milliGRAM(s) Oral daily  amantadine 100 milliGRAM(s) Oral every 12 hours  carbidopa 36.25 mG/levodopa 145 mG ER 2 Capsule(s) Oral <User Schedule>  cefTRIAXone   IVPB 1 Gram(s) IV Intermittent every 24 hours  cefTRIAXone   IVPB      docusate sodium 100 milliGRAM(s) Oral two times a day  erythromycin   Ointment 1 Application(s) Both EYES two times a day  folic acid 1 milliGRAM(s) Oral daily  lidocaine 2% Gel 1 Application(s) Topical two times a day  lubiprostone 8 MICROGram(s) Oral two times a day  rasagiline Tablet 1 milliGRAM(s) Oral daily  sertraline 100 milliGRAM(s) Oral daily  simvastatin 10 milliGRAM(s) Oral at bedtime  sodium chloride 0.9%. 1000 milliLiter(s) (75 mL/Hr) IV Continuous <Continuous>    MEDICATIONS  (PRN):  acetaminophen   Tablet .. 650 milliGRAM(s) Oral every 6 hours PRN Temp greater or equal to 38C (100.4F), Mild Pain (1 - 3), Moderate Pain (4 - 6), Severe Pain (7 - 10)  polyethylene glycol 3350 17 Gram(s) Oral daily PRN Constipation    PRESENT SYMPTOMS: [ ]Unable to obtain due to poor mentation   Source if other than patient:  [ ]Family   [ ]Team     Pain (Impact on QOL):    Location -         Minimal acceptable level (0-10 scale):                    Aggravating factors -  Quality -  Radiation -  Severity (0-10 scale) -    Timing -    PAIN AD Score:     http://geriatrictoolkit.[place].[facility]/cog/painad.pdf (press ctrl +  left click to view)    Dyspnea:                           [ ]Mild [ ]Moderate [ ]Severe  Anxiety:                             [ ]Mild [ ]Moderate [ ]Severe  Fatigue:                             [ ]Mild [ ]Moderate [ ]Severe  Nausea:                             [ ]Mild [ ]Moderate [ ]Severe  Loss of appetite:              [ ]Mild [ ]Moderate [ ]Severe  Constipation:                    [ ]Mild [ ]Moderate [ ]Severe    Other Symptoms:  [ ]All other review of systems negative     Karnofsky Performance Score/Palliative Performance Status Version 2:         %    http://palliative.info/resource_material/PPSv2.pdf  PHYSICAL EXAM:  Vital Signs Last 24 Hrs  T(C): 36.6 (02 May 2019 14:03), Max: 36.7 (01 May 2019 21:09)  T(F): 97.8 (02 May 2019 14:03), Max: 98.1 (01 May 2019 21:09)  HR: 66 (02 May 2019 14:03) (64 - 66)  BP: 123/71 (02 May 2019 14:03) (106/62 - 123/71)  BP(mean): --  RR: 18 (02 May 2019 14:03) (18 - 18)  SpO2: 98% (02 May 2019 14:03) (95% - 98%) I&O's Summary    01 May 2019 07:01  -  02 May 2019 07:00  --------------------------------------------------------  IN: 190 mL / OUT: 800 mL / NET: -610 mL    02 May 2019 07:01  -  02 May 2019 14:10  --------------------------------------------------------  IN: 280 mL / OUT: 300 mL / NET: -20 mL    GENERAL:  [ ]Alert  [ ]Oriented x   [ ]Lethargic  [ ]Cachexia  [ ]Unarousable  [ ]Verbal  [ ]Non-Verbal  Behavioral:   [ ] Anxiety  [ ] Delirium [ ] Agitation [ ] Other  HEENT:  [ ]Normal   [ ]Dry mouth   [ ]ET Tube/Trach  [ ]Oral lesions  PULMONARY:   [ ]Clear [ ]Tachypnea  [ ]Audible excessive secretions   [ ]Rhonchi        [ ]Right [ ]Left [ ]Bilateral  [ ]Crackles        [ ]Right [ ]Left [ ]Bilateral  [ ]Wheezing     [ ]Right [ ]Left [ ]Bilateral  CARDIOVASCULAR:    [ ]Regular [ ]Irregular [ ]Tachy  [ ]Amor [ ]Murmur [ ]Other  GASTROINTESTINAL:  [ ]Soft  [ ]Distended   [ ]+BS  [ ]Non tender [ ]Tender  [ ]PEG [ ]OGT/ NGT  Last BM:   GENITOURINARY:  [ ]Normal [ ] Incontinent   [ ]Oliguria/Anuria   [ ]Núñez  MUSCULOSKELETAL:   [ ]Normal   [ ]Weakness  [ ]Bed/Wheelchair bound [ ]Edema  NEUROLOGIC:   [ ]No focal deficits  [ ] Cognitive impairment  [ ] Dysphagia [ ]Dysarthria [ ] Paresis [ ]Other   SKIN:   [ ]Normal   [ ]Pressure ulcer(s)  [ ]Rash    CRITICAL CARE:  [ ] Shock Present  [ ]Septic [ ]Cardiogenic [ ]Neurologic [ ]Hypovolemic  [ ]  Vasopressors [ ]  Inotropes   [ ] Respiratory failure present  [ ] Acute  [ ] Chronic [ ] Hypoxic  [ ] Hypercarbic [ ] Other  [ ] Other organ failure     LABS:                        8.1    13.10 )-----------( 562      ( 02 May 2019 08:33 )             26.2   05-02    142  |  107  |  21  ----------------------------<  78  4.0   |  20<L>  |  1.65<H>    Ca    9.1      02 May 2019 05:35  Phos  3.5     05-01  Mg     1.8     05-01          RADIOLOGY & ADDITIONAL STUDIES:    PROTEIN CALORIE MALNUTRITION PRESENT: [ ] Yes [ ] No  [ ] PPSV2 < or = to 30% [ ] significant weight loss  [ ] poor nutritional intake [ ] catabolic state [ ] anasarca     Albumin, Serum: 2.7 g/dL (04-29-19 @ 06:45)  Artificial Nutrition [ ]     REFERRALS:   [ ]Chaplaincy  [ ] Hospice  [ ]Child Life  [ ]Social Work  [ ]Case management [ ]Holistic Therapy   Goals of Care Discussion Document: HPI:  78 M PMH of HTN, HLD, prostate cancer s/p RT c/b hemorrhagic radiation cystitis, chronic indwelling núñez after admission last month with multiple issues of núñez clogging and retention, Parkinson's disease, recent admit for obstructive uropathy 2/2 núñez issues, now presents from rehab with hypotension and tachycardia. [facility] ([phone][facility] who states that pt was noted to have pulse to 150 irregular concerning for afib? (no ekg done) and was noted to have hypotension to 80s/40s. Afebrile 97.5 at rehab. Generally not appearing well per family to brought to ER. Also less able to participate with PT 2/2 weakness.  Pt is suboptimal historian; he is able to state that he has ongoing issues with hematuria related to his núñez.  He denies cp/sob/f/c/n/v/d, unk dysuria, denies cough/rash. Pt is unsure quantity of urine that has been draining or when núñez was last changed. Denies abd pain, back pain, visual changes, headaches.     Of note pt did have RRT called for syncopal event that occurred during transfer from [facility] after receiving cervical MRI on prior admission.  Sx attributed to orthostatic hypotension, given IVF.     Vs: 98.3, 72, 74/47 -->135/74, 18, 97% RA. Labs: Chronic uptrending leukocytosis since end of march wbc 20.1 here, chronic stable anemia, chronic thrombocytosis, HST delta 26 à18, cmp with new sushant scr 2.36 b/l 1.1. UA grossly + in setting of chronic indwelling núñez. CXR clear lungs. Given 2L IVF in ER prior to medicine team involvement. (28 Apr 2019 21:54)    PERTINENT PM/SXH:   Gout  Prostate cancer  HLD (hyperlipidemia)  HTN (hypertension)  Depression  Parkinson's disease    S/P TURP    FAMILY HISTORY:  No pertinent family history in first degree relatives    ITEMS NOT CHECKED ARE NOT PRESENT    SOCIAL HISTORY:   Significant other/partner:  [x ]  Children:  [x ]  Mandaeism/Spirituality:  Substance hx:  [ ]   Tobacco hx:  [x ]   Alcohol hx: [ ]   Home Opioid hx:  [ ] I-Stop Reference No:  Living Situation: [ x]Home  [ ]Long term care  [ ]Rehab [ ]Other    ADVANCE DIRECTIVES:    DNR  Yes  MOLST  [x ]  Living Will  [ ]   DECISION MAKER(s):  [ ] Health Care Proxy(s)  [ ] Surrogate(s)  [ ] Guardian           Name(s): Phone Number(s):    BASELINE (I)ADL(s) (prior to admission):  [place]: [ ]Total  [ ] Moderate [ ]Dependent    Allergies    No Known Allergies    Intolerances    MEDICATIONS  (STANDING):  allopurinol 100 milliGRAM(s) Oral daily  amantadine 100 milliGRAM(s) Oral every 12 hours  carbidopa 36.25 mG/levodopa 145 mG ER 2 Capsule(s) Oral <User Schedule>  cefTRIAXone   IVPB 1 Gram(s) IV Intermittent every 24 hours  cefTRIAXone   IVPB      docusate sodium 100 milliGRAM(s) Oral two times a day  erythromycin   Ointment 1 Application(s) Both EYES two times a day  folic acid 1 milliGRAM(s) Oral daily  lidocaine 2% Gel 1 Application(s) Topical two times a day  lubiprostone 8 MICROGram(s) Oral two times a day  rasagiline Tablet 1 milliGRAM(s) Oral daily  sertraline 100 milliGRAM(s) Oral daily  simvastatin 10 milliGRAM(s) Oral at bedtime  sodium chloride 0.9%. 1000 milliLiter(s) (75 mL/Hr) IV Continuous <Continuous>    MEDICATIONS  (PRN):  acetaminophen   Tablet .. 650 milliGRAM(s) Oral every 6 hours PRN Temp greater or equal to 38C (100.4F), Mild Pain (1 - 3), Moderate Pain (4 - 6), Severe Pain (7 - 10)  polyethylene glycol 3350 17 Gram(s) Oral daily PRN Constipation    PRESENT SYMPTOMS: [ ]Unable to obtain due to poor mentation   Source if other than patient:  [ ]Family   [ ]Team     Pain (Impact on QOL):    Location -         Minimal acceptable level (0-10 scale):                    Aggravating factors -  Quality -  Radiation -  Severity (0-10 scale) -    Timing -    PAIN AD Score:     http://geriatrictoolkit.[email or web address].[email or web address]/cog/painad.pdf (press ctrl +  left click to view)    Dyspnea:                           [ ]Mild [ ]Moderate [ ]Severe  Anxiety:                             [ ]Mild [ ]Moderate [ ]Severe  Fatigue:                             [x ]Mild [ ]Moderate [ ]Severe  Nausea:                             [ ]Mild [ ]Moderate [ ]Severe  Loss of appetite:              [ ]Mild [ ]Moderate [ ]Severe  Constipation:                    [ ]Mild [ ]Moderate [ ]Severe    Other Symptoms:  [ ]All other review of systems negative     Karnofsky Performance Score/Palliative Performance Status Version 2:      50   %    http://palliative.info/resource_material/PPSv2.pdf  PHYSICAL EXAM:  Vital Signs Last 24 Hrs  T(C): 36.6 (02 May 2019 14:03), Max: 36.7 (01 May 2019 21:09)  T(F): 97.8 (02 May 2019 14:03), Max: 98.1 (01 May 2019 21:09)  HR: 66 (02 May 2019 14:03) (64 - 66)  BP: 123/71 (02 May 2019 14:03) (106/62 - 123/71)  BP(mean): --  RR: 18 (02 May 2019 14:03) (18 - 18)  SpO2: 98% (02 May 2019 14:03) (95% - 98%) I&O's Summary    01 May 2019 07:01  -  02 May 2019 07:00  --------------------------------------------------------  IN: 190 mL / OUT: 800 mL / NET: -610 mL    02 May 2019 07:01  -  02 May 2019 14:10  --------------------------------------------------------  IN: 280 mL / OUT: 300 mL / NET: -20 mL    GENERAL:  [ x]Alert  [ ]Oriented x   [ ]Lethargic  [ ]Cachexia  [ ]Unarousable  [ ]Verbal  [ ]Non-Verbal  Behavioral:   [ ] Anxiety  [ ] Delirium [ ] Agitation [x ] Other Calm  HEENT:  [x ]Normal   [ ]Dry mouth   [ ]ET Tube/Trach  [ ]Oral lesions  PULMONARY:   [x ]Clear [ ]Tachypnea  [ ]Audible excessive secretions   [ ]Rhonchi        [ ]Right [ ]Left [ ]Bilateral  [ ]Crackles        [ ]Right [ ]Left [ ]Bilateral  [ ]Wheezing     [ ]Right [ ]Left [ ]Bilateral  CARDIOVASCULAR:    [x ]Regular [ ]Irregular [ ]Tachy  [ ]Amor [ ]Murmur [ ]Other  GASTROINTESTINAL:  [ x]Soft  [ ]Distended   [ ]+BS  [ ]Non tender [ ]Tender  [ ]PEG [ ]OGT/ NGT  Last BM:   GENITOURINARY:  [ ]Normal [ ] Incontinent   [ ]Oliguria/Anuria   [x ]Núñez with hematuria  MUSCULOSKELETAL:   [ ]Normal   [x ]Weakness  [ ]Bed/Wheelchair bound [ ]Edema  NEUROLOGIC:   [xx ]No focal deficits  [ ] Cognitive impairment  [ ] Dysphagia [ ]Dysarthria [ ] Paresis [ ]Other   SKIN:   [ x]Normal   [ ]Pressure ulcer(s)  [ ]Rash    CRITICAL CARE:  [ ] Shock Present  [ ]Septic [ ]Cardiogenic [ ]Neurologic [ ]Hypovolemic  [ ]  Vasopressors [ ]  Inotropes   [ ] Respiratory failure present  [ ] Acute  [ ] Chronic [ ] Hypoxic  [ ] Hypercarbic [ ] Other  [ ] Other organ failure     LABS:                        8.1    13.10 )-----------( 562      ( 02 May 2019 08:33 )             26.2   05-02    142  |  107  |  21  ----------------------------<  78  4.0   |  20<L>  |  1.65<H>    Ca    9.1      02 May 2019 05:35  Phos  3.5     05-01  Mg     1.8     05-01          RADIOLOGY & ADDITIONAL STUDIES:    PROTEIN CALORIE MALNUTRITION PRESENT: [ ] Yes [ ] No  [ ] PPSV2 < or = to 30% [ ] significant weight loss  [ ] poor nutritional intake [ ] catabolic state [ ] anasarca     Albumin, Serum: 2.7 g/dL (04-29-19 @ 06:45)  Artificial Nutrition [ ]     REFERRALS:   [ ]Chaplaincy  [ ] Hospice  [ ]Child Life  [ ]Social Work  [ ]Case management [ ]Holistic Therapy   Goals of Care Discussion Document:

## 2019-05-02 NOTE — CONSULT NOTE ADULT - PROBLEM SELECTOR RECOMMENDATION 5
Neurology contacted to discuss reiteration of principal diagnosis with the partner  On Carbidopa/levodopa Neurology contacted to discuss reiteration of principal diagnosis with the partner  On Carbidopa/levodopa, attempt to preserve timely administration

## 2019-05-02 NOTE — CONSULT NOTE ADULT - ATTENDING COMMENTS
patient seen and examined with housestaff on 4/30/19    Briefly, 77 yo RH man with Parkinson's disease, HTN, HL, now with prostate CA, s/p resection, s/p RT (Dr Summers), admitted for urinary retnetion and some radiation cystitis issues. Has been in/out of hospital/rehab environments for past 4 weeks, deconditioned and presently having trouble walking. Most recently, had elevated HR and low BP. 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  In past, he had more significant tremor, saw movement disorders expert, and had his PD medications adjusted to reduce dyskinesias. At this time, he has some dyskinesias, no tremor, little rigidity. Partner at bedside with questions.    EXAM  2 weeks ago, was see by Neurology resident who is present here today. That resident noted left sided cogwheel rigidity. As legs were also rigid, but more bilaterally, MRI C spine was performed to rule out cervical disc impinging on spinal cord. MR was unremarkable.  AOx3  voice somewhat hypophonic.  follows all commands  no glabellar reflexe  left eye is opaque from cataract.  facies symmetrical  no tremor  no cogwheeling, until contralateral hand taps, then the right arm has cogwheeling, but not the left.  declines ambulation.    IMPRESSION/PLAN  77 yo RH man with multiple and prolonged hospitalizations for prostate CA related challenges, also with PD and recent low BP high HR.    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 I asked about impotence preceding the movement disorder and he did have that. Of course, his advanced age may have played a role and the patients described by Zabrina Luna were <40. Moreover, PD can also have autonomic dysfunction. I explained that there is no magic treatment for MSA-P and that the autonomic symptoms are handled the same in the setting of parkinson's disease and MSA-P. All questions answered to their satisfaction.    HYPOTension - I recommend increased salt in diet. Careful attention to staying hydrated. Consider lowering or elimination of rasagiline, which has a high adverse effect rate of orthostatic hypotension. Curiously, the combination of sertraline and rasagiline (an MAOinhibitor) can cause hypertension, which he does not have. For this reason, "Concurrent use of sertraline and an MAOI, such as rasagiline, is contraindicated. Wait at least 14 days after discontinuing rasagiline before initiating sertraline. Wait at least 14 days after discontinuing sertraline before initiating therapy with rasagiline (Prod Info ZOLOFT® oral tablets concentrate, 2012)."    PARKINSON'S -- I doubt he has MSA-P. Would continue other PD medications.    WALKING -- likely to benefit from PT/OT and rehab stay.    DISPO -- to follow-up with movement disorders team.    total time spent was 36 minutes
Case discussed with PA.  Staff raised concerns over the need for different outpatient options/resources for the significant other.  No outpatient palliative home based service program at this time. Home care services would need to be administered through [facility] (they would be able to speak to eligibility, extent of services, and duration of services).  Contact and confer with case management for a referral if home care is being sought.  It is very unlikely that this patient would be an inpatient hospice candidate given his minimal symptom burden  If a residential component is needed due to increasing care needs, LTC placement options will either need a second payor source (insurance or personal funds)  The patient is still able to tolerate po  The patient has minimal symptom burden  The patient is not a candidate for the PCU at this time  Will confer with the LT partner to clarify the request and help to direct her to resources which are goal concordant ([phone])  Management per primary team

## 2019-05-02 NOTE — PROGRESS NOTE ADULT - SUBJECTIVE AND OBJECTIVE BOX
Patient is a 78y old  Male who presents with a chief complaint of tachycardia, hypotension, sushant (02 May 2019 10:46)    Pt agitated last night, received haldol  He seems clearer during daytime  Urine still maroon colored  Remains on ceftriaxone  Spouse requesting if Dr. Thrasher or colleage can see pt in hospital    Vital Signs Last 24 Hrs  T(C): 36.4 (02 May 2019 05:01), Max: 36.7 (01 May 2019 21:09)  T(F): 97.6 (02 May 2019 05:01), Max: 98.1 (01 May 2019 21:09)  HR: 64 (02 May 2019 05:01) (64 - 65)  BP: 119/74 (02 May 2019 05:01) (106/62 - 119/74)  BP(mean): --  RR: 18 (02 May 2019 05:01) (18 - 18)  SpO2: 95% (02 May 2019 05:01) (95% - 95%)    GENERAL: NAD, chronically ill appearing caucausian man  HEAD:  Atraumatic, Normocephalic  EYES: EOMI, PERRLA, conjunctiva and sclera clear  ENMT: No tonsillar erythema, exudates, or enlargement; Moist mucous membranes, Good dentition, No lesions  NECK: Supple, No JVD, Normal thyroid  CHEST/LUNG: Clear to percussion bilaterally; No rales, rhonchi, wheezing, or rubs no resp distress or accessory muscle usage  HEART: Regular rate and rhythm; No murmurs, rubs, or gallops, trace edema b/l LE.   ABDOMEN: Soft, Nontender, Nondistended; Bowel sounds present. +mild suprapubic tenderness. no rebound/guarding.   EXTREMITIES:  2+ Peripheral Pulses, No clubbing, cyanosis, rom intact  LYMPH: No lymphadenopathy noted, no lymphangitis  SKIN: No rashes or lesions, no petechiae  NERVOUS SYSTEM:  Alert & Oriented X2-3, Good concentration; Motor Strength 5/5 B/L upper and lower extremities  PSYCH: flat affect, no delirium    LABS:                        8.1    13.10 )-----------( 562      ( 02 May 2019 08:33 )             26.2     05-02    142  |  107  |  21  ----------------------------<  78  4.0   |  20<L>  |  1.65<H>    Ca    9.1      02 May 2019 05:35  Phos  3.5     05-01  Mg     1.8     05-01        CAPILLARY BLOOD GLUCOSE

## 2019-05-02 NOTE — CONSULT NOTE ADULT - ASSESSMENT
HPI:  78 M PMH of HTN, HLD, prostate cancer s/p RT c/b hemorrhagic radiation cystitis, chronic indwelling núñez after admission last month with multiple issues of núñez clogging and retention, Parkinson's disease, consulted by staff after reports request by the partner to discuss outpatient resources

## 2019-05-02 NOTE — PROGRESS NOTE ADULT - SUBJECTIVE AND OBJECTIVE BOX
[facility], Division of Infectious Diseases  GARTH Jasmine A. Lee  [phone]    Name: TANK VARGAS  Age: 78y  Gender: Male  MRN: [id number]    Interval History--  Notes reviewed-- agitated over night  wife at bedside   pt calm sleeping.    Past Medical History--  Gout  Prostate cancer  HLD (hyperlipidemia)  HTN (hypertension)  Depression  Parkinson's disease  S/P TURP      For details regarding the patient's social history, family history, and other miscellaneous elements, please refer the initial infectious diseases consultation and/or the admitting history and physical examination for this admission.    Allergies    No Known Allergies    Intolerances        Medications--  Antibiotics:  cefTRIAXone   IVPB 1 Gram(s) IV Intermittent every 24 hours  cefTRIAXone   IVPB        Immunologic:    Other:  acetaminophen   Tablet .. PRN  allopurinol  amantadine  carbidopa 36.25 mG/levodopa 145 mG ER  docusate sodium  erythromycin   Ointment  folic acid  lidocaine 2% Gel  lubiprostone  polyethylene glycol 3350 PRN  rasagiline Tablet  sertraline  simvastatin  sodium chloride 0.9%.      Review of Systems--  A 10-point review of systems was obtained.   unable to obtain  Review of systems otherwise negative except as previously noted.    Physical Examination--  Vital Signs: T(F): 97.6 (05-02-19 @ 05:01), Max: 98.1 (05-01-19 @ 21:09)  HR: 64 (05-02-19 @ 05:01)  BP: 119/74 (05-02-19 @ 05:01)  RR: 18 (05-02-19 @ 05:01)  SpO2: 95% (05-02-19 @ 05:01)  Wt(kg): --  General: Nontoxic-appearing Male in no acute distress.  HEENT: AT/NC. . Anicteric. Conjunctiva pink and moist. Oropharynx clear. Dentition fair.  Neck: Not rigid. No sense of mass.  Nodes: None palpable.  Lungs: Clear bilaterally without rales, wheezing or rhonchi  Heart: Regular rate and rhythm. No Murmur. No rub.   Abdomen: Bowel sounds present and normoactive. Soft. Nondistended. Nontender.   gu núñez with hematuria.   Extremities: No cyanosis or clubbing. No edema.   Skin: Warm. Dry. Good turgor. No rash. No vasculitic stigmata.  Psychiatric: lethargic         Laboratory Studies--  CBC                        8.1    13.10 )-----------( 562      ( 02 May 2019 08:33 )             26.2       Chemistries  05-02    142  |  107  |  21  ----------------------------<  78  4.0   |  20<L>  |  1.65<H>    Ca    9.1      02 May 2019 05:35  Phos  3.5     05-01  Mg     1.8     05-01        Culture Data    Culture - Urine (collected 29 Apr 2019 05:55)  Source: .Urine  Final Report (30 Apr 2019 12:27):    Culture grew 3 or more types of organisms which indicate    collection contamination; consider recollection only if clinically    indicated.    Culture - Blood (collected 28 Apr 2019 19:39)  Source: .Blood  Preliminary Report (29 Apr 2019 20:01):    No growth to date.    Culture - Blood (collected 28 Apr 2019 19:39)  Source: .Blood  Preliminary Report (29 Apr 2019 20:01):    No growth to date.

## 2019-05-02 NOTE — PROVIDER CONTACT NOTE (OTHER) - SITUATION
pt refusing 2am dose of carbidopa.
could not get complete orthostatic blood pressure measurement; lying only, patient was too weak to stay sitting for accurate BP
pt episode of acute agitation,

## 2019-05-03 LAB
ANION GAP SERPL CALC-SCNC: 12 MMOL/L — SIGNIFICANT CHANGE UP (ref 5–17)
BUN SERPL-MCNC: 22 MG/DL — SIGNIFICANT CHANGE UP (ref 7–23)
CALCIUM SERPL-MCNC: 9.1 MG/DL — SIGNIFICANT CHANGE UP (ref 8.4–10.5)
CHLORIDE SERPL-SCNC: 108 MMOL/L — SIGNIFICANT CHANGE UP (ref 96–108)
CO2 SERPL-SCNC: 22 MMOL/L — SIGNIFICANT CHANGE UP (ref 22–31)
CREAT SERPL-MCNC: 1.45 MG/DL — HIGH (ref 0.5–1.3)
CULTURE RESULTS: SIGNIFICANT CHANGE UP
CULTURE RESULTS: SIGNIFICANT CHANGE UP
GLUCOSE BLDC GLUCOMTR-MCNC: 103 MG/DL — HIGH (ref 70–99)
GLUCOSE SERPL-MCNC: 99 MG/DL — SIGNIFICANT CHANGE UP (ref 70–99)
HCT VFR BLD CALC: 26.2 % — LOW (ref 39–50)
HGB BLD-MCNC: 8 G/DL — LOW (ref 13–17)
MCHC RBC-ENTMCNC: 28.2 PG — SIGNIFICANT CHANGE UP (ref 27–34)
MCHC RBC-ENTMCNC: 30.5 GM/DL — LOW (ref 32–36)
MCV RBC AUTO: 92.3 FL — SIGNIFICANT CHANGE UP (ref 80–100)
PLATELET # BLD AUTO: 571 K/UL — HIGH (ref 150–400)
POTASSIUM SERPL-MCNC: 4.1 MMOL/L — SIGNIFICANT CHANGE UP (ref 3.5–5.3)
POTASSIUM SERPL-SCNC: 4.1 MMOL/L — SIGNIFICANT CHANGE UP (ref 3.5–5.3)
RBC # BLD: 2.84 M/UL — LOW (ref 4.2–5.8)
RBC # FLD: 14.7 % — HIGH (ref 10.3–14.5)
SODIUM SERPL-SCNC: 142 MMOL/L — SIGNIFICANT CHANGE UP (ref 135–145)
SPECIMEN SOURCE: SIGNIFICANT CHANGE UP
SPECIMEN SOURCE: SIGNIFICANT CHANGE UP
WBC # BLD: 14.03 K/UL — HIGH (ref 3.8–10.5)
WBC # FLD AUTO: 14.03 K/UL — HIGH (ref 3.8–10.5)

## 2019-05-03 RX ADMIN — Medication 100 MILLIGRAM(S): at 12:20

## 2019-05-03 RX ADMIN — Medication 100 MILLIGRAM(S): at 17:34

## 2019-05-03 RX ADMIN — Medication 0.5 MILLIGRAM(S): at 22:48

## 2019-05-03 RX ADMIN — CARBIDOPA AND LEVODOPA 2 CAPSULE(S): 25; 100 TABLET ORAL at 22:18

## 2019-05-03 RX ADMIN — Medication 100 MILLIGRAM(S): at 06:52

## 2019-05-03 RX ADMIN — CARBIDOPA AND LEVODOPA 2 CAPSULE(S): 25; 100 TABLET ORAL at 02:00

## 2019-05-03 RX ADMIN — LIDOCAINE 1 APPLICATION(S): 4 CREAM TOPICAL at 06:52

## 2019-05-03 RX ADMIN — SIMVASTATIN 10 MILLIGRAM(S): 20 TABLET, FILM COATED ORAL at 22:18

## 2019-05-03 RX ADMIN — Medication 1 APPLICATION(S): at 17:34

## 2019-05-03 RX ADMIN — LUBIPROSTONE 8 MICROGRAM(S): 24 CAPSULE, GELATIN COATED ORAL at 17:34

## 2019-05-03 RX ADMIN — CARBIDOPA AND LEVODOPA 2 CAPSULE(S): 25; 100 TABLET ORAL at 17:39

## 2019-05-03 RX ADMIN — Medication 1 MILLIGRAM(S): at 12:20

## 2019-05-03 RX ADMIN — LIDOCAINE 1 APPLICATION(S): 4 CREAM TOPICAL at 17:35

## 2019-05-03 RX ADMIN — LUBIPROSTONE 8 MICROGRAM(S): 24 CAPSULE, GELATIN COATED ORAL at 06:52

## 2019-05-03 RX ADMIN — CEFTRIAXONE 100 GRAM(S): 500 INJECTION, POWDER, FOR SOLUTION INTRAMUSCULAR; INTRAVENOUS at 22:49

## 2019-05-03 RX ADMIN — SERTRALINE 100 MILLIGRAM(S): 25 TABLET, FILM COATED ORAL at 12:20

## 2019-05-03 RX ADMIN — CARBIDOPA AND LEVODOPA 2 CAPSULE(S): 25; 100 TABLET ORAL at 09:11

## 2019-05-03 RX ADMIN — Medication 1 APPLICATION(S): at 06:52

## 2019-05-03 RX ADMIN — CARBIDOPA AND LEVODOPA 2 CAPSULE(S): 25; 100 TABLET ORAL at 13:45

## 2019-05-03 RX ADMIN — CARBIDOPA AND LEVODOPA 2 CAPSULE(S): 25; 100 TABLET ORAL at 06:53

## 2019-05-03 RX ADMIN — Medication 100 MILLIGRAM(S): at 17:37

## 2019-05-03 RX ADMIN — RASAGILINE 1 MILLIGRAM(S): 0.5 TABLET ORAL at 12:20

## 2019-05-03 NOTE — PROGRESS NOTE ADULT - SUBJECTIVE AND OBJECTIVE BOX
Pt seen by palliative care yesterday  He had a somewhat agitated night again, but seems lucid during daytime  Urine remains maroon-colored    Vital Signs Last 24 Hrs  T(C): 36.5 (03 May 2019 04:44), Max: 36.6 (02 May 2019 14:03)  T(F): 97.7 (03 May 2019 04:44), Max: 97.8 (02 May 2019 14:03)  HR: 72 (03 May 2019 04:44) (66 - 72)  BP: 137/82 (03 May 2019 04:44) (118/72 - 137/82)  BP(mean): --  RR: 18 (03 May 2019 04:44) (18 - 18)  SpO2: 97% (03 May 2019 04:44) (97% - 98%)    GENERAL: NAD, chronically ill appearing caucausian man  HEAD:  Atraumatic, Normocephalic  EYES: EOMI, PERRLA, conjunctiva and sclera clear  ENMT: No tonsillar erythema, exudates, or enlargement; Moist mucous membranes, Good dentition, No lesions  NECK: Supple, No JVD, Normal thyroid  CHEST/LUNG: Clear to percussion bilaterally; No rales, rhonchi, wheezing, or rubs no resp distress or accessory muscle usage  HEART: Regular rate and rhythm; No murmurs, rubs, or gallops, trace edema b/l LE.   ABDOMEN: Soft, Nontender, Nondistended; Bowel sounds present. +mild suprapubic tenderness. no rebound/guarding.   EXTREMITIES:  2+ Peripheral Pulses, No clubbing, cyanosis, rom intact  LYMPH: No lymphadenopathy noted, no lymphangitis  SKIN: No rashes or lesions, no petechiae  NERVOUS SYSTEM:  Alert & Oriented X2-3, Good concentration; Motor Strength 5/5 B/L upper and lower extremities  PSYCH: flat affect, no delirium      LABS:                        8.0    14.03 )-----------( 571      ( 03 May 2019 07:36 )             26.2     05-03    142  |  108  |  22  ----------------------------<  99  4.1   |  22  |  1.45<H>    Ca    9.1      03 May 2019 05:20        CAPILLARY BLOOD GLUCOSE

## 2019-05-03 NOTE — PROGRESS NOTE ADULT - SUBJECTIVE AND OBJECTIVE BOX
Feeling well, spasms with irrigations, otherwise concerned about color only    T(F): 98.2, Max: 98.2 (05-03-19 @ 13:05)  HR: 75  BP: 102/64  SpO2: 98%    OUT:    Indwelling Catheter - Urethral: 250 mL  Total OUT: 250 mL    Gen NAD  Abd soft, NTND   núñez merlot color, irrigated only 5-10 small soft clots, cleared quickly. Left to drainage      05-03 @ 07:36    WBC 14.03 / Hct 26.2  / SCr --       05-03 @ 05:20    WBC --    / Hct --    / SCr 1.45     .Urine  04-29  Culture grew 3 or more types of organisms which indicate  collection contamination; consider recollection only if clinically  indicated.      .Blood  04-28  No growth to date.

## 2019-05-03 NOTE — CHART NOTE - NSCHARTNOTEFT_GEN_A_CORE
Message left with potential meeting times with LT Partner MsSamuel Michelle  Awaiting call back.  Parkinson's is not very advanced  Unlikely to be a hospice candidate given functional status

## 2019-05-03 NOTE — PROGRESS NOTE ADULT - ASSESSMENT
79 y/o male PMHx Prostate CA s/p XRT 2010  admitted for hypotension and tachycardia. consult for hematuria and  OSMIN, Had similar episode in the past month. Now Cr 2.36. Irrigated bladder at bed side.      Plan  -Keep núñez in place for now, no need for CBI  -Patient with radiation cystitis, unlikely to find a source of bleeding to fulgurate, as patient is likely oozing diffusely from bladder  -Next step would include intravesical alum instillations, however will continue conservative management at this time  -Would hold off on intervention and monitor for continued improvement in urine color  -Continue antibiotics, hematuria is starting to improve   -Nursing to irrigate Núñez PRN  -Monitor urine color

## 2019-05-03 NOTE — PROGRESS NOTE ADULT - ASSESSMENT
78m with parkinsons, indwelling núñez, radiation cystitis, admitted with   tachycardia, hypotension and sushant--- similar to last admission-- now resolved  leukocytosis-- wbc elevated throughout last admission in sunrise  overall stable ID-wise

## 2019-05-04 DIAGNOSIS — F05 DELIRIUM DUE TO KNOWN PHYSIOLOGICAL CONDITION: ICD-10-CM

## 2019-05-04 DIAGNOSIS — R41.0 DISORIENTATION, UNSPECIFIED: ICD-10-CM

## 2019-05-04 LAB
ANION GAP SERPL CALC-SCNC: 15 MMOL/L — SIGNIFICANT CHANGE UP (ref 5–17)
BUN SERPL-MCNC: 21 MG/DL — SIGNIFICANT CHANGE UP (ref 7–23)
CALCIUM SERPL-MCNC: 9.3 MG/DL — SIGNIFICANT CHANGE UP (ref 8.4–10.5)
CHLORIDE SERPL-SCNC: 104 MMOL/L — SIGNIFICANT CHANGE UP (ref 96–108)
CO2 SERPL-SCNC: 21 MMOL/L — LOW (ref 22–31)
CREAT SERPL-MCNC: 1.77 MG/DL — HIGH (ref 0.5–1.3)
GLUCOSE BLDC GLUCOMTR-MCNC: 143 MG/DL — HIGH (ref 70–99)
GLUCOSE SERPL-MCNC: 98 MG/DL — SIGNIFICANT CHANGE UP (ref 70–99)
HCT VFR BLD CALC: 28.8 % — LOW (ref 39–50)
HGB BLD-MCNC: 8.4 G/DL — LOW (ref 13–17)
MCHC RBC-ENTMCNC: 27.7 PG — SIGNIFICANT CHANGE UP (ref 27–34)
MCHC RBC-ENTMCNC: 29.2 GM/DL — LOW (ref 32–36)
MCV RBC AUTO: 95 FL — SIGNIFICANT CHANGE UP (ref 80–100)
PLATELET # BLD AUTO: 336 K/UL — SIGNIFICANT CHANGE UP (ref 150–400)
POTASSIUM SERPL-MCNC: 4.7 MMOL/L — SIGNIFICANT CHANGE UP (ref 3.5–5.3)
POTASSIUM SERPL-SCNC: 4.7 MMOL/L — SIGNIFICANT CHANGE UP (ref 3.5–5.3)
RBC # BLD: 3.03 M/UL — LOW (ref 4.2–5.8)
RBC # FLD: 15.1 % — HIGH (ref 10.3–14.5)
SODIUM SERPL-SCNC: 140 MMOL/L — SIGNIFICANT CHANGE UP (ref 135–145)
WBC # BLD: 13.04 K/UL — HIGH (ref 3.8–10.5)
WBC # FLD AUTO: 13.04 K/UL — HIGH (ref 3.8–10.5)

## 2019-05-04 PROCEDURE — 99222 1ST HOSP IP/OBS MODERATE 55: CPT

## 2019-05-04 PROCEDURE — 99497 ADVNCD CARE PLAN 30 MIN: CPT

## 2019-05-04 PROCEDURE — 99233 SBSQ HOSP IP/OBS HIGH 50: CPT

## 2019-05-04 PROCEDURE — 99223 1ST HOSP IP/OBS HIGH 75: CPT

## 2019-05-04 RX ORDER — MIDODRINE HYDROCHLORIDE 2.5 MG/1
2.5 TABLET ORAL
Qty: 0 | Refills: 0 | Status: DISCONTINUED | OUTPATIENT
Start: 2019-05-04 | End: 2019-05-06

## 2019-05-04 RX ORDER — OLANZAPINE 15 MG/1
2.5 TABLET, FILM COATED ORAL
Qty: 0 | Refills: 0 | Status: DISCONTINUED | OUTPATIENT
Start: 2019-05-04 | End: 2019-05-11

## 2019-05-04 RX ORDER — CARBIDOPA AND LEVODOPA 25; 100 MG/1; MG/1
2 TABLET ORAL
Qty: 0 | Refills: 0 | Status: DISCONTINUED | OUTPATIENT
Start: 2019-05-04 | End: 2019-05-11

## 2019-05-04 RX ADMIN — Medication 1 APPLICATION(S): at 18:25

## 2019-05-04 RX ADMIN — CARBIDOPA AND LEVODOPA 2 CAPSULE(S): 25; 100 TABLET ORAL at 10:03

## 2019-05-04 RX ADMIN — Medication 100 MILLIGRAM(S): at 18:25

## 2019-05-04 RX ADMIN — LIDOCAINE 1 APPLICATION(S): 4 CREAM TOPICAL at 05:02

## 2019-05-04 RX ADMIN — LUBIPROSTONE 8 MICROGRAM(S): 24 CAPSULE, GELATIN COATED ORAL at 05:02

## 2019-05-04 RX ADMIN — CARBIDOPA AND LEVODOPA 2 CAPSULE(S): 25; 100 TABLET ORAL at 01:50

## 2019-05-04 RX ADMIN — MIDODRINE HYDROCHLORIDE 2.5 MILLIGRAM(S): 2.5 TABLET ORAL at 18:25

## 2019-05-04 RX ADMIN — CARBIDOPA AND LEVODOPA 2 CAPSULE(S): 25; 100 TABLET ORAL at 22:12

## 2019-05-04 RX ADMIN — Medication 1 APPLICATION(S): at 05:02

## 2019-05-04 RX ADMIN — Medication 100 MILLIGRAM(S): at 05:02

## 2019-05-04 RX ADMIN — CEFTRIAXONE 100 GRAM(S): 500 INJECTION, POWDER, FOR SOLUTION INTRAMUSCULAR; INTRAVENOUS at 22:13

## 2019-05-04 RX ADMIN — SIMVASTATIN 10 MILLIGRAM(S): 20 TABLET, FILM COATED ORAL at 22:13

## 2019-05-04 RX ADMIN — LUBIPROSTONE 8 MICROGRAM(S): 24 CAPSULE, GELATIN COATED ORAL at 18:25

## 2019-05-04 RX ADMIN — Medication 1 MILLIGRAM(S): at 12:05

## 2019-05-04 RX ADMIN — OLANZAPINE 2.5 MILLIGRAM(S): 15 TABLET, FILM COATED ORAL at 18:24

## 2019-05-04 RX ADMIN — CARBIDOPA AND LEVODOPA 2 CAPSULE(S): 25; 100 TABLET ORAL at 16:21

## 2019-05-04 RX ADMIN — SERTRALINE 100 MILLIGRAM(S): 25 TABLET, FILM COATED ORAL at 12:05

## 2019-05-04 RX ADMIN — CARBIDOPA AND LEVODOPA 2 CAPSULE(S): 25; 100 TABLET ORAL at 05:03

## 2019-05-04 RX ADMIN — LIDOCAINE 1 APPLICATION(S): 4 CREAM TOPICAL at 18:25

## 2019-05-04 RX ADMIN — Medication 100 MILLIGRAM(S): at 12:05

## 2019-05-04 RX ADMIN — RASAGILINE 1 MILLIGRAM(S): 0.5 TABLET ORAL at 12:05

## 2019-05-04 NOTE — CHART NOTE - NSCHARTNOTEFT_GEN_A_CORE
While rounding, patient noted to be agitated and screaming "police." Patient swinging arms and legs in bed. Unable to redirect with RN and PCAs. Still screaming and swinging around arms and legs trying to get out of bed.     Vital Signs Last 24 Hrs  T(C): 36.9 (03 May 2019 20:52), Max: 36.9 (03 May 2019 20:52)  T(F): 98.4 (03 May 2019 20:52), Max: 98.4 (03 May 2019 20:52)  HR: 71 (03 May 2019 22:32) (71 - 100)  BP: 109/72 (03 May 2019 22:32) (93/49 - 137/82)  BP(mean): --  RR: 18 (03 May 2019 22:32) (17 - 18)  SpO2: 96% (03 May 2019 22:32) (95% - 98%)    Agitation   ativan 0.5 x1   continue enhanced supervision   neuro f/u     will continue to monitor   will endorse to day team     Mary Ramachandran PA-C   [id number]

## 2019-05-04 NOTE — BEHAVIORAL HEALTH ASSESSMENT NOTE - HPI (INCLUDE ILLNESS QUALITY, SEVERITY, DURATION, TIMING, CONTEXT, MODIFYING FACTORS, ASSOCIATED SIGNS AND SYMPTOMS)
This is a 77 y/o  male, retired , domiciled with fiance of 20 years, previously , has adult children with PPHx of former therapy when getting his divorce many years, currently on Zoloft as part of Parkinson's regimen, with complicated PMHx including Parkinson's disease,  HTN, HLD, prostate cancer s/p RT c/b hemorrhagic radiation cystitis, chronic indwelling núñez after admission last month with multiple issues of núñez clogging and retention admitted for hypotension and OSMIN, now being treated for complicated UTI. Psychiatry consulted for agitation, confusion, and psychosis occurring at night consistently for which he has received Ativan 0.5 mg IV on 5/1 night and 5/3 night.     Of note, shortly prior to conducting interview patient had syncopal episode in bathroom thought to be either 2/2 orthostatic hypotension (although vitals not captured) or vasovagal response, as per primary team.     As per patient's fiance at bedside, the patient has had 4 hospitalizations since the end of February, first with aspiration PNA and since then due to complications of his chronic Núñez catheter. In this period patient has been home only a few days, and just prior to admission had been in rehab only for a few days. She believes that his paranoia has been developing over the past two months that he has spent primarily in the hospital but that it has gotten much worse over the past 3 days this admission. At night patient begins screaming and attempting to get out of his bed. He calls fiance and tells her to bring the police. He believes that the medication is poison and that people are trying to poison him here so he tells his wife to come to the hospital and bring the police. Patient has attempted to climb out of hospital bed. Patient often thinks he has been taken somewhere else and is not in the hospital. His fiance states that during the day he is much less agitated but sometimes still seems to be paranoid. She states that during the day he is not confused. Prior to these multiple hospitalization, the fiance states that patient did not have any cognitive decline. Even during some of these hospitalizations he has still been cognitively intact, she states, providing the example of him being on the phone with his  in order to complete his taxes. Of note, the fiance states that the last time he was home for a few days about 3 weeks ago he did have a visual hallucination in which he thought he saw a dog in their home (they do not have a dog) and was telling it to come closer to him so he could pet it.     Upon interview, the patient states that he does not feel safe here. He states, "Some strange things are happening here...there are weird people trying do bad things." He states that "bad things" usually happen around a shift change and that he cannot trust the staff here. Patient is vague and unable to specify what kinds of "bad things." He also states that sometimes people here are talking about him. He says he is "suspicious" of the treatment he is receiving here. Patient states he has been having more vivid dreams with "more action" but then states they are more like "hallucinations" although he maintains that they occur when he is sleeping. Patient unable to recall episodes where he has become agitated. He explains that he is in the hospital because "it all started with aspiration pneumonia" but the reason he is in the hospital at this time is "fuzzy." He says his mood is "down" because he has been told he has a poor prognosis. He denies SI. When asked about HI or harming others, he states that if he could find the people that did certain medical procedures on him, i.e. the person who put his Núñez in or a certain dentist, he would want to "punch them in the face." He denies AH.     Patient states today's date as April 4, 2019. Knows who the president is. Is able to spell WORLD backwards. Is able to calculate serial 7s. He scores 3/3 on delayed 3-item recall. He makes > 2 errors when testing for attention with by being instructed to tap only when he hears A when series of letters are read aloud.

## 2019-05-04 NOTE — BEHAVIORAL HEALTH ASSESSMENT NOTE - CASE SUMMARY
This is a 79 y/o  male, retired , domiciled with fiance of 20 years, previously , has adult children with PPHx of former therapy when getting his divorce many years, currently on Zoloft as part of Parkinson's regimen, with complicated PMHx including Parkinson's disease,  HTN, HLD, prostate cancer s/p RT c/b hemorrhagic radiation cystitis, chronic indwelling núñez after admission last month with multiple issues of núñez clogging and retention admitted for hypotension and OSMIN, now being treated for complicated UTI. Psychiatry consulted for agitation, confusion, and psychosis occurring at night consistently for which he has received Ativan 0.5 mg IV on 5/1 night and 5/3 night.  pt confused, not able to explain details of admisssion, currently calm. collateral obtained from family, rec start zyprexa 2.5mg po q6pm, zyprexa 2.5mg po/im q6hr prn severe agitation, cont enhanced care.

## 2019-05-04 NOTE — CONSULT NOTE ADULT - REASON FOR ADMISSION
tachycardia, hypotension, sushant

## 2019-05-04 NOTE — BEHAVIORAL HEALTH ASSESSMENT NOTE - NSBHCHARTREVIEWINVESTIGATE_PSY_A_CORE FT
Ventricular Rate 74 BPM    Atrial Rate 74 BPM    P-R Interval 184 ms    QRS Duration 68 ms    Q-T Interval 396 ms    QTC Calculation(Bezet) 439 ms    P Axis 30 degrees    R Axis 23 degrees    T Axis 35 degrees    Diagnosis Line NORMAL SINUS RHYTHM  SEPTAL INFARCT , AGE UNDETERMINED  ABNORMAL ECG    Confirmed by ATTENDING, ED ([id number]),  ALESSANDRO SPAIN ([id number]) on 4/29/2019 4:34:45 AM

## 2019-05-04 NOTE — PROGRESS NOTE ADULT - SUBJECTIVE AND OBJECTIVE BOX
Urology Daily Progress Note    SUBJECTIVE:  Pt seen and examined, and is resting comfortably in bed. No acute events overnight.      OBJECTIVE:  Vital Signs Last 24 Hrs  T(C): 36.6 (04 May 2019 11:17), Max: 36.9 (03 May 2019 20:52)  T(F): 97.9 (04 May 2019 11:17), Max: 98.4 (03 May 2019 20:52)  HR: 75 (04 May 2019 11:17) (67 - 100)  BP: 123/76 (04 May 2019 11:17) (93/49 - 136/81)  BP(mean): --  RR: 18 (04 May 2019 11:17) (17 - 18)  SpO2: 97% (04 May 2019 11:17) (95% - 98%)    I&O's Detail    03 May 2019 07:01  -  04 May 2019 07:00  --------------------------------------------------------  IN:    Instillation: 120 mL    Oral Fluid: 325 mL  Total IN: 445 mL    OUT:    Indwelling Catheter - Urethral: 850 mL  Total OUT: 850 mL    Total NET: -405 mL      04 May 2019 07:01  -  04 May 2019 12:42  --------------------------------------------------------  IN:    Oral Fluid: 50 mL  Total IN: 50 mL    OUT:  Total OUT: 0 mL    Total NET: 50 mL        Exam:  GEN: A&Ox3, NAD  HEENT: atraumatic, normocephalic  CV: no JVD  RESP: no increased work of breathing, no use of accessory muscles  GI/ABD: soft, NT, ND  : Arrieta draining clear yellow urine  EXTREMITIES: warm, pink, well-perfused                        8.4    13.04 )-----------( 336      ( 04 May 2019 09:24 )             28.8       05-04    140  |  104  |  21  ----------------------------<  98  4.7   |  21<L>  |  1.77<H>    Ca    9.3      04 May 2019 05:11 Urology Daily Progress Note    SUBJECTIVE:  Pt seen and examined, and is resting comfortably in bed. No acute events overnight.      OBJECTIVE:  Vital Signs Last 24 Hrs  T(C): 36.6 (04 May 2019 11:17), Max: 36.9 (03 May 2019 20:52)  T(F): 97.9 (04 May 2019 11:17), Max: 98.4 (03 May 2019 20:52)  HR: 75 (04 May 2019 11:17) (67 - 100)  BP: 123/76 (04 May 2019 11:17) (93/49 - 136/81)  BP(mean): --  RR: 18 (04 May 2019 11:17) (17 - 18)  SpO2: 97% (04 May 2019 11:17) (95% - 98%)    I&O's Detail    03 May 2019 07:01  -  04 May 2019 07:00  --------------------------------------------------------  IN:    Instillation: 120 mL    Oral Fluid: 325 mL  Total IN: 445 mL    OUT:    Indwelling Catheter - Urethral: 850 mL  Total OUT: 850 mL    Total NET: -405 mL      04 May 2019 07:01  -  04 May 2019 12:42  --------------------------------------------------------  IN:    Oral Fluid: 50 mL  Total IN: 50 mL    OUT:  Total OUT: 0 mL    Total NET: 50 mL        Exam:  GEN: NAD  HEENT: atraumatic, normocephalic  CV: no JVD  RESP: no increased work of breathing, no use of accessory muscles  GI/ABD: soft, NT, ND  : Arrieta draining clear yellow urine  EXTREMITIES: warm, pink, well-perfused                        8.4    13.04 )-----------( 336      ( 04 May 2019 09:24 )             28.8       05-04    140  |  104  |  21  ----------------------------<  98  4.7   |  21<L>  |  1.77<H>    Ca    9.3      04 May 2019 05:11

## 2019-05-04 NOTE — PROGRESS NOTE ADULT - ASSESSMENT
77 y/o male PMHx Prostate CA s/p XRT 2010 admitted for hypotension and tachycardia. consult for hematuria and  OSMIN, Had similar episode in the past month. Now Cr 2.36. Irrigated bladder at bed side.  Hematuria likely 2/2 radiation cystitis --> now resolved      Plan  -Keep Arrieta in place, should be discharged with catheter  -Continue antibiotics for a total of 7 days  -Nursing to irrigate Arrieta PRN  -Will sign off    - Discussed with Dr. Brambila

## 2019-05-04 NOTE — BEHAVIORAL HEALTH ASSESSMENT NOTE - NSBHCHARTREVIEWVS_PSY_A_CORE FT
Vital Signs (24 Hrs):  T(C): 36.6 (05-04-19 @ 11:17), Max: 36.9 (05-03-19 @ 20:52)  HR: 75 (05-04-19 @ 11:17) (67 - 100)  BP: 123/76 (05-04-19 @ 11:17) (93/49 - 136/81)  RR: 18 (05-04-19 @ 11:17) (17 - 18)  SpO2: 97% (05-04-19 @ 11:17) (95% - 98%)  Wt(kg): --  Daily     Daily     I&O's Summary    03 May 2019 07:01  -  04 May 2019 07:00  --------------------------------------------------------  IN: 445 mL / OUT: 850 mL / NET: -405 mL    04 May 2019 07:01  -  04 May 2019 14:27  --------------------------------------------------------  IN: 50 mL / OUT: 0 mL / NET: 50 mL

## 2019-05-04 NOTE — BEHAVIORAL HEALTH ASSESSMENT NOTE - NSBHCHARTREVIEWLAB_PSY_A_CORE FT
8.4    13.04 )-----------( 336      ( 04 May 2019 09:24 )             28.8     05-04    140  |  104  |  21  ----------------------------<  98  4.7   |  21<L>  |  1.77<H>    Ca    9.3      04 May 2019 05:11

## 2019-05-04 NOTE — BEHAVIORAL HEALTH ASSESSMENT NOTE - RISK ASSESSMENT
Risk factors: acute medical issue, psychosis, paranoia, delirium, threats to others, frailty     Protective factors: no current SIIP/HIIP, no h/o SA/SIB, no h/o psych admissions, no access to weapons, no active substance abuse, domiciled,  social supports    Overall, pt is a moderate risk of harm to self/others and does not require psychiatric admission for safety and stabilization.

## 2019-05-04 NOTE — BEHAVIORAL HEALTH ASSESSMENT NOTE - NSBHCONSULTRECOMMENDOTHER_PSY_A_CORE FT
Please do not wake patient up in the middle of the night and try to ensure all medications are given throughout the day time.   Frequent reorienation.   Obtain serial EKGs to ensure QTc remains < 500.

## 2019-05-04 NOTE — BEHAVIORAL HEALTH ASSESSMENT NOTE - DESCRIPTION
Parkinson's disease,  HTN, HLD, prostate cancer s/p RT c/b hemorrhagic radiation cystitis, chronic indwelling núñez after admission last month with multiple issues of núñez clogging

## 2019-05-04 NOTE — CONSULT NOTE ADULT - SUBJECTIVE AND OBJECTIVE BOX
Patient was evaluated yesterday afternoon. He has a 16  year hx of P. His progression has lead to motor fluctuations and dyskinesia. He takes rytary 145 2 tabs q4hrs which has mitigated his wearings offs and finds that amantadine controls his dyskinesia. Prior to recent admissions, he was ambulating with an assistive device and doing most ADLs independently     He has a hx of prostate ca s/p RT and c/b cystitis requiring a chronic indwelling núñez. He was admitted from rehab for hypotension and tachycardia ajd treated with abx for complicated UTI. This is his third admission since January form obstructive uropathy and hemorrhagic cystitis. Over the past 2-3 months he has experienced increase orthostatic hypotension. He denies any changes to his swallowing. He does not feel stiff nor particularly limited in fine motor movements. During admission, he has had a few bouts of overnight delirium    On exam: He has mild masking. Rapid movements are mild-moderately bradykinetic L>R. There is mild cogwheel rigidity in his limbs. Gait was deferred due to fatigue.     Labs: h/h: 8/26.2 BUN/Cr: 21/1.77

## 2019-05-04 NOTE — STROKE CODE NOTE - DISPOSITION
Patient with syncopal episode, no focal neurologic deficits, currently back to baseline. Primary team notified to call back if necessary for additional recommendations/Other

## 2019-05-04 NOTE — CHART NOTE - NSCHARTNOTEFT_GEN_A_CORE
Episode of unresponsiveness while in bathroom. pt was brought into bathroom by PCA. Pt became unresponsive as sat down on toilet for about 3-4 min as per PCA. As this NP saw this pt in the bathroom, pt was drooling, opening eyes, verbally responsive with slurred speech. Code stroke called. Pt placed in bed. Became A & O X3, followed commands, speech improved, no focal deficit noted. . Vital signs stable. No recorded tele episode aS Tele monitoring leads were off while in bathroom. Code stroke cancelled. pt back to baseline. Likely orthostatic hypotension. Dr. moore aware of episode and wanted to start Midodrine 2.5 mg BID.  Midodrine started.     Idalmis Vail NP-C  #[id number]

## 2019-05-04 NOTE — BEHAVIORAL HEALTH ASSESSMENT NOTE - SUMMARY
This is a 77 y/o  male, retired , domiciled with kailey of 20 years, previously , has adult children with PPHx of former therapy when getting his divorce many years, currently on Zoloft as part of Parkinson's regimen, with complicated PMHx including Parkinson's disease,  HTN, HLD, prostate cancer s/p RT c/b hemorrhagic radiation cystitis, chronic indwelling núñez after admission last month with multiple issues of núñez clogging and retention admitted for hypotension and OSMIN, now being treated for complicated UTI. Psychiatry consulted for agitation, confusion, and psychosis occurring at night consistently for which he has received Ativan 0.5 mg IV on 5/1 night and 5/3 night. As per collateral from kailey, patient has been agitated, paranoid, delusional, and confused during the nighttime and during the day has been much less confused but still with residual paranoia, which is apparent on interview day. On re-interview, however, patient much less paranoid. On interview today, orientation is intact and patient alert enough to complete components of mini-mental status exam correctly.  All this is most likely secondary to delirium but given his hx of Parkinson's disease and possible visual hallucination 3 weeks ago reported by kailey Parkinson's associated psychosis could be a possibility. Treatment would be the same regardless. Given how his thoughts and confusion are detrimental to his medical treatment, he will benefit from medication. Zyprexa is the best choice for his psychosis due to decreased EPS burden in someone with Parkinson's and no blood pressure lowering effects.

## 2019-05-04 NOTE — BEHAVIORAL HEALTH ASSESSMENT NOTE - AXIS III
Parkinson's disease, HTN, HLD, prostate cancer s/p RT c/b hemorrhagic radiation cystitis, chronic indwelling núñez after admission last month with multiple issues of núñez clogging and retention

## 2019-05-04 NOTE — PROGRESS NOTE ADULT - SUBJECTIVE AND OBJECTIVE BOX
Today is the last day of antibx  urology continues to follow for the blood clot irrigation/hematuria  pt claims he and wife spoke with Dr. Thrasher yesterday    Vital Signs Last 24 Hrs  T(C): 36.7 (04 May 2019 04:05), Max: 36.9 (03 May 2019 20:52)  T(F): 98 (04 May 2019 04:05), Max: 98.4 (03 May 2019 20:52)  HR: 67 (04 May 2019 04:05) (67 - 100)  BP: 136/81 (04 May 2019 04:05) (93/49 - 136/81)  BP(mean): --  RR: 18 (04 May 2019 04:05) (17 - 18)  SpO2: 97% (04 May 2019 04:05) (95% - 98%)    GENERAL: NAD, chronically ill appearing caucausian man  HEAD:  Atraumatic, Normocephalic  EYES: EOMI, PERRLA, conjunctiva and sclera clear  ENMT: No tonsillar erythema, exudates, or enlargement; Moist mucous membranes, Good dentition, No lesions  NECK: Supple, No JVD, Normal thyroid  CHEST/LUNG: Clear to percussion bilaterally; No rales, rhonchi, wheezing, or rubs no resp distress or accessory muscle usage  HEART: Regular rate and rhythm; No murmurs, rubs, or gallops, trace edema b/l LE.   ABDOMEN: Soft, Nontender, Nondistended; Bowel sounds present. +mild suprapubic tenderness. no rebound/guarding.   EXTREMITIES:  2+ Peripheral Pulses, No clubbing, cyanosis, rom intact  LYMPH: No lymphadenopathy noted, no lymphangitis  SKIN: No rashes or lesions, no petechiae  NERVOUS SYSTEM:  Alert & Oriented X2-3, Good concentration; Motor Strength 5/5 B/L upper and lower extremities  PSYCH: flat affect, no delirium      LABS:                        8.0    14.03 )-----------( 571      ( 03 May 2019 07:36 )             26.2     05-04    140  |  104  |  21  ----------------------------<  98  4.7   |  21<L>  |  1.77<H>    Ca    9.3      04 May 2019 05:11        CAPILLARY BLOOD GLUCOSE      POCT Blood Glucose.: 103 mg/dL (03 May 2019 22:21)

## 2019-05-04 NOTE — BEHAVIORAL HEALTH ASSESSMENT NOTE - DETAILS
see HPI; not homicidal, only aggressive towards certain medical providers chronic indwelling Arrieta catheter physical deconditioning dyskinesia

## 2019-05-04 NOTE — BEHAVIORAL HEALTH ASSESSMENT NOTE - OTHER
kailey exacerbation of medical issues unable to assess dyskinesia particularly of left lower extremity unable to assess as patient bedbound

## 2019-05-04 NOTE — CONSULT NOTE ADULT - ASSESSMENT
A/P: Parkinson;s disease who is deconditioned from multiple hospitalizations and underlying medical problems including recent complicated UTI and anemia. His orthostatic hypotension may be related to underlying autonomic dysfunction but cannot exclude contribution of anemia, JACKIE and ?cardiac problem as factors.     Recommendations  Would change rytary 145 regimen back to his home regimen of 2 tabs @ 8A/12P/4P/8P and bedtime  Cont amantadine 100mg TID  cont rasagaline    Monitor BPs closely. Management of Jackie and volemic status per primary team. May need to consider florinef or midodrine if OH does not improve    Needs PT and increase mobilization    Avoid use of haldol or other dopamine blocking agents. Low dose seroquel 12.5mg-25mg can be used for overnight agitation.     I spoke to patient and wife at length about his condition and the impact of his various medical issues on his motor symptoms. I look forward to seeing him after he is discharged. Please call with any questions or concerns.

## 2019-05-05 DIAGNOSIS — R55 SYNCOPE AND COLLAPSE: ICD-10-CM

## 2019-05-05 LAB
ANION GAP SERPL CALC-SCNC: 12 MMOL/L — SIGNIFICANT CHANGE UP (ref 5–17)
ANISOCYTOSIS BLD QL: SLIGHT — SIGNIFICANT CHANGE UP
BASOPHILS # BLD AUTO: 0.13 K/UL — SIGNIFICANT CHANGE UP (ref 0–0.2)
BASOPHILS NFR BLD AUTO: 1.4 % — SIGNIFICANT CHANGE UP (ref 0–2)
BUN SERPL-MCNC: 20 MG/DL — SIGNIFICANT CHANGE UP (ref 7–23)
CALCIUM SERPL-MCNC: 9.2 MG/DL — SIGNIFICANT CHANGE UP (ref 8.4–10.5)
CHLORIDE SERPL-SCNC: 107 MMOL/L — SIGNIFICANT CHANGE UP (ref 96–108)
CO2 SERPL-SCNC: 24 MMOL/L — SIGNIFICANT CHANGE UP (ref 22–31)
CREAT SERPL-MCNC: 1.29 MG/DL — SIGNIFICANT CHANGE UP (ref 0.5–1.3)
EOSINOPHIL # BLD AUTO: 0.96 K/UL — HIGH (ref 0–0.5)
EOSINOPHIL NFR BLD AUTO: 10 % — HIGH (ref 0–6)
GLUCOSE SERPL-MCNC: 118 MG/DL — HIGH (ref 70–99)
HCT VFR BLD CALC: 27.7 % — LOW (ref 39–50)
HGB BLD-MCNC: 8.2 G/DL — LOW (ref 13–17)
HYPOCHROMIA BLD QL: SLIGHT — SIGNIFICANT CHANGE UP
IMM GRANULOCYTES NFR BLD AUTO: 1 % — SIGNIFICANT CHANGE UP (ref 0–1.5)
LYMPHOCYTES # BLD AUTO: 2.93 K/UL — SIGNIFICANT CHANGE UP (ref 1–3.3)
LYMPHOCYTES # BLD AUTO: 30.6 % — SIGNIFICANT CHANGE UP (ref 13–44)
MANUAL SMEAR VERIFICATION: SIGNIFICANT CHANGE UP
MCHC RBC-ENTMCNC: 28 PG — SIGNIFICANT CHANGE UP (ref 27–34)
MCHC RBC-ENTMCNC: 29.6 GM/DL — LOW (ref 32–36)
MCV RBC AUTO: 94.5 FL — SIGNIFICANT CHANGE UP (ref 80–100)
MONOCYTES # BLD AUTO: 0.66 K/UL — SIGNIFICANT CHANGE UP (ref 0–0.9)
MONOCYTES NFR BLD AUTO: 6.9 % — SIGNIFICANT CHANGE UP (ref 2–14)
NEUTROPHILS # BLD AUTO: 4.8 K/UL — SIGNIFICANT CHANGE UP (ref 1.8–7.4)
NEUTROPHILS NFR BLD AUTO: 50.1 % — SIGNIFICANT CHANGE UP (ref 43–77)
PLAT MORPH BLD: NORMAL — SIGNIFICANT CHANGE UP
PLATELET # BLD AUTO: 592 K/UL — HIGH (ref 150–400)
POTASSIUM SERPL-MCNC: 3.9 MMOL/L — SIGNIFICANT CHANGE UP (ref 3.5–5.3)
POTASSIUM SERPL-SCNC: 3.9 MMOL/L — SIGNIFICANT CHANGE UP (ref 3.5–5.3)
RBC # BLD: 2.93 M/UL — LOW (ref 4.2–5.8)
RBC # FLD: 15.2 % — HIGH (ref 10.3–14.5)
RBC BLD AUTO: ABNORMAL
SODIUM SERPL-SCNC: 143 MMOL/L — SIGNIFICANT CHANGE UP (ref 135–145)
WBC # BLD: 9.58 K/UL — SIGNIFICANT CHANGE UP (ref 3.8–10.5)
WBC # FLD AUTO: 9.58 K/UL — SIGNIFICANT CHANGE UP (ref 3.8–10.5)

## 2019-05-05 PROCEDURE — 99232 SBSQ HOSP IP/OBS MODERATE 35: CPT

## 2019-05-05 RX ADMIN — Medication 1 MILLIGRAM(S): at 11:56

## 2019-05-05 RX ADMIN — LUBIPROSTONE 8 MICROGRAM(S): 24 CAPSULE, GELATIN COATED ORAL at 18:08

## 2019-05-05 RX ADMIN — LIDOCAINE 1 APPLICATION(S): 4 CREAM TOPICAL at 18:08

## 2019-05-05 RX ADMIN — Medication 100 MILLIGRAM(S): at 18:08

## 2019-05-05 RX ADMIN — Medication 1 APPLICATION(S): at 18:09

## 2019-05-05 RX ADMIN — MIDODRINE HYDROCHLORIDE 2.5 MILLIGRAM(S): 2.5 TABLET ORAL at 18:08

## 2019-05-05 RX ADMIN — LUBIPROSTONE 8 MICROGRAM(S): 24 CAPSULE, GELATIN COATED ORAL at 06:23

## 2019-05-05 RX ADMIN — Medication 100 MILLIGRAM(S): at 18:09

## 2019-05-05 RX ADMIN — Medication 100 MILLIGRAM(S): at 06:24

## 2019-05-05 RX ADMIN — CARBIDOPA AND LEVODOPA 2 CAPSULE(S): 25; 100 TABLET ORAL at 12:01

## 2019-05-05 RX ADMIN — SERTRALINE 100 MILLIGRAM(S): 25 TABLET, FILM COATED ORAL at 11:56

## 2019-05-05 RX ADMIN — SIMVASTATIN 10 MILLIGRAM(S): 20 TABLET, FILM COATED ORAL at 22:09

## 2019-05-05 RX ADMIN — RASAGILINE 1 MILLIGRAM(S): 0.5 TABLET ORAL at 11:57

## 2019-05-05 RX ADMIN — CARBIDOPA AND LEVODOPA 2 CAPSULE(S): 25; 100 TABLET ORAL at 17:25

## 2019-05-05 RX ADMIN — MIDODRINE HYDROCHLORIDE 2.5 MILLIGRAM(S): 2.5 TABLET ORAL at 08:48

## 2019-05-05 RX ADMIN — CARBIDOPA AND LEVODOPA 2 CAPSULE(S): 25; 100 TABLET ORAL at 08:48

## 2019-05-05 RX ADMIN — Medication 100 MILLIGRAM(S): at 11:55

## 2019-05-05 RX ADMIN — CARBIDOPA AND LEVODOPA 2 CAPSULE(S): 25; 100 TABLET ORAL at 22:09

## 2019-05-05 RX ADMIN — Medication 1 APPLICATION(S): at 06:23

## 2019-05-05 RX ADMIN — OLANZAPINE 2.5 MILLIGRAM(S): 15 TABLET, FILM COATED ORAL at 18:08

## 2019-05-05 RX ADMIN — LIDOCAINE 1 APPLICATION(S): 4 CREAM TOPICAL at 06:23

## 2019-05-05 NOTE — PROGRESS NOTE BEHAVIORAL HEALTH - NSBHCHARTREVIEWINVESTIGATE_PSY_A_CORE FT
Ventricular Rate 74 BPM    Atrial Rate 74 BPM    P-R Interval 184 ms    QRS Duration 68 ms    Q-T Interval 396 ms    QTC Calculation(Bezet) 439 ms    P Axis 30 degrees    R Axis 23 degrees    T Axis 35 degrees    Diagnosis Line NORMAL SINUS RHYTHM  SEPTAL INFARCT , AGE UNDETERMINED  ABNORMAL ECG    Confirmed by ATTENDING, ED ([id number]),  ALESSANDRO SPAIN ([id number]) on 4/29/2019 4:34:45 AM

## 2019-05-05 NOTE — PROGRESS NOTE BEHAVIORAL HEALTH - NSBHCHARTREVIEWVS_PSY_A_CORE FT
Vital Signs Last 24 Hrs  T(C): 36.4 (05 May 2019 05:28), Max: 36.6 (04 May 2019 11:17)  T(F): 97.6 (05 May 2019 05:28), Max: 97.9 (04 May 2019 11:17)  HR: 69 (05 May 2019 05:28) (65 - 75)  BP: 129/79 (05 May 2019 05:28) (123/76 - 137/83)  BP(mean): --  RR: 18 (05 May 2019 05:28) (18 - 18)  SpO2: 98% (05 May 2019 05:28) (97% - 98%)

## 2019-05-05 NOTE — PROGRESS NOTE BEHAVIORAL HEALTH - NSBHFUPINTERVALHXFT_PSY_A_CORE
Pt states he feels ok, no complaints. Pt aware he was admitted for pneumonia, couldn't explain recent events. Pt denies paranoia, hallucinations. no si/hi. no prns overnight. pt states he slept well last night.

## 2019-05-05 NOTE — PROGRESS NOTE ADULT - SUBJECTIVE AND OBJECTIVE BOX
Calm this morning; no acute SOB or CP; had syncopal event last evening likely attributable autonomic dysfunction    Vital Signs Last 24 Hrs  T(C): 36.4 (05 May 2019 05:28), Max: 36.6 (04 May 2019 11:17)  T(F): 97.6 (05 May 2019 05:28), Max: 97.9 (04 May 2019 11:17)  HR: 69 (05 May 2019 05:28) (65 - 75)  BP: 129/79 (05 May 2019 05:28) (123/76 - 137/83)  BP(mean): --  RR: 18 (05 May 2019 05:28) (18 - 18)  SpO2: 98% (05 May 2019 05:28) (97% - 98%)    GENERAL: NAD, chronically ill appearing   HEAD:  Atraumatic, Normocephalic  EYES: EOMI, PERRLA, conjunctiva and sclera clear  NECK: Supple, No JVD, Normal thyroid  CHEST/LUNG: Clear to percussion bilaterally; No rales, rhonchi, wheezing, or rubs no resp distress or accessory muscle usage  HEART: Regular rate and rhythm; No murmurs, rubs, or gallops, trace edema b/l LE.   ABDOMEN: Soft, Nontender, Nondistended; Bowel sounds present. +mild suprapubic tenderness. no rebound/guarding.   EXTREMITIES:  2+ Peripheral Pulses, No clubbing, cyanosis, rom intact  LYMPH: No lymphadenopathy noted, no lymphangitis  SKIN: No rashes or lesions, no petechiae  NERVOUS SYSTEM:  Alert & Oriented X2-3, Good concentration; Motor Strength 5/5 B/L upper and lower extremities  PSYCH: flat affect, no delirium    LABS:                        8.4    13.04 )-----------( 336      ( 04 May 2019 09:24 )             28.8     05-04    140  |  104  |  21  ----------------------------<  98  4.7   |  21<L>  |  1.77<H>    Ca    9.3      04 May 2019 05:11        CAPILLARY BLOOD GLUCOSE      POCT Blood Glucose.: 143 mg/dL (04 May 2019 12:28)

## 2019-05-06 LAB
ANION GAP SERPL CALC-SCNC: 10 MMOL/L — SIGNIFICANT CHANGE UP (ref 5–17)
BLD GP AB SCN SERPL QL: NEGATIVE — SIGNIFICANT CHANGE UP
BUN SERPL-MCNC: 19 MG/DL — SIGNIFICANT CHANGE UP (ref 7–23)
CALCIUM SERPL-MCNC: 8.9 MG/DL — SIGNIFICANT CHANGE UP (ref 8.4–10.5)
CHLORIDE SERPL-SCNC: 106 MMOL/L — SIGNIFICANT CHANGE UP (ref 96–108)
CO2 SERPL-SCNC: 25 MMOL/L — SIGNIFICANT CHANGE UP (ref 22–31)
CREAT SERPL-MCNC: 1.28 MG/DL — SIGNIFICANT CHANGE UP (ref 0.5–1.3)
GLUCOSE SERPL-MCNC: 104 MG/DL — HIGH (ref 70–99)
HCT VFR BLD CALC: 25.9 % — LOW (ref 39–50)
HGB BLD-MCNC: 7.7 G/DL — LOW (ref 13–17)
MCHC RBC-ENTMCNC: 28.1 PG — SIGNIFICANT CHANGE UP (ref 27–34)
MCHC RBC-ENTMCNC: 29.7 GM/DL — LOW (ref 32–36)
MCV RBC AUTO: 94.5 FL — SIGNIFICANT CHANGE UP (ref 80–100)
PLATELET # BLD AUTO: 564 K/UL — HIGH (ref 150–400)
POTASSIUM SERPL-MCNC: 3.9 MMOL/L — SIGNIFICANT CHANGE UP (ref 3.5–5.3)
POTASSIUM SERPL-SCNC: 3.9 MMOL/L — SIGNIFICANT CHANGE UP (ref 3.5–5.3)
RBC # BLD: 2.74 M/UL — LOW (ref 4.2–5.8)
RBC # FLD: 15.5 % — HIGH (ref 10.3–14.5)
RH IG SCN BLD-IMP: POSITIVE — SIGNIFICANT CHANGE UP
SODIUM SERPL-SCNC: 141 MMOL/L — SIGNIFICANT CHANGE UP (ref 135–145)
WBC # BLD: 10.4 K/UL — SIGNIFICANT CHANGE UP (ref 3.8–10.5)
WBC # FLD AUTO: 10.4 K/UL — SIGNIFICANT CHANGE UP (ref 3.8–10.5)

## 2019-05-06 PROCEDURE — 99232 SBSQ HOSP IP/OBS MODERATE 35: CPT

## 2019-05-06 RX ORDER — MIDODRINE HYDROCHLORIDE 2.5 MG/1
2.5 TABLET ORAL ONCE
Qty: 0 | Refills: 0 | Status: COMPLETED | OUTPATIENT
Start: 2019-05-06 | End: 2019-05-06

## 2019-05-06 RX ORDER — MIDODRINE HYDROCHLORIDE 2.5 MG/1
5 TABLET ORAL
Qty: 0 | Refills: 0 | Status: DISCONTINUED | OUTPATIENT
Start: 2019-05-06 | End: 2019-05-07

## 2019-05-06 RX ORDER — SODIUM CHLORIDE 9 MG/ML
500 INJECTION INTRAMUSCULAR; INTRAVENOUS; SUBCUTANEOUS ONCE
Qty: 0 | Refills: 0 | Status: COMPLETED | OUTPATIENT
Start: 2019-05-06 | End: 2019-05-06

## 2019-05-06 RX ADMIN — Medication 100 MILLIGRAM(S): at 06:25

## 2019-05-06 RX ADMIN — CARBIDOPA AND LEVODOPA 2 CAPSULE(S): 25; 100 TABLET ORAL at 08:42

## 2019-05-06 RX ADMIN — Medication 1 MILLIGRAM(S): at 12:51

## 2019-05-06 RX ADMIN — SODIUM CHLORIDE 1500 MILLILITER(S): 9 INJECTION INTRAMUSCULAR; INTRAVENOUS; SUBCUTANEOUS at 12:04

## 2019-05-06 RX ADMIN — Medication 100 MILLIGRAM(S): at 12:51

## 2019-05-06 RX ADMIN — LIDOCAINE 1 APPLICATION(S): 4 CREAM TOPICAL at 17:04

## 2019-05-06 RX ADMIN — OLANZAPINE 2.5 MILLIGRAM(S): 15 TABLET, FILM COATED ORAL at 17:04

## 2019-05-06 RX ADMIN — LUBIPROSTONE 8 MICROGRAM(S): 24 CAPSULE, GELATIN COATED ORAL at 06:25

## 2019-05-06 RX ADMIN — LUBIPROSTONE 8 MICROGRAM(S): 24 CAPSULE, GELATIN COATED ORAL at 17:03

## 2019-05-06 RX ADMIN — Medication 1 APPLICATION(S): at 17:03

## 2019-05-06 RX ADMIN — Medication 100 MILLIGRAM(S): at 17:04

## 2019-05-06 RX ADMIN — SERTRALINE 100 MILLIGRAM(S): 25 TABLET, FILM COATED ORAL at 12:51

## 2019-05-06 RX ADMIN — SIMVASTATIN 10 MILLIGRAM(S): 20 TABLET, FILM COATED ORAL at 21:34

## 2019-05-06 RX ADMIN — MIDODRINE HYDROCHLORIDE 2.5 MILLIGRAM(S): 2.5 TABLET ORAL at 12:51

## 2019-05-06 RX ADMIN — CARBIDOPA AND LEVODOPA 2 CAPSULE(S): 25; 100 TABLET ORAL at 12:52

## 2019-05-06 RX ADMIN — CARBIDOPA AND LEVODOPA 2 CAPSULE(S): 25; 100 TABLET ORAL at 17:03

## 2019-05-06 RX ADMIN — MIDODRINE HYDROCHLORIDE 5 MILLIGRAM(S): 2.5 TABLET ORAL at 17:03

## 2019-05-06 RX ADMIN — CARBIDOPA AND LEVODOPA 2 CAPSULE(S): 25; 100 TABLET ORAL at 21:35

## 2019-05-06 RX ADMIN — Medication 1 APPLICATION(S): at 06:25

## 2019-05-06 RX ADMIN — RASAGILINE 1 MILLIGRAM(S): 0.5 TABLET ORAL at 12:51

## 2019-05-06 RX ADMIN — LIDOCAINE 1 APPLICATION(S): 4 CREAM TOPICAL at 06:26

## 2019-05-06 RX ADMIN — MIDODRINE HYDROCHLORIDE 2.5 MILLIGRAM(S): 2.5 TABLET ORAL at 08:41

## 2019-05-06 NOTE — DIETITIAN INITIAL EVALUATION ADULT. - ORAL INTAKE PTA
Pt reports fair PO intake, recently hospitalized <1 month ago, presents from rehab with hypotension and tachycardia. Pt unable to recall how he was eating at rehab. During most recent hospitalization pt with varying PO intake, completing % of meals. Pt denies chewing/swallowing difficulty, nausea, vomiting, diarrhea, constipation. Confirms NKFA./fair

## 2019-05-06 NOTE — CHART NOTE - NSCHARTNOTEFT_GEN_A_CORE
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 (last change was 4/22/19).    [id number]

## 2019-05-06 NOTE — PROGRESS NOTE BEHAVIORAL HEALTH - NSBHCHARTREVIEWVS_PSY_A_CORE FT
T(C): 36.4 (05-06-19 @ 05:21), Max: 36.4 (05-05-19 @ 21:06)  HR: 72 (05-06-19 @ 08:10) (60 - 84)  BP: 122/80 (05-06-19 @ 08:10) (104/64 - 122/80)  RR: 18 (05-06-19 @ 05:21) (18 - 18)  SpO2: 97% (05-06-19 @ 05:21) (95% - 97%)  Wt(kg): --

## 2019-05-06 NOTE — PROGRESS NOTE ADULT - SUBJECTIVE AND OBJECTIVE BOX
Syncopized last night as he was being assisted to standing position  Midodrine increased to 5mg tid  Getting IVF bolus curently    Vital Signs Last 24 Hrs  T(C): 36.3 (06 May 2019 11:00), Max: 36.4 (05 May 2019 21:06)  T(F): 97.4 (06 May 2019 11:00), Max: 97.6 (05 May 2019 21:06)  HR: 62 (06 May 2019 11:00) (62 - 84)  BP: 95/59 (06 May 2019 11:00) (95/59 - 122/80)  BP(mean): --  RR: 18 (06 May 2019 11:00) (18 - 18)  SpO2: 96% (06 May 2019 11:00) (96% - 97%)    GENERAL: NAD, chronically ill appearing   HEAD:  Atraumatic, Normocephalic  EYES: EOMI, PERRLA, conjunctiva and sclera clear  NECK: Supple, No JVD, Normal thyroid  CHEST/LUNG: Clear to percussion bilaterally; No rales, rhonchi, wheezing, or rubs no resp distress or accessory muscle usage  HEART: Regular rate and rhythm; No murmurs, rubs, or gallops, trace edema b/l LE.   ABDOMEN: Soft, Nontender, Nondistended; Bowel sounds present. +mild suprapubic tenderness. no rebound/guarding.   EXTREMITIES:  2+ Peripheral Pulses, No clubbing, cyanosis, rom intact  LYMPH: No lymphadenopathy noted, no lymphangitis  SKIN: No rashes or lesions, no petechiae  NERVOUS SYSTEM:  Alert & Oriented X2-3, Good concentration; Motor Strength 5/5 B/L upper and lower extremities  PSYCH: flat affect, no delirium    LABS:                        7.7    10.40 )-----------( 564      ( 06 May 2019 15:07 )             25.9     05-06    141  |  106  |  19  ----------------------------<  104<H>  3.9   |  25  |  1.28    Ca    8.9      06 May 2019 10:55        CAPILLARY BLOOD GLUCOSE

## 2019-05-06 NOTE — PROGRESS NOTE BEHAVIORAL HEALTH - AXIS III
Parkinson's disease, HTN, HLD, prostate cancer s/p RT c/b hemorrhagic radiation cystitis, chronic indwelling núñez after admission last month with multiple issues of núñez clogging and retention
Parkinson's disease, HTN, HLD, prostate cancer s/p RT c/b hemorrhagic radiation cystitis, chronic indwelling núñez after admission last month with multiple issues of núñez clogging and retention

## 2019-05-06 NOTE — PROGRESS NOTE BEHAVIORAL HEALTH - NSBHCONSULTMEDS_PSY_A_CORE FT
1) continue zyprexa 2.5 mg to be given at 6 PM qhs; can d/c prior to d/c from hospital  2) continue zoloft 100 mg qdaily for depression/anxiety
zyprexa 2.5 mg to be given at 6 PM qhs

## 2019-05-06 NOTE — DIETITIAN INITIAL EVALUATION ADULT. - PHYSICAL APPEARANCE
Nutrition focused physical exam deferred at this time given pt consuming lunch, pt noted with mild muscle wasting of temples.

## 2019-05-06 NOTE — PROGRESS NOTE BEHAVIORAL HEALTH - NSBHCHARTREVIEWLAB_PSY_A_CORE FT
8.2    9.58  )-----------( 592      ( 05 May 2019 13:49 )             27.7   05-05    143  |  107  |  20  ----------------------------<  118<H>  3.9   |  24  |  1.29    Ca    9.2      05 May 2019 10:33

## 2019-05-06 NOTE — PROGRESS NOTE ADULT - ASSESSMENT
78m with parkinsons, indwelling núñez, radiation cystitis, admitted with   tachycardia, hypotension and sushant--- similar to last admission-- now resolved  leukocytosis-- resolved  overall stable ID-wise

## 2019-05-06 NOTE — PROGRESS NOTE BEHAVIORAL HEALTH - SUMMARY
This is a 79 y/o  male, retired , domiciled with fiance of 20 years, previously , has adult children with PPHx of former therapy when getting his divorce many years, currently on Zoloft as part of Parkinson's regimen, with complicated PMHx including Parkinson's disease,  HTN, HLD, prostate cancer s/p RT c/b hemorrhagic radiation cystitis, chronic indwelling núñez after admission last month with multiple issues of núñez clogging and retention admitted for hypotension and OSMIN, now being treated for complicated UTI. Psychiatry consulted for agitation, confusion, and psychosis occurring at night consistently for which he has received Ativan 0.5 mg IV on 5/1 night and 5/3 night. As per collateral from [facility], patient has been agitated, paranoid, delusional, and confused during the nighttime and during the day has been much less confused but still with residual paranoia. pt started on zyprexa
This is a 77 y/o  male, retired , domiciled with fiance of 20 years, previously , has adult children with PPHx of former therapy when getting his divorce many years, currently on Zoloft as part of Parkinson's regimen, with complicated PMHx including Parkinson's disease,  HTN, HLD, prostate cancer s/p RT c/b hemorrhagic radiation cystitis, chronic indwelling núñez after admission last month with multiple issues of núñez clogging and retention admitted for hypotension and OSMIN, now being treated for complicated UTI. Psychiatry consulted for agitation, confusion, and psychosis occurring at night consistently for which he has received Ativan 0.5 mg IV on 5/1 night and 5/3 night. As per collateral from [facility], patient has been agitated, paranoid, delusional, and confused during the nighttime and during the day has been much less confused but still with residual paranoia. pt started on zyprexa

## 2019-05-06 NOTE — PROGRESS NOTE BEHAVIORAL HEALTH - NSBHCONSULTRECOMMENDOTHER_PSY_A_CORE FT
Please do not wake patient up in the middle of the night and try to ensure all medications are given throughout the day time.   Frequent reorienation.   Obtain serial EKGs to ensure QTc remains < 500.
Please do not wake patient up in the middle of the night and try to ensure all medications are given throughout the day time.   Frequent reorienation.   Obtain serial EKGs to ensure QTc remains < 500.

## 2019-05-06 NOTE — PROGRESS NOTE ADULT - SUBJECTIVE AND OBJECTIVE BOX
[facility], Division of Infectious Diseases  GARTH Jasmine A. Lee  [phone]    Name: TANK VARGAS  Age: 78y  Gender: Male  MRN: [id number]    Interval History--  Notes reviewed  pt lethargic-- nonverbal  wife states, his bp is low and fell in bathroom  wife also states he had a choking episode over weekend    Past Medical History--  Gout  Prostate cancer  HLD (hyperlipidemia)  HTN (hypertension)  Depression  Parkinson's disease  S/P TURP      For details regarding the patient's social history, family history, and other miscellaneous elements, please refer the initial infectious diseases consultation and/or the admitting history and physical examination for this admission.    Allergies    No Known Allergies    Intolerances        Medications--  Antibiotics:    Immunologic:    Other:  acetaminophen   Tablet .. PRN  allopurinol  amantadine  carbidopa 36.25 mG/levodopa 145 mG ER  docusate sodium  erythromycin   Ointment  folic acid  lidocaine 2% Gel  lubiprostone  midodrine  midodrine  OLANZapine  polyethylene glycol 3350 PRN  rasagiline Tablet  sertraline  simvastatin      Review of Systems--  A 10-point review of systems was obtained.     nonverbal    Review of systems otherwise negative except as previously noted.    Physical Examination--  Vital Signs: T(F): 97.4 (05-06-19 @ 11:00), Max: 97.6 (05-05-19 @ 21:06)  HR: 62 (05-06-19 @ 11:00)  BP: 95/59 (05-06-19 @ 11:00)  RR: 18 (05-06-19 @ 11:00)  SpO2: 96% (05-06-19 @ 11:00)  Wt(kg): --  General: Nontoxic-appearing Male in no acute distress.  HEENT: AT/NC. Anicteric. Conjunctiva pink and moist. Oropharynx clear. Dentition fair.  Neck: Not rigid. No sense of mass.  Nodes: None palpable.  Lungs: Clear bilaterally without rales, wheezing or rhonchi  Heart: distant heart sounds  Abdomen: Bowel sounds present and normoactive. Soft. Nondistended. Nontender.   Extremities: No cyanosis or clubbing. No edema.  gu núñez  Skin: Warm. Dry. Good turgor. No rash. No vasculitic stigmata.  Psychiatric: lethargic        Laboratory Studies--  CBC                        8.2    9.58  )-----------( 592      ( 05 May 2019 13:49 )             27.7       Chemistries  05-06    141  |  106  |  19  ----------------------------<  104<H>  3.9   |  25  |  1.28    Ca    8.9      06 May 2019 10:55        Culture Data

## 2019-05-06 NOTE — PROGRESS NOTE BEHAVIORAL HEALTH - RISK ASSESSMENT
Risk factors: acute medical issue, psychosis, paranoia, delirium, threats to others, frailty     Protective factors: no current SIIP/HIIP, no h/o SA/SIB, no h/o psych admissions, no access to weapons, no active substance abuse, domiciled,  social supports    Overall, pt is a moderate risk of harm to self/others and does not require psychiatric admission for safety and stabilization.
Risk factors: acute medical issue, psychosis, paranoia, delirium, threats to others, frailty     Protective factors: no current SIIP/HIIP, no h/o SA/SIB, no h/o psych admissions, no access to weapons, no active substance abuse, domiciled,  social supports    Overall, pt is a moderate risk of harm to self/others and does not require psychiatric admission for safety and stabilization.

## 2019-05-06 NOTE — PROGRESS NOTE BEHAVIORAL HEALTH - NSBHFUPINTERVALHXFT_PSY_A_CORE
Pt seen, AOA x 3 currently, sleep and appetite fair, no si/hi, and admits to mild depression secondary to ongoing medical issues. no behavioral problems reported. no psychotic symptoms present. pt compliant with zyprexa and zoloft, no side effects reported.

## 2019-05-06 NOTE — CHART NOTE - NSCHARTNOTEFT_GEN_A_CORE
Reported orthostatic hypotension - symptomatic by RN  B/P lying - 95/59 and sitting 64/45 - and pt felt dizzy on sitting up and immediately placed on supine in bed with improvement of his symptoms    Plan:  Normal saline 500cc bolus x 1 and no need for maintenance per Dr. moore          Midodrine increased to 5mg BID          Repeat orthostatic in am    [id number]

## 2019-05-06 NOTE — PROGRESS NOTE BEHAVIORAL HEALTH - NSBHCONSULTMEDAGITATION_PSY_A_CORE FT
zyprexa 2.5 mg PO/IM (please attempt PO first) q6H for agitation
zyprexa 2.5 mg PO/IM (please attempt PO first) q6H for agitation

## 2019-05-06 NOTE — DIETITIAN INITIAL EVALUATION ADULT. - OTHER INFO
Patient seen for Length Of Stay on 4MON. Pt reports UBW as ~230lbs x a few months ago, weight per previous RD notes as follows: 3/25/19: 215lbs, 4/22: 220lbs. Current dosing weight noted as 194lbs suggesting 11.8% weight loss x 2 weeks, ? accuracy of weight trend, likely partially related to fluid shifts. Will continue to monitor closely. Since admission pt variable PO intake, completing % of meals. Per PCA, pt eats well during breakfast and tends to have a lighter lunch and dinner. Reports pt consumed 100% of hamburger for dinner last night. Lunch tray at bedside with <50% of meal completed. RD obtained food preferences, will honor as able. Pt drinking Danactive probiotic drink, RD discussed the benefits of probiotic supplementation while on antibiotics (course completed on 5/4). RD obtained food preferences. No acute GI distress noted however last BM on 4/29, bowel regimen noted.

## 2019-05-06 NOTE — DIETITIAN INITIAL EVALUATION ADULT. - ENERGY NEEDS
Ht:70inches, Wt: 194lbs, BMI: 27.8kg/m2, IBW: 166lbs +/- 10%, %IBW: 117%  Edema: +2 right/left arm , Skin: free of pressure injuries per nursing flow sheets   Pertinent Information: 78 M PMH of HTN, HLD, prostate cancer s/p RT c/b hemorrhagic radiation cystitis, chronic indwelling núñez after admission last month with multiple issues of núñez clogging and retention, Parkinson's disease, recent admit for obstructive uropathy 2/2 núñez issues, now presents from rehab with hypotension and tachycardia, f/w hematuria and OSMIN. Pt noted syncopal episode on 5/4. Palliative care following. Psych following for anxiety, depression.

## 2019-05-07 DIAGNOSIS — Z02.9 ENCOUNTER FOR ADMINISTRATIVE EXAMINATIONS, UNSPECIFIED: ICD-10-CM

## 2019-05-07 LAB
BLD GP AB SCN SERPL QL: NEGATIVE — SIGNIFICANT CHANGE UP
HCT VFR BLD CALC: 25.6 % — LOW (ref 39–50)
HGB BLD-MCNC: 8.3 G/DL — LOW (ref 13–17)
MCHC RBC-ENTMCNC: 29.6 PG — SIGNIFICANT CHANGE UP (ref 27–34)
MCHC RBC-ENTMCNC: 32.5 GM/DL — SIGNIFICANT CHANGE UP (ref 32–36)
MCV RBC AUTO: 90.9 FL — SIGNIFICANT CHANGE UP (ref 80–100)
PLATELET # BLD AUTO: 546 K/UL — HIGH (ref 150–400)
RBC # BLD: 2.82 M/UL — LOW (ref 4.2–5.8)
RBC # FLD: 14.4 % — SIGNIFICANT CHANGE UP (ref 10.3–14.5)
RH IG SCN BLD-IMP: POSITIVE — SIGNIFICANT CHANGE UP
WBC # BLD: 11.3 K/UL — HIGH (ref 3.8–10.5)
WBC # FLD AUTO: 11.3 K/UL — HIGH (ref 3.8–10.5)

## 2019-05-07 RX ORDER — MIDODRINE HYDROCHLORIDE 2.5 MG/1
5 TABLET ORAL THREE TIMES A DAY
Qty: 0 | Refills: 0 | Status: DISCONTINUED | OUTPATIENT
Start: 2019-05-07 | End: 2019-05-09

## 2019-05-07 RX ADMIN — Medication 100 MILLIGRAM(S): at 06:24

## 2019-05-07 RX ADMIN — OLANZAPINE 2.5 MILLIGRAM(S): 15 TABLET, FILM COATED ORAL at 17:44

## 2019-05-07 RX ADMIN — RASAGILINE 1 MILLIGRAM(S): 0.5 TABLET ORAL at 11:11

## 2019-05-07 RX ADMIN — SERTRALINE 100 MILLIGRAM(S): 25 TABLET, FILM COATED ORAL at 11:11

## 2019-05-07 RX ADMIN — LUBIPROSTONE 8 MICROGRAM(S): 24 CAPSULE, GELATIN COATED ORAL at 06:24

## 2019-05-07 RX ADMIN — Medication 1 MILLIGRAM(S): at 11:11

## 2019-05-07 RX ADMIN — Medication 1 APPLICATION(S): at 06:24

## 2019-05-07 RX ADMIN — Medication 100 MILLIGRAM(S): at 11:11

## 2019-05-07 RX ADMIN — MIDODRINE HYDROCHLORIDE 5 MILLIGRAM(S): 2.5 TABLET ORAL at 06:25

## 2019-05-07 RX ADMIN — Medication 100 MILLIGRAM(S): at 17:43

## 2019-05-07 RX ADMIN — LUBIPROSTONE 8 MICROGRAM(S): 24 CAPSULE, GELATIN COATED ORAL at 17:43

## 2019-05-07 RX ADMIN — LIDOCAINE 1 APPLICATION(S): 4 CREAM TOPICAL at 17:43

## 2019-05-07 RX ADMIN — CARBIDOPA AND LEVODOPA 2 CAPSULE(S): 25; 100 TABLET ORAL at 11:15

## 2019-05-07 RX ADMIN — LIDOCAINE 1 APPLICATION(S): 4 CREAM TOPICAL at 06:23

## 2019-05-07 RX ADMIN — CARBIDOPA AND LEVODOPA 2 CAPSULE(S): 25; 100 TABLET ORAL at 08:59

## 2019-05-07 RX ADMIN — MIDODRINE HYDROCHLORIDE 5 MILLIGRAM(S): 2.5 TABLET ORAL at 17:43

## 2019-05-07 RX ADMIN — Medication 1 APPLICATION(S): at 17:43

## 2019-05-07 RX ADMIN — CARBIDOPA AND LEVODOPA 2 CAPSULE(S): 25; 100 TABLET ORAL at 22:36

## 2019-05-07 RX ADMIN — CARBIDOPA AND LEVODOPA 2 CAPSULE(S): 25; 100 TABLET ORAL at 17:44

## 2019-05-07 RX ADMIN — SIMVASTATIN 10 MILLIGRAM(S): 20 TABLET, FILM COATED ORAL at 22:36

## 2019-05-07 RX ADMIN — MIDODRINE HYDROCHLORIDE 5 MILLIGRAM(S): 2.5 TABLET ORAL at 22:34

## 2019-05-07 NOTE — PROGRESS NOTE ADULT - ATTENDING COMMENTS
Care plan has been delineated  Discussed with CM  Thank you for the consult  Feel free to reconsult if clinical needs arise

## 2019-05-07 NOTE — PROGRESS NOTE ADULT - SUBJECTIVE AND OBJECTIVE BOX
Family met with palliative care this morning  Pt was sleeping when I saw him  Spoke with family member sitting at bedside    Vital Signs Last 24 Hrs  T(C): 36.8 (07 May 2019 15:35), Max: 36.9 (06 May 2019 20:42)  T(F): 98.2 (07 May 2019 15:35), Max: 98.4 (06 May 2019 20:42)  HR: 55 (07 May 2019 15:35) (50 - 79)  BP: 126/73 (07 May 2019 15:35) (101/58 - 147/69)  BP(mean): --  RR: 18 (07 May 2019 15:35) (18 - 18)  SpO2: 96% (07 May 2019 15:35) (94% - 96%)    GENERAL: NAD, chronically ill appearing   HEAD:  Atraumatic, Normocephalic  EYES: EOMI, PERRLA, conjunctiva and sclera clear  NECK: Supple, No JVD, Normal thyroid  CHEST/LUNG: Clear to percussion bilaterally; No rales, rhonchi, wheezing, or rubs no resp distress or accessory muscle usage  HEART: Regular rate and rhythm; No murmurs, rubs, or gallops, trace edema b/l LE.   ABDOMEN: Soft, Nontender, Nondistended; Bowel sounds present. +mild suprapubic tenderness. no rebound/guarding.   EXTREMITIES:  2+ Peripheral Pulses, No clubbing, cyanosis, rom intact  LYMPH: No lymphadenopathy noted, no lymphangitis  SKIN: No rashes or lesions, no petechiae  NERVOUS SYSTEM:  Alert & Oriented X2-3, Good concentration; Motor Strength 5/5 B/L upper and lower extremities  PSYCH: flat affect, no delirium    LABS:                        8.3    11.3  )-----------( 546      ( 07 May 2019 10:23 )             25.6     05-06    141  |  106  |  19  ----------------------------<  104<H>  3.9   |  25  |  1.28    Ca    8.9      06 May 2019 10:55        CAPILLARY BLOOD GLUCOSE

## 2019-05-07 NOTE — CHART NOTE - NSCHARTNOTEFT_GEN_A_CORE
Patient with improvement with orthostatics - 148/50 lying HR 50, sitting 90/57 HR 76 and standing increases to 161/50 with  patient less dizzy but requesting to be put back to bed soon after standing  Long discussion with patient and wife regarding plan of care  Continue midodrine 5mg TID      [id number]

## 2019-05-07 NOTE — PROGRESS NOTE ADULT - PROBLEM SELECTOR PLAN 8
Appreciate palliative care assistance  Plan is STR with possible transition to LTC and then possible transition to hospice there.

## 2019-05-07 NOTE — PROGRESS NOTE ADULT - SUBJECTIVE AND OBJECTIVE BOX
No complaints  Obtaining orthostatics during the interview  Clear  LT partner at the bedside          T(C): 36.6 (05-07-19 @ 11:29), Max: 36.9 (05-06-19 @ 20:42)  HR: 50 (05-07-19 @ 05:22) (50 - 79)  BP: 110/67 (05-07-19 @ 05:22) (101/58 - 147/69)  RR: 18 (05-07-19 @ 05:22) (18 - 18)  SpO2: 94% (05-07-19 @ 05:22) (94% - 96%)  Wt(kg): --      06 May 2019 07:01  -  07 May 2019 07:00  --------------------------------------------------------  IN:    Oral Fluid: 660 mL  Total IN: 660 mL    OUT:    Indwelling Catheter - Urethral: 600 mL  Total OUT: 600 mL    Total NET: 60 mL          05-06 @ 07:01  -  05-07 @ 07:00  --------------------------------------------------------  IN: 660 mL / OUT: 600 mL / NET: 60 mL      CAPILLARY BLOOD GLUCOSE            acetaminophen   Tablet .. 650 milliGRAM(s) Oral every 6 hours PRN  allopurinol 100 milliGRAM(s) Oral daily  amantadine 100 milliGRAM(s) Oral every 12 hours  carbidopa 36.25 mG/levodopa 145 mG ER 2 Capsule(s) Oral <User Schedule>  docusate sodium 100 milliGRAM(s) Oral two times a day  erythromycin   Ointment 1 Application(s) Both EYES two times a day  folic acid 1 milliGRAM(s) Oral daily  lidocaine 2% Gel 1 Application(s) Topical two times a day  lubiprostone 8 MICROGram(s) Oral two times a day  midodrine 5 milliGRAM(s) Oral <User Schedule>  OLANZapine 2.5 milliGRAM(s) Oral <User Schedule>  polyethylene glycol 3350 17 Gram(s) Oral daily PRN  rasagiline Tablet 1 milliGRAM(s) Oral daily  sertraline 100 milliGRAM(s) Oral daily  simvastatin 10 milliGRAM(s) Oral at bedtime          05-06    141  |  106  |  19  ----------------------------<  104<H>  3.9   |  25  |  1.28    Ca    8.9      06 May 2019 10:55        Procalc  BNP  ABG                          8.3    11.3  )-----------( 546      ( 07 May 2019 10:23 )             25.6           blood and urine cultures            PERTINENT PM/SXH:   Gout  Prostate cancer  HLD (hyperlipidemia)  HTN (hypertension)  Depression  Parkinson's disease    S/P TURP    FAMILY HISTORY:  No pertinent family history in first degree relatives    ITEMS NOT CHECKED ARE NOT PRESENT    SOCIAL HISTORY:   Significant other/partner:  [x ]  Children:  [x ]  Oriental orthodox/Spirituality:  Substance hx:  [ ]   Tobacco hx:  [x ]   Alcohol hx: [ ]   Home Opioid hx:  [ ] I-Stop Reference No:  Living Situation: [ x]Home  [ ]Long term care  [ ]Rehab [ ]Other    ADVANCE DIRECTIVES:    DNR  Yes  MOLST  [x ]  Living Will  [ ]   DECISION MAKER(s):  [ ] Health Care Proxy(s)  [ ] Surrogate(s)  [ ] Guardian           Name(s): Phone Number(s):    BASELINE (I)ADL(s) (prior to admission):  Merna: [ ]Total  [ ] Moderate [ ]Dependent    Allergies    No Known Allergies    Intolerances    MEDICATIONS  (STANDING):  allopurinol 100 milliGRAM(s) Oral daily  amantadine 100 milliGRAM(s) Oral every 12 hours  carbidopa 36.25 mG/levodopa 145 mG ER 2 Capsule(s) Oral <User Schedule>  cefTRIAXone   IVPB 1 Gram(s) IV Intermittent every 24 hours  cefTRIAXone   IVPB      docusate sodium 100 milliGRAM(s) Oral two times a day  erythromycin   Ointment 1 Application(s) Both EYES two times a day  folic acid 1 milliGRAM(s) Oral daily  lidocaine 2% Gel 1 Application(s) Topical two times a day  lubiprostone 8 MICROGram(s) Oral two times a day  rasagiline Tablet 1 milliGRAM(s) Oral daily  sertraline 100 milliGRAM(s) Oral daily  simvastatin 10 milliGRAM(s) Oral at bedtime  sodium chloride 0.9%. 1000 milliLiter(s) (75 mL/Hr) IV Continuous <Continuous>    MEDICATIONS  (PRN):  acetaminophen   Tablet .. 650 milliGRAM(s) Oral every 6 hours PRN Temp greater or equal to 38C (100.4F), Mild Pain (1 - 3), Moderate Pain (4 - 6), Severe Pain (7 - 10)  polyethylene glycol 3350 17 Gram(s) Oral daily PRN Constipation    PRESENT SYMPTOMS: [ ]Unable to obtain due to poor mentation   Source if other than patient:  [ ]Family   [ ]Team     Pain (Impact on QOL):    Location -         Minimal acceptable level (0-10 scale):                    Aggravating factors -  Quality -  Radiation -  Severity (0-10 scale) -    Timing -    PAIN AD Score:     [email or web address] (press ctrl +  left click to view)    Dyspnea:                           [ ]Mild [ ]Moderate [ ]Severe  Anxiety:                             [ ]Mild [ ]Moderate [ ]Severe  Fatigue:                             [x ]Mild [ ]Moderate [ ]Severe  Nausea:                             [ ]Mild [ ]Moderate [ ]Severe  Loss of appetite:              [ ]Mild [ ]Moderate [ ]Severe  Constipation:                    [ ]Mild [ ]Moderate [ ]Severe    Other Symptoms:  [ ]All other review of systems negative     Karnofsky Performance Score/Palliative Performance Status Version 2:      50   %    http://palliative.info/resource_material/[email or web address].pdf  PHYSICAL EXAM:  Vital Signs Last 24 Hrs  T(C): 36.6 (02 May 2019 14:03), Max: 36.7 (01 May 2019 21:09)  T(F): 97.8 (02 May 2019 14:03), Max: 98.1 (01 May 2019 21:09)  HR: 66 (02 May 2019 14:03) (64 - 66)  BP: 123/71 (02 May 2019 14:03) (106/62 - 123/71)  BP(mean): --  RR: 18 (02 May 2019 14:03) (18 - 18)  SpO2: 98% (02 May 2019 14:03) (95% - 98%) I&O's Summary    01 May 2019 07:01  -  02 May 2019 07:00  --------------------------------------------------------  IN: 190 mL / OUT: 800 mL / NET: -610 mL    02 May 2019 07:01  -  02 May 2019 14:10  --------------------------------------------------------  IN: 280 mL / OUT: 300 mL / NET: -20 mL    GENERAL:  [ x]Alert  [ ]Oriented x   [ ]Lethargic  [ ]Cachexia  [ ]Unarousable  [ ]Verbal  [ ]Non-Verbal  Behavioral:   [ ] Anxiety  [ ] Delirium [ ] Agitation [x ] Other Calm  HEENT:  [x ]Normal   [ ]Dry mouth   [ ]ET Tube/Trach  [ ]Oral lesions  PULMONARY:   [x ]Clear [ ]Tachypnea  [ ]Audible excessive secretions   [ ]Rhonchi        [ ]Right [ ]Left [ ]Bilateral  [ ]Crackles        [ ]Right [ ]Left [ ]Bilateral  [ ]Wheezing     [ ]Right [ ]Left [ ]Bilateral  CARDIOVASCULAR:    [x ]Regular [ ]Irregular [ ]Tachy  [ ]Amor [ ]Murmur [ ]Other  GASTROINTESTINAL:  [ x]Soft  [ ]Distended   [ ]+BS  [ ]Non tender [ ]Tender  [ ]PEG [ ]OGT/ NGT  Last BM:   GENITOURINARY:  [ ]Normal [ ] Incontinent   [ ]Oliguria/Anuria   [x ]Arrieta with hematuria  MUSCULOSKELETAL:   [ ]Normal   [x ]Weakness  [ ]Bed/Wheelchair bound [ ]Edema  NEUROLOGIC:   [xx ]No focal deficits  [ ] Cognitive impairment  [ ] Dysphagia [ ]Dysarthria [ ] Paresis [ ]Other   SKIN:   [ x]Normal   [ ]Pressure ulcer(s)  [ ]Rash    CRITICAL CARE:  [ ] Shock Present  [ ]Septic [ ]Cardiogenic [ ]Neurologic [ ]Hypovolemic  [ ]  Vasopressors [ ]  Inotropes   [ ] Respiratory failure present  [ ] Acute  [ ] Chronic [ ] Hypoxic  [ ] Hypercarbic [ ] Other  [ ] Other organ failure     LABS:                        8.1    13.10 )-----------( 562      ( 02 May 2019 08:33 )             26.2   05-02    142  |  107  |  21  ----------------------------<  78  4.0   |  20<L>  |  1.65<H>    Ca    9.1      02 May 2019 05:35  Phos  3.5     05-01  Mg     1.8     05-01          RADIOLOGY & ADDITIONAL STUDIES:    PROTEIN CALORIE MALNUTRITION PRESENT: [ ] Yes [ ] No  [ ] PPSV2 < or = to 30% [ ] significant weight loss  [ ] poor nutritional intake [ ] catabolic state [ ] anasarca     Albumin, Serum: 2.7 g/dL (04-29-19 @ 06:45)  Artificial Nutrition [ ]     REFERRALS:   [ ]Chaplaincy  [ ] Hospice  [ ]Child Life  [ ]Social Work  [ ]Case management [ ]Holistic Therapy   Goals of Care Discussion Document:

## 2019-05-08 LAB
ANION GAP SERPL CALC-SCNC: 12 MMOL/L — SIGNIFICANT CHANGE UP (ref 5–17)
BUN SERPL-MCNC: 20 MG/DL — SIGNIFICANT CHANGE UP (ref 7–23)
CALCIUM SERPL-MCNC: 9.1 MG/DL — SIGNIFICANT CHANGE UP (ref 8.4–10.5)
CHLORIDE SERPL-SCNC: 107 MMOL/L — SIGNIFICANT CHANGE UP (ref 96–108)
CO2 SERPL-SCNC: 23 MMOL/L — SIGNIFICANT CHANGE UP (ref 22–31)
CREAT SERPL-MCNC: 1.37 MG/DL — HIGH (ref 0.5–1.3)
GLUCOSE SERPL-MCNC: 92 MG/DL — SIGNIFICANT CHANGE UP (ref 70–99)
HCT VFR BLD CALC: 24.5 % — LOW (ref 39–50)
HGB BLD-MCNC: 8.4 G/DL — LOW (ref 13–17)
MCHC RBC-ENTMCNC: 30.8 PG — SIGNIFICANT CHANGE UP (ref 27–34)
MCHC RBC-ENTMCNC: 34 GM/DL — SIGNIFICANT CHANGE UP (ref 32–36)
MCV RBC AUTO: 90.6 FL — SIGNIFICANT CHANGE UP (ref 80–100)
PLATELET # BLD AUTO: 619 K/UL — HIGH (ref 150–400)
POTASSIUM SERPL-MCNC: 4.1 MMOL/L — SIGNIFICANT CHANGE UP (ref 3.5–5.3)
POTASSIUM SERPL-SCNC: 4.1 MMOL/L — SIGNIFICANT CHANGE UP (ref 3.5–5.3)
RBC # BLD: 2.71 M/UL — LOW (ref 4.2–5.8)
RBC # FLD: 14.3 % — SIGNIFICANT CHANGE UP (ref 10.3–14.5)
SODIUM SERPL-SCNC: 142 MMOL/L — SIGNIFICANT CHANGE UP (ref 135–145)
WBC # BLD: 12.9 K/UL — HIGH (ref 3.8–10.5)
WBC # FLD AUTO: 12.9 K/UL — HIGH (ref 3.8–10.5)

## 2019-05-08 RX ORDER — SODIUM CHLORIDE 9 MG/ML
1000 INJECTION INTRAMUSCULAR; INTRAVENOUS; SUBCUTANEOUS
Qty: 0 | Refills: 0 | Status: DISCONTINUED | OUTPATIENT
Start: 2019-05-08 | End: 2019-05-09

## 2019-05-08 RX ADMIN — Medication 1 APPLICATION(S): at 17:02

## 2019-05-08 RX ADMIN — MIDODRINE HYDROCHLORIDE 5 MILLIGRAM(S): 2.5 TABLET ORAL at 06:22

## 2019-05-08 RX ADMIN — CARBIDOPA AND LEVODOPA 2 CAPSULE(S): 25; 100 TABLET ORAL at 21:43

## 2019-05-08 RX ADMIN — Medication 100 MILLIGRAM(S): at 06:13

## 2019-05-08 RX ADMIN — LUBIPROSTONE 8 MICROGRAM(S): 24 CAPSULE, GELATIN COATED ORAL at 17:02

## 2019-05-08 RX ADMIN — LIDOCAINE 1 APPLICATION(S): 4 CREAM TOPICAL at 06:12

## 2019-05-08 RX ADMIN — Medication 1 APPLICATION(S): at 06:13

## 2019-05-08 RX ADMIN — CARBIDOPA AND LEVODOPA 2 CAPSULE(S): 25; 100 TABLET ORAL at 11:22

## 2019-05-08 RX ADMIN — RASAGILINE 1 MILLIGRAM(S): 0.5 TABLET ORAL at 11:23

## 2019-05-08 RX ADMIN — SIMVASTATIN 10 MILLIGRAM(S): 20 TABLET, FILM COATED ORAL at 21:43

## 2019-05-08 RX ADMIN — CARBIDOPA AND LEVODOPA 2 CAPSULE(S): 25; 100 TABLET ORAL at 16:43

## 2019-05-08 RX ADMIN — LIDOCAINE 1 APPLICATION(S): 4 CREAM TOPICAL at 17:02

## 2019-05-08 RX ADMIN — SERTRALINE 100 MILLIGRAM(S): 25 TABLET, FILM COATED ORAL at 11:22

## 2019-05-08 RX ADMIN — Medication 1 MILLIGRAM(S): at 11:22

## 2019-05-08 RX ADMIN — Medication 100 MILLIGRAM(S): at 17:02

## 2019-05-08 RX ADMIN — MIDODRINE HYDROCHLORIDE 5 MILLIGRAM(S): 2.5 TABLET ORAL at 16:43

## 2019-05-08 RX ADMIN — Medication 100 MILLIGRAM(S): at 11:23

## 2019-05-08 RX ADMIN — OLANZAPINE 2.5 MILLIGRAM(S): 15 TABLET, FILM COATED ORAL at 17:02

## 2019-05-08 RX ADMIN — CARBIDOPA AND LEVODOPA 2 CAPSULE(S): 25; 100 TABLET ORAL at 08:34

## 2019-05-08 RX ADMIN — Medication 100 MILLIGRAM(S): at 06:12

## 2019-05-08 RX ADMIN — MIDODRINE HYDROCHLORIDE 5 MILLIGRAM(S): 2.5 TABLET ORAL at 21:43

## 2019-05-08 RX ADMIN — LUBIPROSTONE 8 MICROGRAM(S): 24 CAPSULE, GELATIN COATED ORAL at 06:12

## 2019-05-08 NOTE — PROGRESS NOTE ADULT - SUBJECTIVE AND OBJECTIVE BOX
Patient is a 78y old  Male who presents with a chief complaint of tachycardia, hypotension, osmin (07 May 2019 16:34)      SUBJECTIVE / OVERNIGHT EVENTS:  awake alert, at baseline  núñez with hematuria.  no cp, no sob, no n/v/d. no abdominal pain.  no headache, no dizziness.   midodrine increase to TID. repeat orthostatics. will give 24 hrs of IVF.    Vital Signs Last 24 Hrs  T(C): 36.4 (08 May 2019 14:05), Max: 36.8 (07 May 2019 15:35)  T(F): 97.6 (08 May 2019 14:05), Max: 98.2 (07 May 2019 15:35)  HR: 63 (08 May 2019 14:05) (55 - 79)  BP: 113/69 (08 May 2019 14:05) (103/70 - 131/80)  BP(mean): --  RR: 18 (08 May 2019 14:05) (18 - 18)  SpO2: 95% (08 May 2019 14:05) (94% - 100%)  I&O's Summary    07 May 2019 07:01  -  08 May 2019 07:00  --------------------------------------------------------  IN: 1040 mL / OUT: 775 mL / NET: 265 mL    08 May 2019 07:01  -  08 May 2019 14:54  --------------------------------------------------------  IN: 360 mL / OUT: 150 mL / NET: 210 mL        PHYSICAL EXAM:  GENERAL: NAD, Comfortable  HEAD:  Atraumatic, Normocephalic  EYES: EOMI, PERRLA, conjunctiva and sclera clear  NECK: Supple, No JVD  CHEST/LUNG: Clear to auscultation bilaterally; No wheeze  HEART: Regular rate and rhythm; No murmurs, rubs, or gallops  ABDOMEN: Soft, Nontender, Nondistended; Bowel sounds present  Neuro: AAO x 3, no focal deficit, 5/5 b/l extremities  : Núñez in place, +hematuria, neg CVAT   EXTREMITIES:  2+ Peripheral Pulses, No clubbing, cyanosis, or edema  SKIN: No rashes or lesions    LABS:                        8.4    12.9  )-----------( 619      ( 08 May 2019 05:50 )             24.5     05-08    142  |  107  |  20  ----------------------------<  92  4.1   |  23  |  1.37<H>    Ca    9.1      08 May 2019 05:50        CAPILLARY BLOOD GLUCOSE                RADIOLOGY & ADDITIONAL TESTS:    Imaging Personally Reviewed:  [x] YES  [ ] NO    Consultant(s) Notes Reviewed:  [x] YES  [ ] NO      MEDICATIONS  (STANDING):  allopurinol 100 milliGRAM(s) Oral daily  amantadine 100 milliGRAM(s) Oral every 12 hours  carbidopa 36.25 mG/levodopa 145 mG ER 2 Capsule(s) Oral <User Schedule>  docusate sodium 100 milliGRAM(s) Oral two times a day  erythromycin   Ointment 1 Application(s) Both EYES two times a day  folic acid 1 milliGRAM(s) Oral daily  lidocaine 2% Gel 1 Application(s) Topical two times a day  lubiprostone 8 MICROGram(s) Oral two times a day  midodrine 5 milliGRAM(s) Oral three times a day  OLANZapine 2.5 milliGRAM(s) Oral <User Schedule>  rasagiline Tablet 1 milliGRAM(s) Oral daily  sertraline 100 milliGRAM(s) Oral daily  simvastatin 10 milliGRAM(s) Oral at bedtime    MEDICATIONS  (PRN):  acetaminophen   Tablet .. 650 milliGRAM(s) Oral every 6 hours PRN Temp greater or equal to 38C (100.4F), Mild Pain (1 - 3), Moderate Pain (4 - 6), Severe Pain (7 - 10)  polyethylene glycol 3350 17 Gram(s) Oral daily PRN Constipation      Care Discussed with Consultants/Other Providers [x] YES  [ ] NO    HEALTH ISSUES - PROBLEM Dx:  Discharge planning issues: Discharge planning issues  Syncope: Syncope  Delirium due to another medical condition  Delirium  Palliative care encounter: Palliative care encounter  ACP (advance care planning): ACP (advance care planning)  Debility: Debility  Agitation: Agitation  Hypotension: Hypotension  Leukocytosis: Leukocytosis  Prophylactic measure: Prophylactic measure  Parkinson's disease: Parkinson&#x27;s disease  OSMIN (acute kidney injury): OSMIN (acute kidney injury)  Hematuria, unspecified type: Hematuria, unspecified type  Sinus tachycardia: Sinus tachycardia  Complicated UTI (urinary tract infection): Complicated UTI (urinary tract infection)

## 2019-05-08 NOTE — PROGRESS NOTE ADULT - ATTENDING COMMENTS
midodrine increase to TID. repeat orthostatics. will give 24 hrs of IVF.    - Dr. JESUS Liuet ([facility])  - [phone]

## 2019-05-09 LAB
ANION GAP SERPL CALC-SCNC: 8 MMOL/L — SIGNIFICANT CHANGE UP (ref 5–17)
BUN SERPL-MCNC: 18 MG/DL — SIGNIFICANT CHANGE UP (ref 7–23)
CALCIUM SERPL-MCNC: 8.7 MG/DL — SIGNIFICANT CHANGE UP (ref 8.4–10.5)
CHLORIDE SERPL-SCNC: 107 MMOL/L — SIGNIFICANT CHANGE UP (ref 96–108)
CO2 SERPL-SCNC: 24 MMOL/L — SIGNIFICANT CHANGE UP (ref 22–31)
CREAT SERPL-MCNC: 1.19 MG/DL — SIGNIFICANT CHANGE UP (ref 0.5–1.3)
GLUCOSE SERPL-MCNC: 85 MG/DL — SIGNIFICANT CHANGE UP (ref 70–99)
HCT VFR BLD CALC: 25.5 % — LOW (ref 39–50)
HGB BLD-MCNC: 7.8 G/DL — LOW (ref 13–17)
MCHC RBC-ENTMCNC: 28.3 PG — SIGNIFICANT CHANGE UP (ref 27–34)
MCHC RBC-ENTMCNC: 30.6 GM/DL — LOW (ref 32–36)
MCV RBC AUTO: 92.4 FL — SIGNIFICANT CHANGE UP (ref 80–100)
PLATELET # BLD AUTO: 513 K/UL — HIGH (ref 150–400)
POTASSIUM SERPL-MCNC: 4.2 MMOL/L — SIGNIFICANT CHANGE UP (ref 3.5–5.3)
POTASSIUM SERPL-SCNC: 4.2 MMOL/L — SIGNIFICANT CHANGE UP (ref 3.5–5.3)
RBC # BLD: 2.76 M/UL — LOW (ref 4.2–5.8)
RBC # FLD: 15.4 % — HIGH (ref 10.3–14.5)
SODIUM SERPL-SCNC: 139 MMOL/L — SIGNIFICANT CHANGE UP (ref 135–145)
WBC # BLD: 11.02 K/UL — HIGH (ref 3.8–10.5)
WBC # FLD AUTO: 11.02 K/UL — HIGH (ref 3.8–10.5)

## 2019-05-09 RX ORDER — MIDODRINE HYDROCHLORIDE 2.5 MG/1
10 TABLET ORAL THREE TIMES A DAY
Refills: 0 | Status: DISCONTINUED | OUTPATIENT
Start: 2019-05-09 | End: 2019-05-11

## 2019-05-09 RX ORDER — SODIUM CHLORIDE 9 MG/ML
1000 INJECTION INTRAMUSCULAR; INTRAVENOUS; SUBCUTANEOUS
Refills: 0 | Status: DISCONTINUED | OUTPATIENT
Start: 2019-05-09 | End: 2019-05-09

## 2019-05-09 RX ORDER — MIDODRINE HYDROCHLORIDE 2.5 MG/1
10 TABLET ORAL EVERY 8 HOURS
Refills: 0 | Status: DISCONTINUED | OUTPATIENT
Start: 2019-05-09 | End: 2019-05-09

## 2019-05-09 RX ADMIN — Medication 1 MILLIGRAM(S): at 11:34

## 2019-05-09 RX ADMIN — Medication 100 MILLIGRAM(S): at 11:34

## 2019-05-09 RX ADMIN — SIMVASTATIN 10 MILLIGRAM(S): 20 TABLET, FILM COATED ORAL at 21:07

## 2019-05-09 RX ADMIN — MIDODRINE HYDROCHLORIDE 10 MILLIGRAM(S): 2.5 TABLET ORAL at 21:13

## 2019-05-09 RX ADMIN — CARBIDOPA AND LEVODOPA 2 CAPSULE(S): 25; 100 TABLET ORAL at 13:08

## 2019-05-09 RX ADMIN — LUBIPROSTONE 8 MICROGRAM(S): 24 CAPSULE, GELATIN COATED ORAL at 06:46

## 2019-05-09 RX ADMIN — SERTRALINE 100 MILLIGRAM(S): 25 TABLET, FILM COATED ORAL at 11:34

## 2019-05-09 RX ADMIN — Medication 1 APPLICATION(S): at 17:01

## 2019-05-09 RX ADMIN — LIDOCAINE 1 APPLICATION(S): 4 CREAM TOPICAL at 17:01

## 2019-05-09 RX ADMIN — Medication 100 MILLIGRAM(S): at 17:01

## 2019-05-09 RX ADMIN — CARBIDOPA AND LEVODOPA 2 CAPSULE(S): 25; 100 TABLET ORAL at 17:02

## 2019-05-09 RX ADMIN — SODIUM CHLORIDE 75 MILLILITER(S): 9 INJECTION INTRAMUSCULAR; INTRAVENOUS; SUBCUTANEOUS at 19:14

## 2019-05-09 RX ADMIN — MIDODRINE HYDROCHLORIDE 10 MILLIGRAM(S): 2.5 TABLET ORAL at 13:07

## 2019-05-09 RX ADMIN — OLANZAPINE 2.5 MILLIGRAM(S): 15 TABLET, FILM COATED ORAL at 17:01

## 2019-05-09 RX ADMIN — Medication 100 MILLIGRAM(S): at 06:47

## 2019-05-09 RX ADMIN — CARBIDOPA AND LEVODOPA 2 CAPSULE(S): 25; 100 TABLET ORAL at 21:08

## 2019-05-09 RX ADMIN — LIDOCAINE 1 APPLICATION(S): 4 CREAM TOPICAL at 06:47

## 2019-05-09 RX ADMIN — RASAGILINE 1 MILLIGRAM(S): 0.5 TABLET ORAL at 11:34

## 2019-05-09 RX ADMIN — MIDODRINE HYDROCHLORIDE 5 MILLIGRAM(S): 2.5 TABLET ORAL at 06:47

## 2019-05-09 RX ADMIN — Medication 1 APPLICATION(S): at 05:44

## 2019-05-09 RX ADMIN — LUBIPROSTONE 8 MICROGRAM(S): 24 CAPSULE, GELATIN COATED ORAL at 17:01

## 2019-05-09 RX ADMIN — CARBIDOPA AND LEVODOPA 2 CAPSULE(S): 25; 100 TABLET ORAL at 09:42

## 2019-05-09 NOTE — PROGRESS NOTE ADULT - ATTENDING COMMENTS
midodrine increase to 10 mg TID. repeat orthostatics. will give 24 hrs of IVF.  Physical therapy. Out of bed to chair with assistance.     - Dr. JESUS Frankel ([facility])  - [phone]

## 2019-05-09 NOTE — PROGRESS NOTE ADULT - SUBJECTIVE AND OBJECTIVE BOX
Patient is a 78y old  Male who presents with a chief complaint of tachycardia, hypotension, osmin (08 May 2019 14:53)      SUBJECTIVE / OVERNIGHT EVENTS:  feels well.   still orthostatic.  midodrine increase to 10 mg TID  on IVF.       Vital Signs Last 24 Hrs  T(C): 36.5 (09 May 2019 10:24), Max: 36.7 (08 May 2019 20:46)  T(F): 97.7 (09 May 2019 10:24), Max: 98.1 (08 May 2019 20:46)  HR: 55 (09 May 2019 04:05) (54 - 55)  BP: 99/56 (09 May 2019 04:05) (99/56 - 103/62)  BP(mean): --  RR: 18 (09 May 2019 10:24) (18 - 18)  SpO2: 99% (09 May 2019 10:24) (96% - 99%)  I&O's Summary    08 May 2019 07:01  -  09 May 2019 07:00  --------------------------------------------------------  IN: 1550 mL / OUT: 450 mL / NET: 1100 mL    09 May 2019 07:01  -  09 May 2019 18:37  --------------------------------------------------------  IN: 945 mL / OUT: 0 mL / NET: 945 mL        PHYSICAL EXAM:  GENERAL: NAD, Comfortable  HEAD:  Atraumatic, Normocephalic  EYES: EOMI, PERRLA, conjunctiva and sclera clear  NECK: Supple, No JVD  CHEST/LUNG: Clear to auscultation bilaterally; No wheeze  HEART: Regular rate and rhythm; No murmurs, rubs, or gallops  ABDOMEN: Soft, Nontender, Nondistended; Bowel sounds present  Neuro: AAO x 3, no focal deficit, 5/5 b/l extremities  : Arrieta in place, +hematuria, but less red. neg CVAT   EXTREMITIES:  2+ Peripheral Pulses, No clubbing, cyanosis, or edema  SKIN: No rashes or lesions      LABS:                        7.8    11.02 )-----------( 513      ( 09 May 2019 09:25 )             25.5     05-09    139  |  107  |  18  ----------------------------<  85  4.2   |  24  |  1.19    Ca    8.7      09 May 2019 06:59        CAPILLARY BLOOD GLUCOSE                RADIOLOGY & ADDITIONAL TESTS:    Imaging Personally Reviewed:  [x] YES  [ ] NO    Consultant(s) Notes Reviewed:  [x] YES  [ ] NO      MEDICATIONS  (STANDING):  allopurinol 100 milliGRAM(s) Oral daily  amantadine 100 milliGRAM(s) Oral every 12 hours  carbidopa 36.25 mG/levodopa 145 mG ER 2 Capsule(s) Oral <User Schedule>  docusate sodium 100 milliGRAM(s) Oral two times a day  erythromycin   Ointment 1 Application(s) Both EYES two times a day  folic acid 1 milliGRAM(s) Oral daily  lidocaine 2% Gel 1 Application(s) Topical two times a day  lubiprostone 8 MICROGram(s) Oral two times a day  midodrine 10 milliGRAM(s) Oral three times a day  OLANZapine 2.5 milliGRAM(s) Oral <User Schedule>  rasagiline Tablet 1 milliGRAM(s) Oral daily  sertraline 100 milliGRAM(s) Oral daily  simvastatin 10 milliGRAM(s) Oral at bedtime  sodium chloride 0.9%. 1000 milliLiter(s) (75 mL/Hr) IV Continuous <Continuous>    MEDICATIONS  (PRN):  acetaminophen   Tablet .. 650 milliGRAM(s) Oral every 6 hours PRN Temp greater or equal to 38C (100.4F), Mild Pain (1 - 3), Moderate Pain (4 - 6), Severe Pain (7 - 10)  polyethylene glycol 3350 17 Gram(s) Oral daily PRN Constipation      Care Discussed with Consultants/Other Providers [x] YES  [ ] NO    HEALTH ISSUES - PROBLEM Dx:  Discharge planning issues: Discharge planning issues  Syncope: Syncope  Delirium due to another medical condition  Delirium  Palliative care encounter: Palliative care encounter  ACP (advance care planning): ACP (advance care planning)  Debility: Debility  Agitation: Agitation  Hypotension: Hypotension  Leukocytosis: Leukocytosis  Prophylactic measure: Prophylactic measure  Parkinson's disease: Parkinson&#x27;s disease  OSMIN (acute kidney injury): OSMIN (acute kidney injury)  Hematuria, unspecified type: Hematuria, unspecified type  Sinus tachycardia: Sinus tachycardia  Complicated UTI (urinary tract infection): Complicated UTI (urinary tract infection)

## 2019-05-10 ENCOUNTER — TRANSCRIPTION ENCOUNTER (OUTPATIENT)
Age: 79
End: 2019-05-10

## 2019-05-10 LAB
HCT VFR BLD CALC: 28.4 % — LOW (ref 39–50)
HGB BLD-MCNC: 9.4 G/DL — LOW (ref 13–17)
MCHC RBC-ENTMCNC: 30 PG — SIGNIFICANT CHANGE UP (ref 27–34)
MCHC RBC-ENTMCNC: 33.3 GM/DL — SIGNIFICANT CHANGE UP (ref 32–36)
MCV RBC AUTO: 90.1 FL — SIGNIFICANT CHANGE UP (ref 80–100)
PLATELET # BLD AUTO: 584 K/UL — HIGH (ref 150–400)
RBC # BLD: 3.15 M/UL — LOW (ref 4.2–5.8)
RBC # FLD: 14.3 % — SIGNIFICANT CHANGE UP (ref 10.3–14.5)
WBC # BLD: 14.3 K/UL — HIGH (ref 3.8–10.5)
WBC # FLD AUTO: 14.3 K/UL — HIGH (ref 3.8–10.5)

## 2019-05-10 RX ORDER — OLANZAPINE 15 MG/1
1 TABLET, FILM COATED ORAL
Qty: 0 | Refills: 0 | DISCHARGE
Start: 2019-05-10

## 2019-05-10 RX ORDER — MIDODRINE HYDROCHLORIDE 2.5 MG/1
1 TABLET ORAL
Qty: 0 | Refills: 0 | DISCHARGE
Start: 2019-05-10

## 2019-05-10 RX ORDER — FLUDROCORTISONE ACETATE 0.1 MG/1
0.1 TABLET ORAL DAILY
Refills: 0 | Status: DISCONTINUED | OUTPATIENT
Start: 2019-05-10 | End: 2019-05-11

## 2019-05-10 RX ORDER — ERYTHROMYCIN BASE 5 MG/GRAM
1 OINTMENT (GRAM) OPHTHALMIC (EYE)
Qty: 0 | Refills: 0 | DISCHARGE
Start: 2019-05-10

## 2019-05-10 RX ADMIN — LUBIPROSTONE 8 MICROGRAM(S): 24 CAPSULE, GELATIN COATED ORAL at 06:40

## 2019-05-10 RX ADMIN — SIMVASTATIN 10 MILLIGRAM(S): 20 TABLET, FILM COATED ORAL at 22:44

## 2019-05-10 RX ADMIN — Medication 100 MILLIGRAM(S): at 11:43

## 2019-05-10 RX ADMIN — LIDOCAINE 1 APPLICATION(S): 4 CREAM TOPICAL at 17:05

## 2019-05-10 RX ADMIN — MIDODRINE HYDROCHLORIDE 10 MILLIGRAM(S): 2.5 TABLET ORAL at 13:01

## 2019-05-10 RX ADMIN — Medication 1 APPLICATION(S): at 06:40

## 2019-05-10 RX ADMIN — Medication 100 MILLIGRAM(S): at 06:40

## 2019-05-10 RX ADMIN — Medication 0.5 MILLIGRAM(S): at 22:04

## 2019-05-10 RX ADMIN — Medication 1 MILLIGRAM(S): at 11:42

## 2019-05-10 RX ADMIN — CARBIDOPA AND LEVODOPA 2 CAPSULE(S): 25; 100 TABLET ORAL at 11:42

## 2019-05-10 RX ADMIN — LIDOCAINE 1 APPLICATION(S): 4 CREAM TOPICAL at 06:40

## 2019-05-10 RX ADMIN — SERTRALINE 100 MILLIGRAM(S): 25 TABLET, FILM COATED ORAL at 11:43

## 2019-05-10 RX ADMIN — MIDODRINE HYDROCHLORIDE 10 MILLIGRAM(S): 2.5 TABLET ORAL at 06:40

## 2019-05-10 RX ADMIN — Medication 1 APPLICATION(S): at 17:05

## 2019-05-10 RX ADMIN — Medication 100 MILLIGRAM(S): at 17:05

## 2019-05-10 RX ADMIN — Medication 100 MILLIGRAM(S): at 17:06

## 2019-05-10 RX ADMIN — OLANZAPINE 2.5 MILLIGRAM(S): 15 TABLET, FILM COATED ORAL at 17:05

## 2019-05-10 RX ADMIN — CARBIDOPA AND LEVODOPA 2 CAPSULE(S): 25; 100 TABLET ORAL at 09:00

## 2019-05-10 RX ADMIN — CARBIDOPA AND LEVODOPA 2 CAPSULE(S): 25; 100 TABLET ORAL at 22:44

## 2019-05-10 RX ADMIN — RASAGILINE 1 MILLIGRAM(S): 0.5 TABLET ORAL at 11:43

## 2019-05-10 RX ADMIN — LUBIPROSTONE 8 MICROGRAM(S): 24 CAPSULE, GELATIN COATED ORAL at 17:05

## 2019-05-10 RX ADMIN — CARBIDOPA AND LEVODOPA 2 CAPSULE(S): 25; 100 TABLET ORAL at 16:14

## 2019-05-10 RX ADMIN — MIDODRINE HYDROCHLORIDE 10 MILLIGRAM(S): 2.5 TABLET ORAL at 22:44

## 2019-05-10 NOTE — PROGRESS NOTE ADULT - NSHPATTENDINGPLANDISCUSS_GEN_ALL_CORE
urology team.
Dr Moon
pt and AMY De Paz
pt and AMY De Paz, d/w the wife on the phone.
pt and NP Cat. d/w CM. d/w the wife at bedside.

## 2019-05-10 NOTE — PROGRESS NOTE ADULT - PROBLEM SELECTOR PLAN 6
-c/w rytary, meds at bedside (rytary not on formulary)  -c/w sertraline, amantadine, and rasagiline  -fall precautions, aspiration precautions  -appreciate neuro input  -I spoke with Dr. Thrasher 5/3/19.
-no pharm vte ppx given hematuria  -cont scd
-c/w rytary, meds at bedside (rytary not on formulary)  -c/w sertraline, amantadine, and rasagiline  -fall precautions, aspiration precautions  -appreciate neuro input  -I spoke with Dr. Thrasher 5/3/19.
-no pharm vte ppx given hematuria  -cont scd
Discussed case with CM  Plan is for [facility]>>>LTC Transition>>>>Hospice if necessary while in LTC

## 2019-05-10 NOTE — PROGRESS NOTE ADULT - PROBLEM SELECTOR PLAN 4
-reports from rehab suggest tachycardia to 150 and irregularity on pulse; EMS records show HR 90s and irregular, but no EKG confirming afib  -cont tele  -suspect some degree of autonomic instability contributing to abn vitals; c/w empiric treatment of UTI as noted
-sushant suspected due to either hemodynamically mediated phenomenon related to hypotension/infection vs. obstructive uropathy  -núñez in place  -s/p IVF on admission  -trend bmp, avoid nephrotoxins, renally dose meds  -urology appreciated
-reports from rehab suggest tachycardia to 150 and irregularity on pulse; EMS records show HR 90s and irregular, but no EKG confirming afib  -cont tele  -suspect some degree of autonomic instability contributing to abn vitals; c/w empiric treatment of UTI as noted
monitor h/h  gu following  getting bladder irrigation
-reports from rehab suggest tachycardia to 150 and irregularity on pulse; EMS records show HR 90s and irregular, but no EKG confirming afib  -cont tele  -suspect some degree of autonomic instability contributing to abn vitals; c/w empiric treatment of UTI as noted
-sushant suspected due to either hemodynamically mediated phenomenon related to hypotension/infection vs. obstructive uropathy  -núñez in place  -s/p IVF on admission  -trend bmp, avoid nephrotoxins, renally dose meds  -urology appreciated
Chronic núñez
monitor h/h  gu following  getting bladder irrigation    d/w wife -- hematuria ongoing since 2/2019  makes acute infection less likely here  But we will try a course of antibiotics here
monitor h/h  gu following  getting bladder irrigation    d/w wife -- hematuria ongoing since 2/2019  makes acute infection less likely here  But we will try a course of antibiotics here
monitor h/h  gu following  getting bladder irrigation    d/w wife -- hematuria ongoing since 2/2019  makes acute infection less likely here  cont monitor off antibx

## 2019-05-10 NOTE — DISCHARGE NOTE PROVIDER - NSDCCPCAREPLAN_GEN_ALL_CORE_FT
PRINCIPAL DISCHARGE DIAGNOSIS  Diagnosis: Hypotension  Assessment and Plan of Treatment: + orthostatic, as per attending this is due to parkinson disease.   Continue current regimen

## 2019-05-10 NOTE — PROGRESS NOTE ADULT - PROVIDER SPECIALTY LIST ADULT
Infectious Disease
Internal Medicine
Palliative Care
Urology
Infectious Disease
Internal Medicine

## 2019-05-10 NOTE — PROGRESS NOTE ADULT - SUBJECTIVE AND OBJECTIVE BOX
Patient is a 78y old  Male who presents with a chief complaint of tachycardia, hypotension, osmin (08 May 2019 14:53)      SUBJECTIVE / OVERNIGHT EVENTS:  feels well.   still orthostatic.  on midodrine 10 mg TID  s/p 48 hrs of IVF.   add Florinef.       Vital Signs Last 24 Hrs  T(C): 36.5 (09 May 2019 10:24), Max: 36.7 (08 May 2019 20:46)  T(F): 97.7 (09 May 2019 10:24), Max: 98.1 (08 May 2019 20:46)  HR: 55 (09 May 2019 04:05) (54 - 55)  BP: 99/56 (09 May 2019 04:05) (99/56 - 103/62)  BP(mean): --  RR: 18 (09 May 2019 10:24) (18 - 18)  SpO2: 99% (09 May 2019 10:24) (96% - 99%)  I&O's Summary    08 May 2019 07:01  -  09 May 2019 07:00  --------------------------------------------------------  IN: 1550 mL / OUT: 450 mL / NET: 1100 mL    09 May 2019 07:01  -  09 May 2019 18:37  --------------------------------------------------------  IN: 945 mL / OUT: 0 mL / NET: 945 mL        PHYSICAL EXAM:  GENERAL: NAD, Comfortable  HEAD:  Atraumatic, Normocephalic  EYES: EOMI, PERRLA, conjunctiva and sclera clear  NECK: Supple, No JVD  CHEST/LUNG: Clear to auscultation bilaterally; No wheeze  HEART: Regular rate and rhythm; No murmurs, rubs, or gallops  ABDOMEN: Soft, Nontender, Nondistended; Bowel sounds present  Neuro: AAO x 3, no focal deficit, 5/5 b/l extremities  : Arrieta in place, much less hematuria, dark brown, neg CVAT   EXTREMITIES:  2+ Peripheral Pulses, No clubbing, cyanosis, or edema  SKIN: No rashes or lesions      LABS:                        7.8    11.02 )-----------( 513      ( 09 May 2019 09:25 )             25.5     05-09    139  |  107  |  18  ----------------------------<  85  4.2   |  24  |  1.19    Ca    8.7      09 May 2019 06:59        CAPILLARY BLOOD GLUCOSE                RADIOLOGY & ADDITIONAL TESTS:    Imaging Personally Reviewed:  [x] YES  [ ] NO    Consultant(s) Notes Reviewed:  [x] YES  [ ] NO      MEDICATIONS  (STANDING):  allopurinol 100 milliGRAM(s) Oral daily  amantadine 100 milliGRAM(s) Oral every 12 hours  carbidopa 36.25 mG/levodopa 145 mG ER 2 Capsule(s) Oral <User Schedule>  docusate sodium 100 milliGRAM(s) Oral two times a day  erythromycin   Ointment 1 Application(s) Both EYES two times a day  folic acid 1 milliGRAM(s) Oral daily  lidocaine 2% Gel 1 Application(s) Topical two times a day  lubiprostone 8 MICROGram(s) Oral two times a day  midodrine 10 milliGRAM(s) Oral three times a day  OLANZapine 2.5 milliGRAM(s) Oral <User Schedule>  rasagiline Tablet 1 milliGRAM(s) Oral daily  sertraline 100 milliGRAM(s) Oral daily  simvastatin 10 milliGRAM(s) Oral at bedtime  sodium chloride 0.9%. 1000 milliLiter(s) (75 mL/Hr) IV Continuous <Continuous>    MEDICATIONS  (PRN):  acetaminophen   Tablet .. 650 milliGRAM(s) Oral every 6 hours PRN Temp greater or equal to 38C (100.4F), Mild Pain (1 - 3), Moderate Pain (4 - 6), Severe Pain (7 - 10)  polyethylene glycol 3350 17 Gram(s) Oral daily PRN Constipation      Care Discussed with Consultants/Other Providers [x] YES  [ ] NO    HEALTH ISSUES - PROBLEM Dx:  Discharge planning issues: Discharge planning issues  Syncope: Syncope  Delirium due to another medical condition  Delirium  Palliative care encounter: Palliative care encounter  ACP (advance care planning): ACP (advance care planning)  Debility: Debility  Agitation: Agitation  Hypotension: Hypotension  Leukocytosis: Leukocytosis  Prophylactic measure: Prophylactic measure  Parkinson's disease: Parkinson&#x27;s disease  OSMIN (acute kidney injury): OSMIN (acute kidney injury)  Hematuria, unspecified type: Hematuria, unspecified type  Sinus tachycardia: Sinus tachycardia  Complicated UTI (urinary tract infection): Complicated UTI (urinary tract infection)

## 2019-05-10 NOTE — PROGRESS NOTE ADULT - PROBLEM SELECTOR PLAN 7
-no pharm vte ppx given hematuria  -cont scd

## 2019-05-10 NOTE — PROGRESS NOTE ADULT - PROBLEM SELECTOR PLAN 5
-sushant suspected due to either hemodynamically mediated phenomenon related to hypotension/infection vs. obstructive uropathy  -núñez in place  -s/p IVF on admission  -trend bmp, avoid nephrotoxins, renally dose meds  -urology appreciated
-c/w rytary, meds at bedside (rytary not on formulary)  -c/w sertraline, amantadine, and rasagiline  -fall precautions, aspiration precautions  -appreciate neuro input  -I spoke with Dr. Thrasher 5/3/19.
-sushant suspected due to either hemodynamically mediated phenomenon related to hypotension/infection vs. obstructive uropathy  -núñez in place  -s/p IVF this admission  -trend bmp, avoid nephrotoxins, renally dose meds  -urology appreciated
-c/w rytary, meds at bedside (rytary not on formulary)  -c/w sertraline, amantadine, and rasagiline  -fall precautions, aspiration precautions
-c/w rytary, meds at bedside (rytary not on formulary)  -c/w sertraline, amantadine, and rasagiline  -fall precautions, aspiration precautions  -appreciate neuro input
-c/w rytary, meds at bedside (rytary not on formulary)  -c/w sertraline, amantadine, and rasagiline  -fall precautions, aspiration precautions  -appreciate neuro input
-c/w rytary, meds at bedside (rytary not on formulary)  -c/w sertraline, amantadine, and rasagiline  -fall precautions, aspiration precautions  -appreciate neuro input  -I left message with Dr. Thrasher's  to call me back
-c/w rytary, meds at bedside (rytary not on formulary)  -c/w sertraline, amantadine, and rasagiline  -fall precautions, aspiration precautions  -appreciate neuro input  -I spoke with Dr. Thrasher 5/3/19.
-sushant suspected due to either hemodynamically mediated phenomenon related to hypotension/infection vs. obstructive uropathy  -núñez in place  -s/p IVF on admission  -trend bmp, avoid nephrotoxins, renally dose meds  -urology appreciated
-sushant suspected due to either hemodynamically mediated phenomenon related to hypotension/infection vs. obstructive uropathy  -núñez in place  -s/p IVF this admission  -trend bmp, avoid nephrotoxins, renally dose meds  -urology appreciated
On DA therapy

## 2019-05-10 NOTE — PROGRESS NOTE ADULT - PROBLEM SELECTOR PROBLEM 1
Leukocytosis
Syncope
Agitation
Complicated UTI (urinary tract infection)
Leukocytosis
Syncope
Complicated UTI (urinary tract infection)
Syncope

## 2019-05-10 NOTE — PROGRESS NOTE ADULT - PROBLEM SELECTOR PROBLEM 6
Parkinson's disease
Prophylactic measure
Parkinson's disease
Palliative care encounter
Parkinson's disease
Prophylactic measure

## 2019-05-10 NOTE — PROGRESS NOTE ADULT - PROBLEM SELECTOR PLAN 3
-pt with chronic indwelling núñez and his UA is chronically abnormal; however, he is unable to classify presence of symptoms, and UA appears worse than baseline with now nitrities and LE + compared to prior; given associated hypotension and tachycardia and uptrending leukocytosis he was started empiricially on ->  -ceftriaxone 1 gram qd last day is 5/5/19; prior cx reviewed prior UCx + for kleb oxytoca sens to ctx  -ID appreciated  -blood cxs(-); urine cx contaminated
-ongoing, previously attributed to issues with radiation cystitis   -núñez in place, apparently changed in ER, no drainage into bag but dark red urine in tube  -urology appreciated  -no indication for CBI at the moment  -RN doing prn manual flushes  -no vte pharm ppx, cont scd  -hgb stable per baseline 8-9
-pt with chronic indwelling núñez and his UA is chronically abnormal; however, he is unable to classify presence of symptoms, and UA appears worse than baseline with now nitrities and LE + compared to prior; given associated hypotension and tachycardia and uptrending leukocytosis he was started empiricially on ->  -ceftriaxone 1 gram daily last day was 5/5/19; prior cx reviewed prior UCx + for kleb oxytoca sens to ctx  -ID appreciated  -blood cxs(-); urine cx contaminated
bp is improved
-ongoing, previously attributed to issues with radiation cystitis   -núñez in place, apparently changed in ER, no drainage into bag but dark red urine in tube  -urology appreciated  -no indication for CBI at the moment  -RN doing prn manual flushes  -no vte pharm ppx, cont scd  -hgb stable per baseline 8-9
-ongoing, previously attributed to issues with radiation cystitis   -núñez in place, apparently changed in ER, no drainage into bag but dark red urine in tube  -urology appreciated  -no indication for CBI at the moment  -no vte pharm ppx, cont scd  -hgb stable per baseline 8-9
-pt with chronic indwelling núñez and his UA is chronically abnormal; however, he is unable to classify presence of symptoms, and UA appears worse than baseline with now nitrities and LE + compared to prior; given associated hypotension and tachycardia and uptrending leukocytosis he was started empiricially on ->  -ceftriaxone 1 gram daily last day was 5/5/19; prior cx reviewed prior UCx + for kleb oxytoca sens to ctx  -ID appreciated  -blood cxs(-); urine cx contaminated
-pt with chronic indwelling núñez and his UA is chronically abnormal; however, he is unable to classify presence of symptoms, and UA appears worse than baseline with now nitrities and LE + compared to prior; given associated hypotension and tachycardia and uptrending leukocytosis he was started empiricially on ->  -ceftriaxone 1 gram qd last day is 5/5/19; prior cx reviewed prior UCx + for kleb oxytoca sens to ctx  -ID appreciated  -blood cxs(-); urine cx contaminated
ACP time 20 min  Discussion about further treatment ensued  He and the LT partner are considering abx/IVF trial  They are seeking LTC and hospcie care in a LTC facility due to caregiving challenges  Plan is for [facility] then LTC +/- hospice depending upon the clinical course
Appears resolved
bp is improved
bp on low side

## 2019-05-10 NOTE — PROGRESS NOTE ADULT - REASON FOR ADMISSION
tachycardia, hypotension, OSMIN
tachycardia, hypotension, sushant

## 2019-05-10 NOTE — PROGRESS NOTE ADULT - PROBLEM SELECTOR PROBLEM 3
Complicated UTI (urinary tract infection)
Hematuria, unspecified type
Complicated UTI (urinary tract infection)
Hypotension
ACP (advance care planning)
Complicated UTI (urinary tract infection)
Hematuria, unspecified type
Hypotension

## 2019-05-10 NOTE — DISCHARGE NOTE PROVIDER - HOSPITAL COURSE
78 M PMH of HTN, HLD, prostate cancer s/p RT c/b hemorrhagic radiation cystitis, chronic indwelling núñez after admission last month with multiple issues of núñez clogging and retention, Parkinson's disease, recent admit for obstructive uropathy 2/2 núñez issues, now presents from rehab with hypotension and tachycardia, f/w hematuria and OSMIN.          Complicated UTI (urinary tract infection).  Plan: -pt with chronic indwelling núñez and his UA is chronically abnormal; however, he is unable to classify presence of symptoms, and UA appears worse than baseline with now nitrities and LE + compared to prior; given associated hypotension and tachycardia and uptrending leukocytosis he was started empiricially on ->    -ceftriaxone 1 gram qd for now; prior cx reviewed prior UCx + for kleb oxytoca sens to ctx    -ID appreciated    -blood cxs(-); urine cx contaminated    -Urology  appreciate     Sinus tachycardia.   -reports from rehab suggest tachycardia to 150 and irregularity on pulse; EMS records show HR 90s and irregular, but no EKG confirming afib    -agree with tele monitoring; tele reviewed sinus 60-80 currently    -suspect some degree of autonomic instability contributing to abn vitals; c/w empiric treatment of UTI as noted    -consider starting midodine if symptomatic with transfers.              Hematuria, unspecified type.   -ongoing, previously attributed to issues with radiation cystitis     -núñez in place, apparently changed in ER, no drainage into bag but dark red urine in tube    -urology appreciated    -no indication for CBI at the moment    -RN doing prn manual flushes    -no vte pharm ppx, cont scd    -hgb stable per baseline 8-9.                 OSMIN (acute kidney injury).   -osmin suspected due to either hemodynamically mediated phenomenon related to hypotension/infection vs. obstructive uropathy    -núñez in place    -s/p IVF on admission    -trend bmp, avoid nephrotoxins, renally dose meds    -urology appreciated     Parkinson's disease.   -c/w rytary, meds at bedside (rytary not on formulary)    -c/w sertraline, amantadine, and rasagiline    -fall precautions, aspiration precautions    -appreciate neuro input. 79 y/o M PMH of HTN, HLD, prostate cancer s/p RT c/b hemorrhagic radiation cystitis, chronic indwelling núñez after admission last month with multiple issues of núñez clogging and retention, Parkinson's disease, recent admit for obstructive uropathy 2/2 núñez issues, now presents from rehab with hypotension and tachycardia, f/w hematuria and OSMIN.          Complicated UTI (urinary tract infection).  Plan: -pt with chronic indwelling núñez and his UA is chronically abnormal; however, he is unable to classify presence of symptoms, and UA appears worse than baseline with now nitrities and LE + compared to prior; given associated hypotension and tachycardia and uptrending leukocytosis he was started empiricially on ->    -ceftriaxone 1 gram qd for now; prior cx reviewed prior UCx + for kleb oxytoca sens to ctx    -ID appreciated    -blood cxs(-); urine cx contaminated    -Urology  appreciate     Sinus tachycardia.   -reports from rehab suggest tachycardia to 150 and irregularity on pulse; EMS records show HR 90s and irregular, but no EKG confirming afib    -agree with tele monitoring; tele reviewed sinus 60-80 currently    -suspect some degree of autonomic instability contributing to abn vitals; c/w empiric treatment of UTI as noted    -consider starting midodine if symptomatic with transfers.              Hematuria, unspecified type.   -ongoing, previously attributed to issues with radiation cystitis     -núñez in place, apparently changed in ER, no drainage into bag but dark red urine in tube    -urology appreciated    -no indication for CBI at the moment    -RN doing prn manual flushes    -no vte pharm ppx, cont scd    -hgb stable per baseline 8-9.                 OSMIN (acute kidney injury).   -osmin suspected due to either hemodynamically mediated phenomenon related to hypotension/infection vs. obstructive uropathy    -núñez in place    -s/p IVF on admission    -trend bmp, avoid nephrotoxins, renally dose meds    -urology appreciated     Parkinson's disease.   -c/w rytary, meds at bedside (rytary not on formulary)    -c/w sertraline, amantadine, and rasagiline    -fall precautions, aspiration precautions    -appreciate neuro input. 79 y/o M PMH of HTN, HLD, prostate cancer s/p RT c/b hemorrhagic radiation cystitis, chronic indwelling núñez after admission last month with multiple issues of núñez clogging and retention, Parkinson's disease, recent admit for obstructive uropathy 2/2 núñez issues, now presents from rehab with hypotension and tachycardia, f/w hematuria and OSMIN.          Complicated UTI (urinary tract infection).  Plan: -pt with chronic indwelling núñez and his UA is chronically abnormal; however, he is unable to classify presence of symptoms, and UA appears worse than baseline with now nitrities and LE + compared to prior; given associated hypotension and tachycardia and uptrending leukocytosis he was started empiricially on ->    -ceftriaxone 1 gram qd for now; prior cx reviewed prior UCx + for kleb oxytoca sens to ctx    -ID appreciated    -blood cxs(-); urine cx contaminated    -Urology  appreciate     Sinus tachycardia.   -reports from rehab suggest tachycardia to 150 and irregularity on pulse; EMS records show HR 90s and irregular, but no EKG confirming afib    -agree with tele monitoring; tele reviewed sinus 60-80 currently    -suspect some degree of autonomic instability contributing to abn vitals; c/w empiric treatment of UTI as noted    -consider starting midodine if symptomatic with transfers.              Hematuria, unspecified type.   -ongoing, previously attributed to issues with radiation cystitis     -núñez in place, apparently changed in ER, no drainage into bag but dark red urine in tube    -urology appreciated    -no indication for CBI at the moment    -RN doing prn manual flushes    -no vte pharm ppx, cont scd    -hgb stable per baseline 8-9.                 OSMIN (acute kidney injury).   -osmin suspected due to either hemodynamically mediated phenomenon related to hypotension/infection vs. obstructive uropathy    -núñez in place    -s/p IVF on admission    -trend bmp, avoid nephrotoxins, renally dose meds    -urology appreciated     Parkinson's disease.      -c/w rytary, meds at bedside (rytary not on formulary)    -c/w sertraline, amantadine, and rasagiline    -fall precautions, aspiration precautions    -appreciate neuro input.

## 2019-05-10 NOTE — PROGRESS NOTE ADULT - PROBLEM SELECTOR PROBLEM 4
Sinus tachycardia
OSMIN (acute kidney injury)
Hematuria, unspecified type
Sinus tachycardia
Hematuria, unspecified type
OSMIN (acute kidney injury)
Sinus tachycardia

## 2019-05-10 NOTE — DISCHARGE NOTE PROVIDER - CARE PROVIDER_API CALL
Anabel Calle)  Internal Medicine  [id number]  [place] [address]  Phone: [phone]  Fax: [phone]  Follow Up Time:

## 2019-05-10 NOTE — PROGRESS NOTE ADULT - PROBLEM SELECTOR PLAN 1
-pt with chronic indwelling núñez and his UA is chronically abnormal; however, he is unable to classify presence of symptoms, and UA appears worse than baseline with now nitrities and LE + compared to prior; given associated hypotension and tachycardia and uptrending leukocytosis he was started empiricially on ->  -ceftriaxone 1 gram qd last day is 5/5/19; prior cx reviewed prior UCx + for kleb oxytoca sens to ctx  -ID appreciated  -blood cxs(-); urine cx contaminated
likely autonomic dysfunction  cont midodrine 5 mg tid; uptitrate as needed if he becomes dizzy with transfers  NS IVF bolus prn if he becomes severely dizzy with transfers
persists  but this is not new  blood cx neg  urine cx 3 or more organism-  pt on ceftriaxone  and he is stable on this so will continue for now
-pt with chronic indwelling nñúez and his UA is chronically abnormal; however, he is unable to classify presence of symptoms, and UA appears worse than baseline with now nitrities and LE + compared to prior; given associated hypotension and tachycardia and uptrending leukocytosis he was started empiricially on ->  -ceftriaxone 1 gram qd for now; prior cx reviewed prior UCx + for kleb oxytoca sens to ctx  -ID appreciated  -f/u bcx, ucx  -pt now hemodynamically stable post IVF; suspect component of autonomic dysfunction related to parkinsons but must r/o infectious etio  -Urology  appreciated
-pt with chronic indwelling núñez and his UA is chronically abnormal; however, he is unable to classify presence of symptoms, and UA appears worse than baseline with now nitrities and LE + compared to prior; given associated hypotension and tachycardia and uptrending leukocytosis he was started empiricially on ->  -ceftriaxone 1 gram qd for now; prior cx reviewed prior UCx + for kleb oxytoca sens to ctx  -ID appreciated  -blood cxs(-); urine cx contaminated  -Urology  appreciated
-pt with chronic indwelling núñez and his UA is chronically abnormal; however, he is unable to classify presence of symptoms, and UA appears worse than baseline with now nitrities and LE + compared to prior; given associated hypotension and tachycardia and uptrending leukocytosis he was started empiricially on ->  -ceftriaxone 1 gram qd for now; prior cx reviewed prior UCx + for kleb oxytoca sens to ctx  -ID appreciated  -blood cxs(-); urine cx contaminated  -pt now hemodynamically stable post IVF; suspect component of autonomic dysfunction related to parkinsons but must r/o infectious etio  -Urology  appreciated
Controlled
likely autonomic dysfunction  increase midodrine to 5 mg tid  NS IVF bolus today  orthostatic precautions with transfers
likely autonomic dysfunction/orthostatic hypotension  c/w midodrine 10 mg tid  NS IVF bolus prn if he becomes severely dizzy with transfers  s/p gentle IVF. will not give anymore.  add Florinef.   repeat orthostatics.
likely autonomic dysfunction/orthostatic hypotension  cont midodrine 5 mg tid; uptitrate as needed if he becomes dizzy with transfers  NS IVF bolus prn if he becomes severely dizzy with transfers
likely autonomic dysfunction/orthostatic hypotension  midodrine increased to 10 mg tid  NS IVF bolus prn if he becomes severely dizzy with transfers  on gentle IVF. will give another 24 hrs.
persists  but this is not new  blood cx neg  urine cx 3 or more organism-  completed short course antibx for cystitis
persists  but this is not new  blood cx neg  urine cx 3 or more organism-  pt on ceftriaxone which shows sensitivity to previous cx of klebsiella  and he is stable on this so will continue for now
persists  but this is not new  blood cx neg  urine cx 3 or more organism-  pt on ceftriaxone which shows sensitivity to previous cx of klebsiella  today is day 6 of ceftriaxone, will limit Rx to tomorrow's dose.
likely autonomic dysfunction  started low-dose midodrine 5/4/19  orthostatic precautions with transfers

## 2019-05-10 NOTE — PROGRESS NOTE ADULT - ATTENDING COMMENTS
Physical therapy. Out of bed to chair with assistance.   d/c planning to rehab.  Inpossible to completely eliminate orthostatic hypotension in a pt with severe PD.  (already have max IVF, max dose midodrine, on florinef 0.1 mg)  Asymptomatic now.   dc planning to rehab.     [facility]  - [phone] Physical therapy. Out of bed to chair with assistance.   d/c planning to rehab.  Inpossible to completely eliminate orthostatic hypotension in a pt with severe PD.  (already have max IVF, max dose midodrine, on florinef 0.1 mg)  Asymptomatic now.   dc planning to rehab.   (had extensive discussion with the wife. rarely Zyprexa can cause orthostatic hypotension, but pt has been on this for a long time and he doesn't toelrate without it due to agitation and hallucinations. c/w caution. )    - Dr. JESUS Frankel ([facility])  - [phone]

## 2019-05-10 NOTE — PROGRESS NOTE ADULT - PROBLEM SELECTOR PROBLEM 2
Hematuria, unspecified type
Sinus tachycardia
Hematuria, unspecified type
OSMIN (acute kidney injury)
Debility
Hematuria, unspecified type
OSMIN (acute kidney injury)
Sinus tachycardia

## 2019-05-10 NOTE — PROGRESS NOTE ADULT - PROBLEM SELECTOR PLAN 2
-ongoing, previously attributed to issues with radiation cystitis   -núñez in place, apparently changed in ER, no drainage into bag but dark red urine in tube  -urology appreciated  -no indication for CBI at the moment  -RN doing prn manual flushes  -no vte pharm ppx, cont scd  -hgb stable
-reports from rehab suggest tachycardia to 150 and irregularity on pulse; EMS records show HR 90s and irregular, but no EKG confirming afib  -cont tele  -suspect some degree of autonomic instability contributing to abn vitals; c/w empiric treatment of UTI as noted  -consider starting midodine if symptomatic with transfers
-ongoing, previously attributed to issues with radiation cystitis   -núñez in place, apparently changed in ER, no drainage into bag but dark red urine in tube  -urology appreciated  -no indication for CBI at the moment  -RN doing prn manual flushes  -no vte pharm ppx, cont scd  -hgb a bit labile but overall stable
creat better today
-ongoing, previously attributed to issues with radiation cystitis   -núñez in place, apparently changed in ER, no drainage into bag but dark red urine in tube  -urology appreciated  -no indication for CBI at the moment  -RN doing prn manual flushes  -no vte pharm ppx, cont scd  -hgb a bit labile but overall stable
-ongoing, previously attributed to issues with radiation cystitis   -núñez in place, apparently changed in ER, no drainage into bag but dark red urine in tube  -urology appreciated  -no indication for CBI at the moment  -RN doing prn manual flushes  -no vte pharm ppx, cont scd  -hgb stable
-reports from rehab suggest tachycardia to 150 and irregularity on pulse; EMS records show HR 90s and irregular, but no EKG confirming afib  -agree with tele monitoring; tele reviewed sinus 60-80 currently  -suspect some degree of autonomic instability contributing to abn vitals; c/w empiric treatment of UTI as noted  -consider starting midodine if symptomatic with transfers
-reports from rehab suggest tachycardia to 150 and irregularity on pulse; EMS records show HR 90s and irregular, but no EKG confirming afib  -cont tele  -suspect some degree of autonomic instability contributing to abn vitals; c/w empiric treatment of UTI as noted  -consider starting midodine if symptomatic with transfers
Creat slightly above baseline.
PPS 50
baseline now
creat better today

## 2019-05-11 ENCOUNTER — TRANSCRIPTION ENCOUNTER (OUTPATIENT)
Age: 79
End: 2019-05-11

## 2019-05-11 VITALS
DIASTOLIC BLOOD PRESSURE: 62 MMHG | OXYGEN SATURATION: 95 % | HEART RATE: 71 BPM | TEMPERATURE: 98 F | SYSTOLIC BLOOD PRESSURE: 100 MMHG | RESPIRATION RATE: 18 BRPM

## 2019-05-11 LAB
ANION GAP SERPL CALC-SCNC: 12 MMOL/L — SIGNIFICANT CHANGE UP (ref 5–17)
BUN SERPL-MCNC: 17 MG/DL — SIGNIFICANT CHANGE UP (ref 7–23)
CALCIUM SERPL-MCNC: 8.9 MG/DL — SIGNIFICANT CHANGE UP (ref 8.4–10.5)
CHLORIDE SERPL-SCNC: 107 MMOL/L — SIGNIFICANT CHANGE UP (ref 96–108)
CO2 SERPL-SCNC: 23 MMOL/L — SIGNIFICANT CHANGE UP (ref 22–31)
CREAT SERPL-MCNC: 1.24 MG/DL — SIGNIFICANT CHANGE UP (ref 0.5–1.3)
GLUCOSE SERPL-MCNC: 128 MG/DL — HIGH (ref 70–99)
HCT VFR BLD CALC: 26.3 % — LOW (ref 39–50)
HGB BLD-MCNC: 7.7 G/DL — LOW (ref 13–17)
MCHC RBC-ENTMCNC: 27.9 PG — SIGNIFICANT CHANGE UP (ref 27–34)
MCHC RBC-ENTMCNC: 29.3 GM/DL — LOW (ref 32–36)
MCV RBC AUTO: 95.3 FL — SIGNIFICANT CHANGE UP (ref 80–100)
PLATELET # BLD AUTO: 474 K/UL — HIGH (ref 150–400)
POTASSIUM SERPL-MCNC: 3.8 MMOL/L — SIGNIFICANT CHANGE UP (ref 3.5–5.3)
POTASSIUM SERPL-SCNC: 3.8 MMOL/L — SIGNIFICANT CHANGE UP (ref 3.5–5.3)
RBC # BLD: 2.76 M/UL — LOW (ref 4.2–5.8)
RBC # FLD: 15.3 % — HIGH (ref 10.3–14.5)
SODIUM SERPL-SCNC: 142 MMOL/L — SIGNIFICANT CHANGE UP (ref 135–145)
WBC # BLD: 12.08 K/UL — HIGH (ref 3.8–10.5)
WBC # FLD AUTO: 12.08 K/UL — HIGH (ref 3.8–10.5)

## 2019-05-11 PROCEDURE — 84484 ASSAY OF TROPONIN QUANT: CPT

## 2019-05-11 PROCEDURE — 36415 COLL VENOUS BLD VENIPUNCTURE: CPT

## 2019-05-11 PROCEDURE — 93005 ELECTROCARDIOGRAM TRACING: CPT

## 2019-05-11 PROCEDURE — 99285 EMERGENCY DEPT VISIT HI MDM: CPT | Mod: 25

## 2019-05-11 PROCEDURE — 83735 ASSAY OF MAGNESIUM: CPT

## 2019-05-11 PROCEDURE — 80048 BASIC METABOLIC PNL TOTAL CA: CPT

## 2019-05-11 PROCEDURE — 97530 THERAPEUTIC ACTIVITIES: CPT

## 2019-05-11 PROCEDURE — 86923 COMPATIBILITY TEST ELECTRIC: CPT

## 2019-05-11 PROCEDURE — 87086 URINE CULTURE/COLONY COUNT: CPT

## 2019-05-11 PROCEDURE — 97110 THERAPEUTIC EXERCISES: CPT

## 2019-05-11 PROCEDURE — P9016: CPT

## 2019-05-11 PROCEDURE — 83605 ASSAY OF LACTIC ACID: CPT

## 2019-05-11 PROCEDURE — 81001 URINALYSIS AUTO W/SCOPE: CPT

## 2019-05-11 PROCEDURE — 71045 X-RAY EXAM CHEST 1 VIEW: CPT

## 2019-05-11 PROCEDURE — 97162 PT EVAL MOD COMPLEX 30 MIN: CPT

## 2019-05-11 PROCEDURE — 82962 GLUCOSE BLOOD TEST: CPT

## 2019-05-11 PROCEDURE — 85610 PROTHROMBIN TIME: CPT

## 2019-05-11 PROCEDURE — 86900 BLOOD TYPING SEROLOGIC ABO: CPT

## 2019-05-11 PROCEDURE — 85027 COMPLETE CBC AUTOMATED: CPT

## 2019-05-11 PROCEDURE — 84100 ASSAY OF PHOSPHORUS: CPT

## 2019-05-11 PROCEDURE — 85730 THROMBOPLASTIN TIME PARTIAL: CPT

## 2019-05-11 PROCEDURE — 86850 RBC ANTIBODY SCREEN: CPT

## 2019-05-11 PROCEDURE — 87040 BLOOD CULTURE FOR BACTERIA: CPT

## 2019-05-11 PROCEDURE — 80053 COMPREHEN METABOLIC PANEL: CPT

## 2019-05-11 PROCEDURE — 86901 BLOOD TYPING SEROLOGIC RH(D): CPT

## 2019-05-11 PROCEDURE — 36430 TRANSFUSION BLD/BLD COMPNT: CPT

## 2019-05-11 RX ORDER — FLUDROCORTISONE ACETATE 0.1 MG/1
1 TABLET ORAL
Qty: 0 | Refills: 0 | DISCHARGE
Start: 2019-05-11

## 2019-05-11 RX ADMIN — LUBIPROSTONE 8 MICROGRAM(S): 24 CAPSULE, GELATIN COATED ORAL at 06:43

## 2019-05-11 RX ADMIN — Medication 1 APPLICATION(S): at 06:43

## 2019-05-11 RX ADMIN — CARBIDOPA AND LEVODOPA 2 CAPSULE(S): 25; 100 TABLET ORAL at 07:57

## 2019-05-11 RX ADMIN — LIDOCAINE 1 APPLICATION(S): 4 CREAM TOPICAL at 06:45

## 2019-05-11 RX ADMIN — FLUDROCORTISONE ACETATE 0.1 MILLIGRAM(S): 0.1 TABLET ORAL at 06:42

## 2019-05-11 RX ADMIN — Medication 100 MILLIGRAM(S): at 06:43

## 2019-05-11 RX ADMIN — MIDODRINE HYDROCHLORIDE 10 MILLIGRAM(S): 2.5 TABLET ORAL at 06:42

## 2019-05-11 RX ADMIN — SERTRALINE 100 MILLIGRAM(S): 25 TABLET, FILM COATED ORAL at 11:50

## 2019-05-11 RX ADMIN — CARBIDOPA AND LEVODOPA 2 CAPSULE(S): 25; 100 TABLET ORAL at 11:51

## 2019-05-11 NOTE — DISCHARGE NOTE NURSING/CASE MANAGEMENT/SOCIAL WORK - NSDCDPATPORTLINK_GEN_ALL_CORE
You can access the [facility] Patient Portal, offered by [facility], by registering with the following website: [email or web address]

## 2019-08-14 ENCOUNTER — APPOINTMENT (OUTPATIENT)
Dept: UROLOGY | Facility: CLINIC | Age: 79
End: 2019-08-14

## 2020-02-09 NOTE — PHYSICAL THERAPY INITIAL EVALUATION ADULT - PHYSICAL ASSIST/NONPHYSICAL ASSIST: STAND/SIT, REHAB EVAL
Bedside and Verbal shift change report given to Agnieszka Reza (oncoming nurse) by Nevin Lopez (offgoing nurse). Report included the following information SBAR and Kardex. 2 person assist/1 person assist/verbal cues

## 2020-03-03 NOTE — PROGRESS NOTE BEHAVIORAL HEALTH - RELATEDNESS
Health Maintenance Due   Topic Date Due   • Medicare Wellness 65+  05/15/2017   • Influenza Vaccine (1) 09/01/2019   • Depression Screening  09/24/2019   • Colorectal Cancer Screening-Colonoscopy  10/29/2019   • Pneumococcal Vaccine 65+ (2 of 2 - PPSV23) 02/05/2020       Patient is due for topics as listed above but is not proceeding with Immunization(s) Influenza and Pneumococcal and Colorectal Cancer Screening: Colonoscopy at this time.         
Good
Good

## 2020-10-14 NOTE — ED PROVIDER NOTE - CARE PLAN
Principal Discharge DX:	Diarrhea
Spine appears normal, range of motion is not limited, no muscle or joint tenderness

## 2020-12-17 NOTE — DISCHARGE NOTE PROVIDER - NSDCQMERRANDS_GEN_ALL_CORE
Ongoing SW/CM Assessment/Plan of Care Note     See SW/CM flowsheets for goals and other objective data.    Patient/Family discharge goal (s):      Home with family.       PT Recommendation:     Recommendation for Discharge: PT IL: Patient needs daily, skilled therapy for 1-3 hours a day by at least two disciplines    OT Recommendation:     Recommendations for Discharge: OT IL: Patient requires 24 hour nonskilled assistance to perform mobility and/or ADLs safely, Patient needs daily, skilled therapy for 1-3 hours a day by at least two disciplines      Progress note:   POC discussed in rounds. Still on 02 2L. Possible dc Monday after Stress test per Dr. Manuel. Spoke w daughter, Yadira and asked about home care referral. She wants to talk with over with patient's PCP as they have never needed in the past and daughter has provided all care. She will call CM back.         Yes

## 2021-03-17 NOTE — DIETITIAN INITIAL EVALUATION ADULT. - NS FNS CHANGE IN WEIGHT
Loss What Type Of Note Output Would You Prefer (Optional)?: Standard Output Hpi Title: Evaluation of Skin Lesions How Severe Are Your Spot(S)?: mild Have Your Spot(S) Been Treated In The Past?: has not been treated

## 2021-06-23 NOTE — DIETITIAN INITIAL EVALUATION ADULT. - PERTINENT MEDS FT
arterial line placement central line insertion femoral udall Colace, Miralax, Senna, Folic Acid, Pepcid

## 2022-09-27 NOTE — PATIENT PROFILE ADULT - NSPROPOAURINARYCATHETER_GEN_A_NUR
Medication:   Requested Prescriptions     Pending Prescriptions Disp Refills    levothyroxine (SYNTHROID) 25 MCG tablet 90 tablet 1        Last Filled:  4/22/22    Patient Phone Number: [phone] (home)     Last appt: 8/3/2022   Next appt: Visit date not found    Last OARRS: No flowsheet data found.
yes

## 2023-03-23 NOTE — PATIENT PROFILE ADULT - NSPROMEDSDISPOSITION_GEN_A_NUR
----- Message from Carlota Stephens LPN sent at 3/22/2023  5:07 PM CDT -----  Contact: called at [phone]    ----- Message -----  From: Apple Darby  Sent: 3/22/2023   1:44 PM CDT  To: Johny SALDIVAR Staff    Type: Needs Medical Advice  Who Called:  Pt's mother  Best Call Back Number: [phone]  Additional Information: Pt's mother is returning a call she missed yesterday. Please call back and advise.          sent home w/ family/friend

## 2023-04-10 NOTE — PATIENT PROFILE ADULT - RESOURCE/ENVIRONMENTAL CONCERNS
Patient Education     Exercise for a Healthier Heart     Exercise with a friend. When activity is fun, you're more likely to stick with it.   You may wonder how you can improve the health of your heart. If you’re thinking about exercise, you’re on the right track. You don’t need to become an athlete. But you do need a certain amount of brisk exercise to help strengthen your heart. If you have been diagnosed with a heart condition, your healthcare provider may advise exercise to help stabilize your condition. To help make exercise a habit, choose safe, fun activities.   Before you start  Check with your healthcare provider before starting an exercise program. This is especially important if you have not been active for a while. It's also important if you have a long-term (chronic) health problem such as heart disease, diabetes, or obesity. Or if you are at high risk for having these problems.   Why exercise?  Exercising regularly offers many healthy rewards. It can help you do all of the following:  Improve your blood cholesterol level to help prevent further heart trouble  Lower your blood pressure to help prevent a stroke or heart attack  Control diabetes, or reduce your risk of getting this disease  Improve your heart and lung function  Reach and stay at a healthy weight  Make your muscles stronger so you can stay active  Prevent falls and fractures by slowing the loss of bone mass (osteoporosis)  Manage stress better  Reduce your blood pressure  Improve your sense of self and your body image  Exercise tips    Ease into your routine. Set small goals. Then build on them. If you are not sure what your activity level should be, talk with your healthcare provider first before starting an exercise routine.  Exercise on most days. Aim for a total of 150 minutes (2 hours and 30 minutes) or more of moderate-intensity aerobic activity each week. Or 75 minutes (1 hour and 15 minutes) or more of vigorous-intensity  aerobic activity each week. Or try for a combination of both. Moderate activity means that you breathe heavier and your heart rate increases but you can still talk. Think about doing 40 minutes of moderate exercise, 3 to 4 times a week. For best results, activity should last for about 40 minutes to lower blood pressure and cholesterol. It's OK to work up to the 40-minute period over time. Examples of moderate-intensity activity are walking 1 mile in 15 minutes. Or doing 30 to 45 minutes of yard work.  Step up your daily activity level.  Along with your exercise program, try being more active the whole day. Walk instead of drive. Or park further away so that you take more steps each day. Do more household tasks or yard work. You may not be able to meet the advised mount of physical activity. But doing some moderate- or vigorous-intensity aerobic activity can help reduce your risk for heart disease. Your healthcare provider can help you figure out what is best for you.  Choose 1 or more activities you enjoy.  Walking is one of the easiest things you can do. You can also try swimming, riding a bike, dancing, or taking an exercise class.    When to call your healthcare provider  Call your healthcare provider if you have any of these:   Chest pain or feel dizzy or lightheaded  Burning, tightness, pressure, or heaviness in your chest, neck, shoulders, back, or arms  Abnormal shortness of breath  More joint or muscle pain  A very fast or irregular heartbeat (palpitations)  [employer] last reviewed this educational content on 7/1/2019  © 5385-5984 [employer]. [address] [place] [address], [place] [address]. All rights reserved. This information is not intended as a substitute for professional medical care. Always follow your healthcare professional's instructions.         Patient Education     4 Steps for Eating Healthier  Changing the way you eat can improve your health. It can lower your cholesterol and blood  pressure, and help you stay at a healthy weight. Your diet doesn’t have to be bland and boring to be healthy. Just watch your calories and follow these steps:    Step 1. Eat fewer unhealthy fats  Choose more fish and lean meats instead of fatty cuts of meat.  Skip butter and lard, and use less margarine.  Pass on foods that have palm, coconut, or hydrogenated oils.  Eat fewer high-fat dairy foods like cheese, ice cream, and whole milk.  Get a heart-healthy cookbook and try some low-fat recipes.  Step 2. Go light on salt  Keep the saltshaker off the table.  Limit high-salt ingredients, such as soy sauce, bouillon, and garlic salt.  Instead of adding salt when cooking, season your food with herbs and flavorings. Try lemon, garlic, and onion, or salt-free herb seasonings.  Limit convenience foods, such as boxed or canned foods and restaurant food.  Read food labels and choose lower-sodium options.  Step 3. Limit sugar  Pause before you add sugars to pancakes, cereal, coffee, or tea. This includes white and brown table sugar, syrup, honey, and molasses. Cut your usual amount by half.  Use non-sugar sweeteners. Stevia, aspartame, and sucralose can satisfy a sweet tooth without adding calories.  Swap out sugar-filled soda and other drinks. Buy sugar-free or low-calorie beverages. Remember water is always the best choice.  Read labels and choose foods with less added sugar. Keep in mind that dairy foods and foods with fruit will have some natural sugar.  Cut the sugar in recipes by 1/3 to 1/2. Boost the flavor with extracts like almond, vanilla, or orange. Or add spices such as cinnamon or nutmeg.  Step 4. Eat more fiber  Eat fresh fruits and vegetables every day.  Boost your diet with whole grains. Go for oats, whole-grain rice, and bran.  Add beans and lentils to your meals.  Drink more water to match your fiber increase to help prevent constipation.  Date Last Reviewed: 6/1/2017  © 3282-1693 [employer]  [address] [place] [address]. All rights reserved. This information is not intended as a substitute for professional medical care. Always follow your healthcare professional's instructions.         I will post your lab results in the next few days. Please note some labs take a few days to come back so I usually wait until everything is back before I write a result note. If you have any questions feel free to send me a message through the patient portal or call.      If any imaging was ordered today you can call central scheduling to schedule         none

## 2023-06-05 NOTE — PHYSICAL THERAPY INITIAL EVALUATION ADULT - ADL SKILLS, REHAB EVAL
CARDIOVASCULAR OFFICE VISIT NOTE    Patient: Dmitri Anaya Date of Visit: 6/6/2023   YOB: 1953    69 year old male      REASON FOR VISIT     Chief Complaint   Patient presents with   • Follow-up     4 month     Per clinic visit 11/2022  The patient states that he felt fine during the stress test and states that walking will hurt hs rotator cuff. The patient confirms that his mother had cardiac disease. The patient questions why he does not have any chest pain. The patient questions what could have happened if he did not have the test done. The patient questions if the stress test showed the percentage of blockage and how many arteries are involved. The patient confirms that he takes ASA 81. The pt denies taking his at home BP.     Per initial visit 10/2022  Referred by Saman Ingram MD    Dmitri Anaya is 69 year old male with history of  has a past medical history of Colon polyps, Diverticulosis, and Hypercholesterolemia.    He has no past medical history of Allergy or HIV disease (CMS/[facility]).  referred by PCP, Saman Ingram MD, for evaluation of an Elevated CT cardiac calcium scoring, CAD.    Dmitri Anaya presents to the office following his elevated ct cardiac calcium scoring. The patient states that he did not have any symptoms, but states that he had the test done out of curiosity. The patient had a cardiac calcium score of 2247. The patient states that his cholesterol is normally good. The patient states that most of his activity is yard work and states that he does not do much cardio. The patient states that he used to play baseball until his 40's. The patient denies experiencing any cardiac symptoms. The patient denies needing any BP or diabetic medications. The patient states that he thought he was healthy until these tests. The patient states that he used to have a stationary bike.  The patient denies CP, exertional CP, and palpitation, and diaphoresis. The patient denies lightheadedness,  dizziness, and syncope. The patient denies SOB, [place], orthopnea, NPD, and edema. The Patient denies bleeding from any site, hemoptysis, hematuria, hematochezia, and melena.     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         HISTORY OF PRESENT ILLNESS   Dmitri Anaya is a 69 year old male presenting for follow up regarding Elevated CT cardiac calcium scoring, CAD, Abnormal Stress.    Dmtiri Anaya presents to the office and is doing well. The pt states that he stays busy walking, working outside, riding the bike. The pt states that he has been doing outdoor activities without any concerning symptoms. The pt denies having any [place] of SOB with exertion. The pt complains of the metoprolol causing him some fatigue. The pt states that he will also get fatigued with lower sugar levels. The pt states that he can get an occasional dizziness with positional changes. The pt admits to taking a full dose 325 mg aspirin daily instead of 81 mg baby aspirin.  The patient denies CP, exertional CP, and palpitation, and diaphoresis at this time.  The patient denies SOB, [place], orthopnea, NPD, and edema.  The patient is taking and tolerating all cardiac medications.      Prior Cardiac Workup  Coronary artery bypass graft 2022  OPERATION PERFORMED:   Off-pump coronary artery bypass, pump standby, coronary bypass grafting x4 vessels with left internal mammary to the left anterior descending, saphenous vein graft sequential side-to-side to marginal 1, end-to-side to marginal 2, saphenous vein graft to posterior descending artery.  Endoscopic saphenous vein  harvesting.     Coronary angiogram 11/2022  • Prox RCA to Dist RCA lesion with 100% stenosis.  • Mid LAD lesion with 100% stenosis.  • Ost Cx to Prox Cx lesion with 70% stenosis.  • Ramus lesion with 70% stenosis.  • Dist LM to Ost LAD lesion with 40% stenosis.     Diagnostic  Dominance: Right  Left Main   Dist LM to Ost LAD lesion with 40% stenosis.   Left Anterior Descending   Collaterals   Mid LAD filled by collaterals from Mid LAD.      Mid LAD lesion with 100% stenosis.   Ramus Intermedius   Ramus lesion with 70% stenosis.   Left Circumflex   Ost Cx to Prox Cx lesion with 70% stenosis.   Right Coronary Artery   Prox RCA to Dist RCA lesion with 100% stenosis.   Right Posterior Descending Artery   Collaterals   RPDA filled by collaterals from 1st Sept.            Stress Test 11/2022  1. Negative EKG regadenoson stress test.   2. No chest discomfort with stress.   3. Appropriate hemodynamic response to stress.   4. Myocardial perfusion imaging interpretation reported separately   MPI  1. Abnormal study, evidence of moderate anteroapical and inferoseptal juan-infarct ischemia.  2. Evidence of apical and inferior injury.  3. Ejection fraction at rest 50%, declines to 40% with stress.  4. EKG interpretation reported separately    US carotid duplex bilateral 11/2022  IMPRESSION:  1. RIGHT CAROTID: There is less than 50% stenosis using NASCET criteria adapted to duplex ultrasound. Mild focal plaque formation as detailed above.  2. LEFT CAROTID: There is less than 50% stenosis using NASCET criteria adapted to duplex ultrasound. Mild focal plaque formation as detailed above.  3. Vertebral artery flow is antegrade bilaterally.  4. Bilateral external carotid artery stenosis.    CT Cardiac Calcium Scoring 10/2022  Moderate calcified atherosclerotic plaque burden, placing the patient at high cardiovascular disease risk with moderate nonobstructive coronary artery disease highly likely.  The patient's calcium score of 2247 is  at the 90th percentile rank when compared to other male patients between the ages of 66 and 70.  A calcium score greater than the 75th percentile rank adjusted for patient age means the relative risk for a future  cardiovascular event is high, regardless of the objective coronary artery calcium score.    Echo 10/2022  Mild increased left ventricular wall thickness.  Normal left ventricular systolic function.  Left ventricular ejection fraction; 58 %.  Grade I diastolic dysfunction of the left ventricle (impaired relaxation pattern).  Abnormal septal motion consistent with conduction abnormality.  Mild-moderate mitral valve regurgitation.  Moderately dilated sinus of valsalva. 4.6 cm.    Stress 7/2012  1.  Symptom-limited exercise standard protocol, which is negative for  electrocardiographic ischemia.  2.  Normal blood pressure response to exercise.  3.  Premature ventricular contractions and couplets.  4.  Recovery period was uneventful.  5.  The echo portion is pending.        HISTORIES     Past Surgical History:   Procedure Laterality Date   • Cardiac catherization     • Cardiac surgery     • Colonoscopy  11/27/2019   • Colonoscopy  02/22/2022    12 polyps removed   • Colonoscopy diagnostic  01/01/2008    Colonoscopy, Dx   • Coronary artery bypass graft  12/07/2022    OPCAB x 4 grafts    • Echocardiogram     • Eye surgery Bilateral     cataracts   • Left heart cath     • Nm olamide per rst/strs pharmacolo     • Service to gastroenterology            Summary of your Discharge Medications          Accurate as of June 6, 2023  8:53 AM. Always use your most recent med list.            Take these Medications      Details   aspirin 81 MG EC tablet  Commonly known as: ECOTRIN   Take 1 tablet by mouth daily.     atorvastatin 80 MG tablet  Commonly known as: Lipitor   Take 1 tablet by mouth daily. Indications: High Amount of Fats in the Blood     blood glucose test strip  Commonly known as: OneTouch Ultra   Test blood sugar 3  times daily. Diagnosis: E11.9. Meter: One Touch Meter     CENTRUM PO   Take 1 tablet by mouth daily.     fish oil-omega-3 fatty acids 1000 MG Cap   Take 1 capsule by mouth daily. Indications: Diabetes     insulin glargine 100 UNIT/ML pen-injector   Sig - Route: Inject 24 Units into the skin nightly. Prime 2 units before each dose. - Subcutaneous  Comment: Semglee     * Insulin Pen Needle 32G X 4 MM Misc  Commonly known as: BD Pen Needle Shila 2nd Gen   Use to inject insulin x4 times daily. Remove needle cover(s) to expose needle before injecting.     * Insulin Pen Needle 32G X 6 MM Misc   Use to inject insulin 4 times daily. Remove needle cover(s) to expose needle before injecting.  Comment: Insulin- unifine pen tips 32 g     metoPROLOL succinate 50 MG 24 hr tablet  Commonly known as: TOPROL-XL   Take 1 tablet by mouth daily. Indications: High Blood Pressure Disorder     ONE TOUCH ULTRA 2 w/Device Kit   Test blood sugar 4 times daily.  Comment: Diagnosis: E11.65     OneTouch Delica Lancets 30G Misc   1 each 3 times daily. Diagnosis: E11.9, Meter: One Touch Ultra         * This list has 2 medication(s) that are the same as other medications prescribed for you. Read the directions carefully, and ask your doctor or other care provider to review them with you.                    REVIEW OF SYSTEMS   Constitutional: No fever or chills, no change in weight.   Cardiovascular: See history of present illness. Physical activities NYHA class I.  Respiratory: No wheezing, cough, sputum or hemoptysis.  Skin: No rash or color change. No itching.  Eyes: No visual disturbance or diplopia  Ears/Nose/Throat: No nose bleeds, dysphagia or slurred speech.  Gastrointestinal: No abdominal pain, nausea or vomiting. No melena, hematochezia or hematemesis.  Genitourinary: No hematuria, no incontinence  Musculoskeletal: No myalgia, arthralgias or joint swelling.  Neurologic:  Negative for focal numbness or muscle weakness.  Psychiatric: Denies  depression, anxiety. No confusion/agitation.  Endocrine: No polyuria, polydipsia, heat or cold intolerance.    PHYSICAL EXAMINATION     Visit Vitals  /74   Pulse 60   Resp 16   Ht 6' 4\" (1.93 m)   Wt 111.1 kg (245 lb)   BMI 29.82 kg/m²     CONST:Comfortable, no acute distress.  HEENT: Normocephalic, conjunctivae not pale, no scleral icterus. Moist mucous membranes. Pupils symmetrical.  NECK: No jugular venous distension, trachea midline.   CHEST and RESPIRATORY: Symmetrical lung expansions. Normal respiratory effort. Lungs clear to auscultation bilaterally.    CARDIOVASCULAR:    PMI not displaced. No precordial thrill.    S1 and S2 normal intensity, regular rate and rhythm. No murmur/rubs. No S3, S4.   Carotid pulses brisk bilaterally, with no bruit.   Pulses symmetrical 2+ in radial, femoral and pedals. No edema in bilateral lower extremities.    ABDOMEN: Soft, nontender. Normoactive bowel sounds.  EXTREMITIES: Warm, well perfused extremities. No visible varicose veins, ulcer or gangrene.  NEUROLOGIC: Alert and oriented x3. Cranial nerves 2-12 intact grossly. Moves all 4 extremities equally.  SKIN: Not diaphoretic, warm. No visible bruising, no new rash.    TEST RESULTS   Recent labs and cardiovascular imaging reviewed in electronic medical record.    Lab Results   Component Value Date    SODIUM 138 03/27/2023    POTASSIUM 5.5 (H) 03/27/2023    BUN 35 (H) 03/27/2023    CREATININE 1.43 (H) 03/27/2023    WBC 6.1 03/27/2023    HCT 38.4 (L) 03/27/2023    HGB 12.4 (L) 03/27/2023    INR 1.1 12/08/2022    PTT 29 12/08/2022    GLUCOSE 111 (H) 03/27/2023    TSH 1.977 11/30/2022    CHOLESTEROL 124 03/27/2023    HDL 38 (L) 03/27/2023    CALCLDL 64 03/27/2023    TRIGLYCERIDE 112 03/27/2023    MG 2.4 12/10/2022         ASSESSMENT AND PLAN       Coronary Artery Disease s/p CABG   -S/p coronary artery bypass 12/[facility] at [facility] (LIMA->LAD, VG ->OM1->OM2, VG->PDA)  - Abnormal ECG, and an abnormal Stress 11/3/2022  - Echo  10/2022: Grade I diastolic dysfunction of the left ventricle, Abnormal septal motion consistent with conduction abnormality with Moderately dilated sinus of valsalva. 4.6 cm.  - CT Cardiac calcium score of 2247  - Additional risk factors include: mother having stents, CAD, Hx 30 years smoking 0.5-1 ppd, quit 10 years ago.  - Asymptomatic  - Currently on statin, asa, BB  - Lasix to be taken p.r.n   Pt reports fatigue, likely 2/2 BB  Recommendations:   - Reduce Toprol XL from 50 mg b.i.d. to 50 mg q.d.   - Monitor at home BP and HR  3 days after reducing medication, monitor BP twice daily for three days and call office on Monday with at home BP and HR   - Reduce 325 mg aspirin to 1/2 dose until finished, then reduce to 81 mg q.d.    Dilated Aorta   - Echo 10/2022; Moderately dilated sinus of valsalva measuring at 4.6 cm  - Cont monitoring    Hyperlipidemia  Currently on simvastatin  - Recommend pt continues to regulate blood sugars, and meet with a diabetic nutritionist    Overall Cardiovascular Health  -Recommended exercise 5x weekly for at least 30 minutes minimum       Thank you, Saman Ingram MD,  for allowing me to participate in Dmitri ALEXIS Anaya's care. Please call with any concerns or questions.     Return in about 6 months (around 12/6/2023). Or sooner if symptoms worsen. Instructions provided as documented in the after visit summary.    Thank you for allowing me to participate in Dmitri Anaya's care. Please call with any concerns or questions.    On 6/06/2023 Naresh MARK, scribed the services personally performed by Maxwell Morocho DO  I have personally performed the patient's history, physical exam, clinical impressions and treatment plan and the services documented by medical scribe.      DO Shanti [facility]   needs device and assist

## 2023-09-19 NOTE — DIETITIAN INITIAL EVALUATION ADULT. - NS FNS WEIGHT USED FOR CALC
Received call from pharmacist to change klonopin from disintegrating tab to whole tab to be crushed and given to pt ebenezer g-tube.
166lbs/ideal

## 2024-03-14 NOTE — CHART NOTE - NSCHARTNOTESELECT_GEN_ALL_CORE
Medicine/Event Note Patient here today for blood pressure check.    Patient has been experiencing these symptoms.    Current BP Medications and Dosages are:   Diltiazem  mg daily   Losartan 50 mg daily (increased 3/11/24 previously taking 25 daily)  Metoprolol Succinate 50 mg twice daily (previously taking 100 mg BID)    Patient took BP Medications today: Yes  Mediations changed 3/11/24     Manual BP  Today's Blood Pressure 178/60 and pulse 50  Recheck Blood Pressure 170/58 and pulse 52    Home BP Monitor  Did not bring     Patient verbalized understanding of the directions provided.  Patient agrees to call the clinic if any questions arise: Yes      Please review.

## 2024-09-25 NOTE — DIETITIAN INITIAL EVALUATION ADULT. - WEIGHT IN LBS
Occupational Therapy    Visit Type: treatment  SUBJECTIVE  Patient agreed to participate in therapy this date.  RN in agreement to work with patient for therapy session.  Received in bedside recliner. Endorsed feeling tired. Reported eat breakfast and performed oral hygiene without difficulty    Pain   Patient denies pain., Patient does not demonstrate pain behaviors.    OBJECTIVE     Cognitive Status   Level of Consciousness   - alert  Affect/Behavior    - flat and cooperative  Orientation    - Oriented to: person, place and time  Functional Communication   - Forms of Communication: verbal, expressive deficits and receptive deficits (see Speech therapy cognitive evaluation for furhter details)  Following Direction   - follows multistep commands with repetition  Transition Between Tasks   - transitions with cues  Awareness of Deficits   - assistance required to compensate for deficits    Vitals:  Blood Pressure (mmhg): 151/70  Pulse Ox (%): 99  Heart Rate (beats per minute): 74  Vitals appeared stable during session       Standing Balance  (LYNETTE = base of support)  Firm Surface: Double Leg      - Static, Eyes Open       - Trial 1 details: contact guard and with double UE support       Transfers  Assistive devices: gait belt, 2-wheeled walker  - Sit to stand: contact guard/touching/steadying assist, with verbal cues (from recliner)  - Stand to sit: contact guard/touching/steadying assist, with verbal cues  Cues for appropriate hand placement technique for walker safety for all transfer trials      Functional Ambulation  - Assistance: minimal assist, with verbal cues and contact guard/touching/steadying assist  - Assistive device: gait belt and 2-wheeled walker  - Distance (ft):6; 6; 15; 15  Physical assist to navigate with walker out of bathroom for safety due to bumping walker into doorframe on left hand side  Activities of Daily Living (ADLs)  Grooming/Oral Hygiene:   - Grooming assist: set up and supervision  (combed hair with wide toothed comb to all areas of head, placed roll-on deordorant; cues to squeeze with left hand to access product with lip moisturizer tube)  - Position: chair (declined to attempt standing at sink due to fatigue)  - Assist needed for: supervision/safety and verbal cueing (Reached and obtained products from bedside tray table with bilateral hands from midline and partial left of midline without cues, opening closing small twist caps for self)  Lower Body Dressing:   Lower body dressing assist: increased assist for clothing management.  - Footwear:       - Assistance: supervision (donned/doffed bilateral socks with figure 4 - noted increased time to complete with increased attempts for alignment on left foot compared to right foot)  Toileting:   - Toilet transfer:        - Assist: minimal assist, with verbal cues and with tactile cues (assist for eccentric control to sit, contact guard to standw with cues for left hand transitions from grab bar <> walker hand )       - Device: gait belt and 2-wheeled walker       - Equipment: grab bar use and bedside commode (1st trial with bedside commode contact guard assist)  - Equipment: catheter  Interventions    Treatment provided: ADL training, compensatory techniques, functional ambulation, safety training and transfer training Required cues for visual scanning with ambulation  Skilled input: verbal instruction/cues and as detailed above  Verbal Consent: Writer verbally educated and received verbal consent for hand placement, positioning of patient, and techniques to be performed today from patient for clothing adjustments for techniques, therapist position for techniques and hand placement and palpation for techniques as described above and how they are pertinent to the patient's plan of care.         Education:   - Present and ready to learn: patient  Education provided during session:  - Results of above outlined education: Needs  reinforcement    ASSESSMENT   Progress: progressing toward goals  Interfering components: cognitive deficits and medical status limitations    Discharge needs based on today's assessment:  - Current level of function: significantly below baseline level of function  - Therapy needs at discharge: intensive daily therapy  - Activities of daily living (ADLs) requiring support at discharge: bed mobility, transfers, ambulation, dressing, grooming, bathing and toileting  - Instrumental activities of daily living (IADLs) requiring support at discharge: community mobility, driving, emergency responses, financial management, health/medication management, home management, meal preparation, Jehov[employer]'s witness observances and shopping  - Impairments that require further therapy intervention: activity tolerance, balance, cognition, visual perception and coordination  AM-PAC  - Prior Level of Function: IND/MOD I ([facility] 22-24)       Key: MOD A=moderate assistance, IND/MOD I=independent/modified independent  - Generalized Current Level of Function     - Current Self-Cares: 12       Scoring Key= >21 Modified Independent; 20-21 Supervision; 18-19 Minimal assist; 13-17 Moderate assist; 9-12 Max assist; <9 Total assist      PLAN (while hospitalized)  Suggestions for next session as indicated: Upper body dressing, lower body dressing; Grooming at sink as able, toilet transfers    OT Frequency: 6-7 x per week         Agreement to plan and goals: patient agrees with goals and treatment plan      GOALS  Long Term Goals: (to be met by time of discharge from hospital)  Feeding: Patient will complete feeding tasks stand by assist.  Status: progressing/ongoing  Grooming: Patient will complete grooming tasks in standing and at sink contact guard or touching/steadying.  Status: revised, this goal modified (previous goal met, upgraded)  Upper body dressing: Patient will complete upper body dressing in sitting stand by assist.  Status:  progressing/ongoing  Lower body dressing: Patient will complete lower body dressing in sitting and in standing minimal assist.  Status: progressing/ongoing  Toileting: Patient will complete toileting minimal assist.  Status: progressing/ongoing  Bathing: Patient will complete bathing chairminimal assist   Status: progressing/ongoingToilet transfer: Patient will complete toilet transfer with gait belt and 2-wheeled walker, grab bar use, stand by assist.   Status: revised, this goal modified (goal upgraded due to able to demonstrate progression of functional mobility into bathroom this date)    Documented in the chart in the following areas: Assessment/Plan.    Patient at End of Session:   Location: in chair  Safety measures: alarm system in place/re-engaged, call light within reach and lines intact  Handoff to: nurse      Therapy procedure time and total treatment time can be found documented on the Time Entry flowsheet   [id number]

## 2024-10-02 NOTE — PATIENT PROFILE ADULT - NSPROIMPLANTSMEDDEV_GEN_A_NUR
Lidocaine patch not covered by plan, [employer] message sent to patient with option to pay out of pocket.  
Prior auth needed for    lidocaine 5 % External Patch, APPLY 1 PATCH TO AFFECTED AREA DAILY AS NEEDED FOR 5-7 DAYS. PURCHASE OVER THE COUNTER IF NOT COVERED BY INSURANCE., Disp: 30 patch, Rfl: 1  
None

## 2025-04-28 NOTE — DIETITIAN INITIAL EVALUATION ADULT. - PROBLEM SELECTOR PROBLEM 2
Patient Education   Preventive Care Advice   This is general advice given by our system to help you stay healthy. However, your care team may have specific advice just for you. Please talk to your care team about your preventive care needs.  Nutrition  Eat 5 or more servings of fruits and vegetables each day.  Try wheat bread, brown rice and whole grain pasta (instead of white bread, rice, and pasta).  Get enough calcium and vitamin D. Check the label on foods and aim for 100% of the RDA (recommended daily allowance).  Lifestyle  Exercise at least 150 minutes each week  (30 minutes a day, 5 days a week).  Do muscle strengthening activities 2 days a week. These help control your weight and prevent disease.  No smoking.  Wear sunscreen to prevent skin cancer.  Have a dental exam and cleaning every 6 months.  Yearly exams  See your health care team every year to talk about:  Any changes in your health.  Any medicines your care team has prescribed.  Preventive care, family planning, and ways to prevent chronic diseases.  Shots (vaccines)   HPV shots (up to age 26), if you've never had them before.  Hepatitis B shots (up to age 59), if you've never had them before.  COVID-19 shot: Get this shot when it's due.  Flu shot: Get a flu shot every year.  Tetanus shot: Get a tetanus shot every 10 years.  Pneumococcal, hepatitis A, and RSV shots: Ask your care team if you need these based on your risk.  Shingles shot (for age 50 and up)  General health tests  Diabetes screening:  Starting at age 35, Get screened for diabetes at least every 3 years.  If you are younger than age 35, ask your care team if you should be screened for diabetes.  Cholesterol test: At age 39, start having a cholesterol test every 5 years, or more often if advised.  Bone density scan (DEXA): At age 50, ask your care team if you should have this scan for osteoporosis (brittle bones).  Hepatitis C: Get tested at least once in your life.  STIs (sexually  transmitted infections)  Before age 24: Ask your care team if you should be screened for STIs.  After age 24: Get screened for STIs if you're at risk. You are at risk for STIs (including HIV) if:  You are sexually active with more than one person.  You don't use condoms every time.  You or a partner was diagnosed with a sexually transmitted infection.  If you are at risk for HIV, ask about PrEP medicine to prevent HIV.  Get tested for HIV at least once in your life, whether you are at risk for HIV or not.  Cancer screening tests  Cervical cancer screening: If you have a cervix, begin getting regular cervical cancer screening tests starting at age 21.  Breast cancer scan (mammogram): If you've ever had breasts, begin having regular mammograms starting at age 40. This is a scan to check for breast cancer.  Colon cancer screening: It is important to start screening for colon cancer at age 45.  Have a colonoscopy test every 10 years (or more often if you're at risk) Or, ask your provider about stool tests like a FIT test every year or Cologuard test every 3 years.  To learn more about your testing options, visit:   .  For help making a decision, visit:   https://bit.ly/xp50735.  Prostate cancer screening test: If you have a prostate, ask your care team if a prostate cancer screening test (PSA) at age 55 is right for you.  Lung cancer screening: If you are a current or former smoker ages 50 to 80, ask your care team if ongoing lung cancer screenings are right for you.  For informational purposes only. Not to replace the advice of your health care provider. Copyright   2023 [facility] [employer] [facility]. All rights reserved. Clinically reviewed by the [facility]. [employer] 456422 - REV 01/24.  Learning About Activities of Daily Living  What are activities of daily living?     Activities of daily living (ADLs) are the basic self-care tasks you do every day. These include eating, bathing, dressing,  and moving around.  As you age, and if you have health problems, you may find that it's harder to do some of these tasks. If so, your doctor can suggest ideas that may help.  To measure what kind of help you may need, your doctor will ask how well you are able to do ADLs. Let your doctor know if there are any tasks that you are having trouble doing. This is an important first step to getting help. And when you have the help you need, you can stay as independent as possible.  How will a doctor assess your ADLs?  Asking about ADLs is part of a routine health checkup your doctor will likely do as you age. Your health check might be done in a doctor's office, in your home, or at a hospital. The goal is to find out if you are having any problems that could make it hard to care for yourself or that make it unsafe for you to be on your own.  To measure your ADLs, your doctor will ask how hard it is for you to do routine tasks. Your doctor may also want to know if you have changed the way you do a task because of a health problem. Your doctor may watch how you:  Walk back and forth.  Keep your balance while you stand or walk.  Move from sitting to standing or from a bed to a chair.  Button or unbutton a shirt or sweater.  Remove and put on your shoes.  It's common to feel a little worried or anxious if you find you can't do all the things you used to be able to do. Talking with your doctor about ADLs is a way to make sure you're as safe as possible and able to care for yourself as well as you can. You may want to bring a caregiver, friend, or family member to your checkup. They can help you talk to your doctor.  Follow-up care is a key part of your treatment and safety. Be sure to make and go to all appointments, and call your doctor if you are having problems. It's also a good idea to know your test results and keep a list of the medicines you take.  Current as of: October 24, 2024  Content Version: 14.4    8528-9571  [employer].   Care instructions adapted under license by your healthcare professional. If you have questions about a medical condition or this instruction, always ask your healthcare professional. [employer] disclaims any warranty or liability for your use of this information.    Hearing Loss: Care Instructions  Overview     Hearing loss is a sudden or slow decrease in how well you hear. It can range from slight to profound. Permanent hearing loss can occur with aging. It also can happen when you are exposed long-term to loud noise. Examples include listening to loud music, riding motorcycles, or being around other loud machines.  Hearing loss can affect your work and home life. It can make you feel lonely or depressed. You may feel that you have lost your independence. But hearing aids and other devices can help you hear better and feel connected to others.  Follow-up care is a key part of your treatment and safety. Be sure to make and go to all appointments, and call your doctor if you are having problems. It's also a good idea to know your test results and keep a list of the medicines you take.  How can you care for yourself at home?  Avoid loud noises whenever possible. This helps keep your hearing from getting worse.  Always wear hearing protection around loud noises.  Wear a hearing aid as directed.  A professional can help you pick a hearing aid that will work best for you.  You can also get hearing aids over the counter for mild to moderate hearing loss.  Have hearing tests as your doctor suggests. They can show whether your hearing has changed. Your hearing aid may need to be adjusted.  Use other devices as needed. These may include:  Telephone amplifiers and hearing aids that can connect to a television, stereo, radio, or microphone.  Devices that use lights or vibrations. These alert you to the doorbell, a ringing telephone, or a baby monitor.  Television closed-captioning. This  "shows the words at the bottom of the screen. Most new TVs can do this.  TTY (text telephone). This lets you type messages back and forth on the telephone instead of talking or listening. These devices are also called TDD. When messages are typed on the keyboard, they are sent over the phone line to a receiving TTY. The message is shown on a monitor.  Use text messaging, social media, and email if it is hard for you to communicate by telephone.  Try to learn a listening technique called speechreading. It is not lipreading. You pay attention to people's gestures, expressions, posture, and tone of voice. These clues can help you understand what a person is saying. Face the person you are talking to, and have them face you. Make sure the lighting is good. You need to see the other person's face clearly.  Think about counseling if you need help to adjust to your hearing loss.  When should you call for help?  Watch closely for changes in your health, and be sure to contact your doctor if:    You think your hearing is getting worse.     You have new symptoms, such as dizziness or nausea.   Where can you learn more?  Go to https://www.[employer].net/patiented  Enter R798 in the search box to learn more about \"Hearing Loss: Care Instructions.\"  Current as of: October 27, 2024  Content Version: 14.4    7452-5068 [employer].   Care instructions adapted under license by your healthcare professional. If you have questions about a medical condition or this instruction, always ask your healthcare professional. [employer] disclaims any warranty or liability for your use of this information.       " Sinus tachycardia

## 2025-05-08 NOTE — PROGRESS NOTE ADULT - PROBLEM SELECTOR PROBLEM 1
Orthostatic hypotension
Orthostatic hypotension
Urinary retention
Detail Level: Simple
Instructions: This plan will send the code FBSD to the PM system.  DO NOT or CHANGE the price.
Price (Do Not Change): 0.00
